# Patient Record
Sex: FEMALE | Race: BLACK OR AFRICAN AMERICAN | URBAN - METROPOLITAN AREA
[De-identification: names, ages, dates, MRNs, and addresses within clinical notes are randomized per-mention and may not be internally consistent; named-entity substitution may affect disease eponyms.]

---

## 2017-01-04 ENCOUNTER — TELEPHONE (OUTPATIENT)
Dept: FAMILY MEDICINE CLINIC | Age: 37
End: 2017-01-04

## 2017-01-05 NOTE — TELEPHONE ENCOUNTER
Pt returning my call. She says that she has been taking the abx and feeling better, about 70% better. She says that the Pulmonologist did call her. She says that she was not taking the Mucinex. I told her that if she was not feeling better to call me and we can extend her abx and to continue either Mucinex or Sudafed daily even after abx. I elt her know that her blood work was unremarkable. The patient expressed understanding and had no further questions.

## 2017-02-18 ENCOUNTER — OFFICE VISIT (OUTPATIENT)
Dept: FAMILY MEDICINE CLINIC | Age: 37
End: 2017-02-18

## 2017-02-18 VITALS
RESPIRATION RATE: 16 BRPM | TEMPERATURE: 97.5 F | OXYGEN SATURATION: 100 % | SYSTOLIC BLOOD PRESSURE: 114 MMHG | DIASTOLIC BLOOD PRESSURE: 74 MMHG | WEIGHT: 165.6 LBS | HEIGHT: 64 IN | BODY MASS INDEX: 28.27 KG/M2 | HEART RATE: 76 BPM

## 2017-02-18 DIAGNOSIS — M54.2 NECK PAIN: ICD-10-CM

## 2017-02-18 DIAGNOSIS — M54.50 ACUTE LEFT-SIDED LOW BACK PAIN WITHOUT SCIATICA: Primary | ICD-10-CM

## 2017-02-18 DIAGNOSIS — Z87.39: ICD-10-CM

## 2017-02-18 RX ORDER — IBUPROFEN 200 MG
400 TABLET ORAL
Qty: 90 TAB | Refills: 2 | Status: SHIPPED | OUTPATIENT
Start: 2017-02-18 | End: 2017-02-18 | Stop reason: DRUGHIGH

## 2017-02-18 RX ORDER — CYCLOBENZAPRINE HCL 10 MG
10 TABLET ORAL
Qty: 30 TAB | Refills: 0 | Status: SHIPPED | OUTPATIENT
Start: 2017-02-18 | End: 2017-11-24

## 2017-02-18 RX ORDER — IBUPROFEN 800 MG/1
800 TABLET ORAL
Qty: 90 TAB | Refills: 3 | Status: SHIPPED | OUTPATIENT
Start: 2017-02-18 | End: 2018-04-14
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
## 2017-02-18 NOTE — PATIENT INSTRUCTIONS
Back Pain: Care Instructions  Your Care Instructions    Back pain has many possible causes. It is often related to problems with muscles and ligaments of the back. It may also be related to problems with the nerves, discs, or bones of the back. Moving, lifting, standing, sitting, or sleeping in an awkward way can strain the back. Sometimes you don't notice the injury until later. Arthritis is another common cause of back pain. Although it may hurt a lot, back pain usually improves on its own within several weeks. Most people recover in 12 weeks or less. Using good home treatment and being careful not to stress your back can help you feel better sooner. Follow-up care is a key part of your treatment and safety. Be sure to make and go to all appointments, and call your doctor if you are having problems. Its also a good idea to know your test results and keep a list of the medicines you take. How can you care for yourself at home? · Sit or lie in positions that are most comfortable and reduce your pain. Try one of these positions when you lie down:  ¨ Lie on your back with your knees bent and supported by large pillows. ¨ Lie on the floor with your legs on the seat of a sofa or chair. Courtney Ferrell on your side with your knees and hips bent and a pillow between your legs. ¨ Lie on your stomach if it does not make pain worse. · Do not sit up in bed, and avoid soft couches and twisted positions. Bed rest can help relieve pain at first, but it delays healing. Avoid bed rest after the first day of back pain. · Change positions every 30 minutes. If you must sit for long periods of time, take breaks from sitting. Get up and walk around, or lie in a comfortable position. · Try using a heating pad on a low or medium setting for 15 to 20 minutes every 2 or 3 hours. Try a warm shower in place of one session with the heating pad. · You can also try an ice pack for 10 to 15 minutes every 2 to 3 hours.  Put a thin cloth between the ice pack and your skin. · Take pain medicines exactly as directed. ¨ If the doctor gave you a prescription medicine for pain, take it as prescribed. ¨ If you are not taking a prescription pain medicine, ask your doctor if you can take an over-the-counter medicine. · Take short walks several times a day. You can start with 5 to 10 minutes, 3 or 4 times a day, and work up to longer walks. Walk on level surfaces and avoid hills and stairs until your back is better. · Return to work and other activities as soon as you can. Continued rest without activity is usually not good for your back. · To prevent future back pain, do exercises to stretch and strengthen your back and stomach. Learn how to use good posture, safe lifting techniques, and proper body mechanics. When should you call for help? Call your doctor now or seek immediate medical care if:  · You have new or worsening numbness in your legs. · You have new or worsening weakness in your legs. (This could make it hard to stand up.)  · You lose control of your bladder or bowels. Watch closely for changes in your health, and be sure to contact your doctor if:  · Your pain gets worse. · You are not getting better after 2 weeks. Where can you learn more? Go to http://salome-teresa.info/. Enter T157 in the search box to learn more about \"Back Pain: Care Instructions. \"  Current as of: May 23, 2016  Content Version: 11.1  © 0723-8881 [employer]. Care instructions adapted under license by [employer] (which disclaims liability or warranty for this information). If you have questions about a medical condition or this instruction, always ask your healthcare professional. [address] any warranty or liability for your use of this information. Back Stretches: Exercises  Your Care Instructions  Here are some examples of exercises for stretching your back. Start each exercise slowly. Ease off the exercise if you start to have pain. Your doctor or physical therapist will tell you when you can start these exercises and which ones will work best for you. How to do the exercises  Overhead stretch    1. Stand comfortably with your feet shoulder-width apart. 2. Looking straight ahead, raise both arms over your head and reach toward the ceiling. Do not allow your head to tilt back. 3. Hold for 15 to 30 seconds, then lower your arms to your sides. 4. Repeat 2 to 4 times. Side stretch    1. Stand comfortably with your feet shoulder-width apart. 2. Raise one arm over your head, and then lean to the other side. 3. Slide your hand down your leg as you let the weight of your arm gently stretch your side muscles. Hold for 15 to 30 seconds. 4. Repeat 2 to 4 times on each side. Press-up    1. Lie on your stomach, supporting your body with your forearms. 2. Press your elbows down into the floor to raise your upper back. As you do this, relax your stomach muscles and allow your back to arch without using your back muscles. As your press up, do not let your hips or pelvis come off the floor. 3. Hold for 15 to 30 seconds, then relax. 4. Repeat 2 to 4 times. Relax and rest    1. Lie on your back with a rolled towel under your neck and a pillow under your knees. Extend your arms comfortably to your sides. 2. Relax and breathe normally. 3. Remain in this position for about 10 minutes. 4. If you can, do this 2 or 3 times each day. Follow-up care is a key part of your treatment and safety. Be sure to make and go to all appointments, and call your doctor if you are having problems. It's also a good idea to know your test results and keep a list of the medicines you take. Where can you learn more? Go to http://salome-teresa.info/. Enter K782 in the search box to learn more about \"Back Stretches: Exercises. \"  Current as of: May 23, 2016  Content Version: 11.1  © 3754-1524 Healthwise, Incorporated. Care instructions adapted under license by [employer] (which disclaims liability or warranty for this information). If you have questions about a medical condition or this instruction, always ask your healthcare professional. [address] any warranty or liability for your use of this information. Neck: Exercises  Your Care Instructions  Here are some examples of typical rehabilitation exercises for your condition. Start each exercise slowly. Ease off the exercise if you start to have pain. Your doctor or physical therapist will tell you when you can start these exercises and which ones will work best for you. How to do the exercises  Note: Stretching should make you feel a gentle stretch, but no pain. Stop any strengthening exercise that makes pain worse. Neck stretch    5. This stretch works best if you keep your shoulder down as you lean away from it. To help you remember to do this, start by relaxing your shoulders and lightly holding on to your thighs or your chair. 6. Tilt your head toward your shoulder and hold for 15 to 30 seconds. Let the weight of your head stretch your muscles. 7. If you would like a little added stretch, use your hand to gently and steadily pull your head toward your shoulder. For example, keeping your right shoulder down, lean your head to the left. 8. Repeat 2 to 4 times toward each shoulder. Diagonal neck stretch    5. Turn your head slightly toward the direction you will be stretching, and tilt your head diagonally toward your chest and hold for 15 to 30 seconds. 6. If you would like a little added stretch, use your hand to gently and steadily pull your head forward on the diagonal.  7. Repeat 2 to 4 times toward each side. Dorsal glide stretch    5. Sit or stand tall and look straight ahead. 6. Slowly tuck your chin as you glide your head backward over your body  7.  Hold for a count of 6, and then relax for up to 10 seconds. 8. Repeat 8 to 12 times. Note: The dorsal glide stretches the back of the neck. If you feel pain, do not glide so far back. Some people find this exercise easier to do while lying on their backs with an ice pack on the neck. Chest and shoulder stretch    5. Sit or stand tall and glide your head backward as in the dorsal glide stretch. 6. Raise both arms so that your hands are next to your ears. 7. Take a deep breath, and as you breathe out, lower your elbows down and behind your back. You will feel your shoulder blades slide down and together, and at the same time you will feel a stretch across your chest and the front of your shoulders. 8. Hold for about 6 seconds, and then relax for up to 10 seconds. 9. Repeat 8 to 12 times. Strengthening: Hands on head    1. Move your head backward, forward, and side to side against gentle pressure from your hands, holding each position for about 6 seconds. 2. Repeat 8 to 12 times. Follow-up care is a key part of your treatment and safety. Be sure to make and go to all appointments, and call your doctor if you are having problems. It's also a good idea to know your test results and keep a list of the medicines you take. Where can you learn more? Go to http://salome-teresa.info/. Enter P975 in the search box to learn more about \"Neck: Exercises. \"  Current as of: May 23, 2016  Content Version: 11.1  © 0646-6538 [employer]. Care instructions adapted under license by [employer] (which disclaims liability or warranty for this information). If you have questions about a medical condition or this instruction, always ask your healthcare professional. [place] [address] any warranty or liability for your use of this information.

## 2017-02-18 NOTE — PROGRESS NOTES
Yo Garza is a 43 y.o. female presents to office for follow up back pain. Breast size concern. 1. Have you been to the ER, urgent care clinic or hospitalized since your last visit? no  2.  Have you seen any other providers outside of [facility] since your last visit? no    Health Maintenance items with a due date reviewed with patient:  Health Maintenance Due   Topic Date Due    Pneumococcal 19-64 Highest Risk (1 of 3 - PCV13) 11/06/1997

## 2017-02-18 NOTE — MR AVS SNAPSHOT
Visit Information Date & Time Provider Department Dept. Phone Encounter #  
 2/18/2017 11:15 AM Shilpa Shaffer, [address] [facility] [phone] [id number] Follow-up Instructions Return if symptoms worsen or fail to improve. Upcoming Health Maintenance Date Due Pneumococcal 19-64 Highest Risk (1 of 3 - PCV13) 11/6/1997 PAP AKA CERVICAL CYTOLOGY 1/22/2021 DTaP/Tdap/Td series (2 - Td) 8/23/2026 Allergies as of 2/18/2017  Review Complete On: 2/18/2017 By: MATIAS Leyva Severity Noted Reaction Type Reactions Codeine  02/05/2013    Hives Iodinated Contrast Media - Oral And Iv Dye  01/22/2016    Itching, Swelling Pecan Nut  03/15/2016    Other (comments) Throat and ears itch Prednisone  03/15/2016    Other (comments) Shellfish Derived  01/22/2016    Swelling Current Immunizations  Reviewed on 1/22/2016 Name Date Influenza Vaccine 10/1/2015, 2/5/2013 Influenza Vaccine (Quad) PF 8/23/2016 12:24 PM  
 Tdap 8/23/2016 12:25 PM  
  
 Not reviewed this visit You Were Diagnosed With   
  
 Codes Comments Acute left-sided low back pain without sciatica    -  Primary ICD-10-CM: M54.5 ICD-9-CM: 724.2 Neck pain     ICD-10-CM: M54.2 ICD-9-CM: 723.1 History of neck disorder     ICD-10-CM: Z87.39 
ICD-9-CM: V13.59 Vitals BP Pulse Temp Resp Height(growth percentile) Weight(growth percentile) 114/74 (BP 1 Location: Right arm, BP Patient Position: Sitting) 76 97.5 °F (36.4 °C) (Oral) 16 5' 4\" (1.626 m) 165 lb 9.6 oz (75.1 kg) LMP SpO2 BMI OB Status Smoking Status 02/06/2017 (Exact Date) 100% 28.43 kg/m2 Having regular periods Never Smoker BMI and BSA Data Body Mass Index Body Surface Area  
 28.43 kg/m 2 1.84 m 2 Preferred Pharmacy Pharmacy Name Phone [facility] [address] [place] [address] [phone] Your Updated Medication List  
  
   
This list is accurate as of: 2/18/17 12:05 PM.  Always use your most recent med list.  
  
  
  
  
 albuterol 90 mcg/actuation inhaler Commonly known as:  PROVENTIL HFA, VENTOLIN HFA, PROAIR HFA Take 2 Puffs by inhalation every four (4) hours as needed for Wheezing. albuterol-ipratropium 2.5 mg-0.5 mg/3 ml Nebu Commonly known as:  DUO-NEB  
3 mL by Nebulization route every four (4) hours as needed. benzonatate 200 mg capsule Commonly known as:  TESSALON Take one capsule 3 times daily if needed for cough Cholecalciferol (Vitamin D3) 50,000 unit Cap Take  by mouth. cyclobenzaprine 10 mg tablet Commonly known as:  FLEXERIL Take 1 Tab by mouth three (3) times daily as needed for Muscle Spasm(s). ibuprofen 200 mg tablet Commonly known as:  MOTRIN Take 2 Tabs by mouth every six (6) hours as needed for Pain. lactobacillus (B.ani-L.aci-L.sal-L.plan-L. Esteban) 10 billion cell (2 billion ea) Cap capsule Take 1 Cap by mouth daily. loratadine 10 mg tablet Commonly known as:  Garlan Baas Take 1 Tab by mouth two (2) times a day. metroNIDAZOLE 0.75 % vaginal gel Commonly known as:  METROGEL VAGINAL Insert 1 Applicator into vagina nightly. Insert applicator into vagina nightly x 5 days, then twice weekly x 6 months  
  
 pyridoxine (vitamin B6) 100 mg tablet Commonly known as:  VITAMIN B-6 Take 200 mg by mouth daily. topiramate 100 mg tablet Commonly known as:  TOPAMAX Take 1 Tab by mouth two (2) times a day. Prescriptions Sent to Pharmacy Refills  
 cyclobenzaprine (FLEXERIL) 10 mg tablet 0 Sig: Take 1 Tab by mouth three (3) times daily as needed for Muscle Spasm(s). Class: Normal  
 Pharmacy: [address] [place] [address] KWABENA Pickens Ph #: [phone]  Route: Oral  
 ibuprofen (MOTRIN) 200 mg tablet 2  
 Sig: Take 2 Tabs by mouth every six (6) hours as needed for Pain. Class: Normal  
 Pharmacy: [id number] [address] KWABENA Young Ph #: [phone] Route: Oral  
  
Follow-up Instructions Return if symptoms worsen or fail to improve. To-Do List   
 02/18/2017 Imaging:  XR SPINE CERV PA LAT ODONT 3 V MAX   
  
 02/18/2017 Imaging:  XR SPINE LUMB 2 OR 3 V Patient Instructions Back Pain: Care Instructions Your Care Instructions Back pain has many possible causes. It is often related to problems with muscles and ligaments of the back. It may also be related to problems with the nerves, discs, or bones of the back. Moving, lifting, standing, sitting, or sleeping in an awkward way can strain the back. Sometimes you don't notice the injury until later. Arthritis is another common cause of back pain. Although it may hurt a lot, back pain usually improves on its own within several weeks. Most people recover in 12 weeks or less. Using good home treatment and being careful not to stress your back can help you feel better sooner. Follow-up care is a key part of your treatment and safety. Be sure to make and go to all appointments, and call your doctor if you are having problems. Its also a good idea to know your test results and keep a list of the medicines you take. How can you care for yourself at home? · Sit or lie in positions that are most comfortable and reduce your pain. Try one of these positions when you lie down: ¨ Lie on your back with your knees bent and supported by large pillows. ¨ Lie on the floor with your legs on the seat of a sofa or chair. Courtney Ferrell on your side with your knees and hips bent and a pillow between your legs. ¨ Lie on your stomach if it does not make pain worse. · Do not sit up in bed, and avoid soft couches and twisted positions.  Bed rest can help relieve pain at first, but it delays healing. Avoid bed rest after the first day of back pain. · Change positions every 30 minutes. If you must sit for long periods of time, take breaks from sitting. Get up and walk around, or lie in a comfortable position. · Try using a heating pad on a low or medium setting for 15 to 20 minutes every 2 or 3 hours. Try a warm shower in place of one session with the heating pad. · You can also try an ice pack for 10 to 15 minutes every 2 to 3 hours. Put a thin cloth between the ice pack and your skin. · Take pain medicines exactly as directed. ¨ If the doctor gave you a prescription medicine for pain, take it as prescribed. ¨ If you are not taking a prescription pain medicine, ask your doctor if you can take an over-the-counter medicine. · Take short walks several times a day. You can start with 5 to 10 minutes, 3 or 4 times a day, and work up to longer walks. Walk on level surfaces and avoid hills and stairs until your back is better. · Return to work and other activities as soon as you can. Continued rest without activity is usually not good for your back. · To prevent future back pain, do exercises to stretch and strengthen your back and stomach. Learn how to use good posture, safe lifting techniques, and proper body mechanics. When should you call for help? Call your doctor now or seek immediate medical care if: 
· You have new or worsening numbness in your legs. · You have new or worsening weakness in your legs. (This could make it hard to stand up.) · You lose control of your bladder or bowels. Watch closely for changes in your health, and be sure to contact your doctor if: 
· Your pain gets worse. · You are not getting better after 2 weeks. Where can you learn more? Go to http://salome-teresa.info/. Enter B145 in the search box to learn more about \"Back Pain: Care Instructions. \" Current as of: May 23, 2016 Content Version: 11.1 © 2357-7757 BodeTree. Care instructions adapted under license by ANPI (which disclaims liability or warranty for this information). If you have questions about a medical condition or this instruction, always ask your healthcare professional. [address] any warranty or liability for your use of this information. Back Stretches: Exercises Your Care Instructions Here are some examples of exercises for stretching your back. Start each exercise slowly. Ease off the exercise if you start to have pain. Your doctor or physical therapist will tell you when you can start these exercises and which ones will work best for you. How to do the exercises Overhead stretch 1. Stand comfortably with your feet shoulder-width apart. 2. Looking straight ahead, raise both arms over your head and reach toward the ceiling. Do not allow your head to tilt back. 3. Hold for 15 to 30 seconds, then lower your arms to your sides. 4. Repeat 2 to 4 times. Side stretch 1. Stand comfortably with your feet shoulder-width apart. 2. Raise one arm over your head, and then lean to the other side. 3. Slide your hand down your leg as you let the weight of your arm gently stretch your side muscles. Hold for 15 to 30 seconds. 4. Repeat 2 to 4 times on each side. Press-up 1. Lie on your stomach, supporting your body with your forearms. 2. Press your elbows down into the floor to raise your upper back. As you do this, relax your stomach muscles and allow your back to arch without using your back muscles. As your press up, do not let your hips or pelvis come off the floor. 3. Hold for 15 to 30 seconds, then relax. 4. Repeat 2 to 4 times. Relax and rest 
 
1. Lie on your back with a rolled towel under your neck and a pillow under your knees. Extend your arms comfortably to your sides. 2. Relax and breathe normally. 3. Remain in this position for about 10 minutes. 4. If you can, do this 2 or 3 times each day. Follow-up care is a key part of your treatment and safety. Be sure to make and go to all appointments, and call your doctor if you are having problems. It's also a good idea to know your test results and keep a list of the medicines you take. Where can you learn more? Go to http://salome-teresa.info/. Enter I835 in the search box to learn more about \"Back Stretches: Exercises. \" Current as of: May 23, 2016 Content Version: 11.1 © 0363-4441 DreamHeart. Care instructions adapted under license by enymotion (which disclaims liability or warranty for this information). If you have questions about a medical condition or this instruction, always ask your healthcare professional. [address] any warranty or liability for your use of this information. Neck: Exercises Your Care Instructions Here are some examples of typical rehabilitation exercises for your condition. Start each exercise slowly. Ease off the exercise if you start to have pain. Your doctor or physical therapist will tell you when you can start these exercises and which ones will work best for you. How to do the exercises Note: Stretching should make you feel a gentle stretch, but no pain. Stop any strengthening exercise that makes pain worse. Neck stretch 5. This stretch works best if you keep your shoulder down as you lean away from it. To help you remember to do this, start by relaxing your shoulders and lightly holding on to your thighs or your chair. 6. Tilt your head toward your shoulder and hold for 15 to 30 seconds. Let the weight of your head stretch your muscles. 7. If you would like a little added stretch, use your hand to gently and steadily pull your head toward your shoulder. For example, keeping your right shoulder down, lean your head to the left. 8. Repeat 2 to 4 times toward each shoulder. Diagonal neck stretch 5. Turn your head slightly toward the direction you will be stretching, and tilt your head diagonally toward your chest and hold for 15 to 30 seconds. 6. If you would like a little added stretch, use your hand to gently and steadily pull your head forward on the diagonal. 
7. Repeat 2 to 4 times toward each side. Dorsal glide stretch 5. Sit or stand tall and look straight ahead. 6. Slowly tuck your chin as you glide your head backward over your body 7. Hold for a count of 6, and then relax for up to 10 seconds. 8. Repeat 8 to 12 times. Note: The dorsal glide stretches the back of the neck. If you feel pain, do not glide so far back. Some people find this exercise easier to do while lying on their backs with an ice pack on the neck. Chest and shoulder stretch 5. Sit or stand tall and glide your head backward as in the dorsal glide stretch. 6. Raise both arms so that your hands are next to your ears. 7. Take a deep breath, and as you breathe out, lower your elbows down and behind your back. You will feel your shoulder blades slide down and together, and at the same time you will feel a stretch across your chest and the front of your shoulders. 8. Hold for about 6 seconds, and then relax for up to 10 seconds. 9. Repeat 8 to 12 times. Strengthening: Hands on head 1. Move your head backward, forward, and side to side against gentle pressure from your hands, holding each position for about 6 seconds. 2. Repeat 8 to 12 times. Follow-up care is a key part of your treatment and safety. Be sure to make and go to all appointments, and call your doctor if you are having problems. It's also a good idea to know your test results and keep a list of the medicines you take. Where can you learn more? Go to http://salome-teresa.info/. Enter P975 in the search box to learn more about \"Neck: Exercises. \" 
 Current as of: May 23, 2016 Content Version: 11.1 © 8173-7554 Diablo Technologies, Atlas Powered. Care instructions adapted under license by DVS Intelestream (which disclaims liability or warranty for this information). If you have questions about a medical condition or this instruction, always ask your healthcare professional. [address] any warranty or liability for your use of this information. Introducing [facility] & HEALTH SERVICES! Dear Kiarra Sheehan: 
Thank you for requesting a [facility] account. Our records indicate that you already have an active [facility] account. You can access your account anytime at [email or web address] Did you know that you can access your hospital and ER discharge instructions at any time in [facility]? You can also review all of your test results from your hospital stay or ER visit. Additional Information If you have questions, please visit the Frequently Asked Questions section of the [facility] website at [email or web address] Remember, [facility] is NOT to be used for urgent needs. For medical emergencies, dial 911. Now available from your iPhone and Android! Please provide this summary of care documentation to your next provider. Your primary care clinician is listed as Clau Dean. If you have any questions after today's visit, please call [phone].

## 2017-03-10 DIAGNOSIS — M54.5 ACUTE LOW BACK PAIN, UNSPECIFIED BACK PAIN LATERALITY, WITH SCIATICA PRESENCE UNSPECIFIED: ICD-10-CM

## 2017-03-10 DIAGNOSIS — M54.2 CERVICALGIA: Primary | ICD-10-CM

## 2017-03-15 ENCOUNTER — TELEPHONE (OUTPATIENT)
Dept: FAMILY MEDICINE CLINIC | Age: 37
End: 2017-03-15

## 2017-03-15 NOTE — TELEPHONE ENCOUNTER
Patient called the office back and was informed of results. Patient was advised to follow up sooner  if the pain does not improve. Patient showed verbal understanding and had  No further questions or concern at this time.

## 2017-03-20 ENCOUNTER — OFFICE VISIT (OUTPATIENT)
Dept: FAMILY MEDICINE CLINIC | Age: 37
End: 2017-03-20

## 2017-03-20 VITALS
TEMPERATURE: 99.2 F | WEIGHT: 171.8 LBS | SYSTOLIC BLOOD PRESSURE: 115 MMHG | RESPIRATION RATE: 16 BRPM | HEART RATE: 106 BPM | OXYGEN SATURATION: 99 % | BODY MASS INDEX: 29.33 KG/M2 | HEIGHT: 64 IN | DIASTOLIC BLOOD PRESSURE: 77 MMHG

## 2017-03-20 DIAGNOSIS — J02.9 SORE THROAT: Primary | ICD-10-CM

## 2017-03-20 DIAGNOSIS — J02.0 STREP PHARYNGITIS: ICD-10-CM

## 2017-03-20 LAB
S PYO AG THROAT QL: POSITIVE
VALID INTERNAL CONTROL?: YES

## 2017-03-20 RX ORDER — FLUCONAZOLE 150 MG/1
150 TABLET ORAL DAILY
Qty: 1 TAB | Refills: 0 | Status: SHIPPED | OUTPATIENT
Start: 2017-03-20 | End: 2017-03-21

## 2017-03-20 RX ORDER — AMOXICILLIN 500 MG/1
500 CAPSULE ORAL 2 TIMES DAILY
Qty: 20 CAP | Refills: 0 | Status: SHIPPED | OUTPATIENT
Start: 2017-03-20 | End: 2017-03-30

## 2017-03-20 NOTE — MR AVS SNAPSHOT
Visit Information Date & Time Provider Department Dept. Phone Encounter #  
 3/20/2017  3:00 PM Rafael Pearl NP [employer] [phone] [id number] Follow-up Instructions Return if symptoms worsen or fail to improve. Upcoming Health Maintenance Date Due Pneumococcal 19-64 Highest Risk (1 of 3 - PCV13) 11/6/1997 PAP AKA CERVICAL CYTOLOGY 1/22/2021 DTaP/Tdap/Td series (2 - Td) 8/23/2026 Allergies as of 3/20/2017  Review Complete On: 3/20/2017 By: Rafael Pearl NP Severity Noted Reaction Type Reactions Codeine  02/05/2013    Hives Iodinated Contrast Media - Oral And Iv Dye  01/22/2016    Itching, Swelling Pecan Nut  03/15/2016    Other (comments) Throat and ears itch Prednisone  03/15/2016    Other (comments) Shellfish Derived  01/22/2016    Swelling Current Immunizations  Reviewed on 1/22/2016 Name Date Influenza Vaccine 10/1/2015, 2/5/2013 Influenza Vaccine (Quad) PF 8/23/2016 12:24 PM  
 Tdap 8/23/2016 12:25 PM  
  
 Not reviewed this visit You Were Diagnosed With   
  
 Codes Comments Sore throat    -  Primary ICD-10-CM: J02.9 ICD-9-CM: 833 Strep pharyngitis     ICD-10-CM: J02.0 ICD-9-CM: 034.0 Vitals BP Pulse Temp Resp Height(growth percentile) Weight(growth percentile) 115/77 (BP 1 Location: Right arm, BP Patient Position: Sitting) (!) 106 99.2 °F (37.3 °C) (Oral) 16 5' 4\" (1.626 m) 171 lb 12.8 oz (77.9 kg) LMP SpO2 BMI OB Status Smoking Status 03/04/2017 99% 29.49 kg/m2 Having regular periods Never Smoker Vitals History BMI and BSA Data Body Mass Index Body Surface Area  
 29.49 kg/m 2 1.88 m 2 Preferred Pharmacy Pharmacy Name Phone OSW[facility] ESPINOSA JR. [facility] [address] [place] [address] [phone] Your Updated Medication List  
  
   
 This list is accurate as of: 3/20/17  3:42 PM.  Always use your most recent med list.  
  
  
  
  
 albuterol 90 mcg/actuation inhaler Commonly known as:  PROVENTIL HFA, VENTOLIN HFA, PROAIR HFA Take 2 Puffs by inhalation every four (4) hours as needed for Wheezing. albuterol-ipratropium 2.5 mg-0.5 mg/3 ml Nebu Commonly known as:  DUO-NEB  
3 mL by Nebulization route every four (4) hours as needed. amoxicillin 500 mg capsule Commonly known as:  AMOXIL Take 1 Cap by mouth two (2) times a day for 10 days. Cholecalciferol (Vitamin D3) 50,000 unit Cap Take  by mouth. cyclobenzaprine 10 mg tablet Commonly known as:  FLEXERIL Take 1 Tab by mouth three (3) times daily as needed for Muscle Spasm(s). fluconazole 150 mg tablet Commonly known as:  DIFLUCAN Take 1 Tab by mouth daily for 1 day. FDA advises cautious prescribing of oral fluconazole in pregnancy. ibuprofen 800 mg tablet Commonly known as:  MOTRIN Take 1 Tab by mouth every eight (8) hours as needed for Pain. lactobacillus (B.ani-L.aci-L.sal-L.plan-L. Esteban) 10 billion cell (2 billion ea) Cap capsule Take 1 Cap by mouth daily. metroNIDAZOLE 0.75 % vaginal gel Commonly known as:  METROGEL VAGINAL Insert 1 Applicator into vagina nightly. Insert applicator into vagina nightly x 5 days, then twice weekly x 6 months  
  
 topiramate 100 mg tablet Commonly known as:  TOPAMAX Take 1 Tab by mouth two (2) times a day. Prescriptions Sent to Pharmacy Refills  
 amoxicillin (AMOXIL) 500 mg capsule 0 Sig: Take 1 Cap by mouth two (2) times a day for 10 days. Class: Normal  
 Pharmacy: [address] [place] [address] KWABENA Rendon Ph #: [phone] Route: Oral  
 fluconazole (DIFLUCAN) 150 mg tablet 0 Sig: Take 1 Tab by mouth daily for 1 day. FDA advises cautious prescribing of oral fluconazole in pregnancy.   
 Class: Normal  
 Pharmacy: [facility] [address] [place] [address] KWABENA Vogel Ph #: [phone] Route: Oral  
  
We Performed the Following AMB POC RAPID STREP A [99454 CPT(R)] Follow-up Instructions Return if symptoms worsen or fail to improve. Patient Instructions Strep Throat: Care Instructions Your Care Instructions Strep throat is a bacterial infection that causes sudden, severe sore throat and fever. Strep throat, which is caused by bacteria called streptococcus, is treated with antibiotics. Sometimes a strep test is necessary to tell if the sore throat is caused by strep bacteria. Treatment can help ease symptoms and may prevent future problems. Follow-up care is a key part of your treatment and safety. Be sure to make and go to all appointments, and call your doctor if you are having problems. It's also a good idea to know your test results and keep a list of the medicines you take. How can you care for yourself at home? · Take your antibiotics as directed. Do not stop taking them just because you feel better. You need to take the full course of antibiotics. · Strep throat can spread to others until 24 hours after you begin taking antibiotics. During this time, you should avoid contact with other people at work or home, especially infants and children. Do not sneeze or cough on others, and wash your hands often. Keep your drinking glass and eating utensils separate from those of others, and wash these items well in hot, soapy water. · Gargle with warm salt water at least once each hour to help reduce swelling and make your throat feel better. Use 1 teaspoon of salt mixed in 8 fluid ounces of warm water. · Take an over-the-counter pain medication, such as acetaminophen (Tylenol), ibuprofen (Advil, Motrin), or naproxen (Aleve). Read and follow all instructions on the label.  
· Try an over-the-counter anesthetic throat spray or throat lozenges, which may help relieve throat pain. · Drink plenty of fluids. Fluids may help soothe an irritated throat. Hot fluids, such as tea or soup, may help your throat feel better. · Eat soft solids and drink plenty of clear liquids. Flavored ice pops, ice cream, scrambled eggs, sherbet, and gelatin dessert (such as Jell-O) may also soothe the throat. · Get lots of rest. 
· Do not smoke, and avoid secondhand smoke. If you need help quitting, talk to your doctor about stop-smoking programs and medicines. These can increase your chances of quitting for good. · Use a vaporizer or humidifier to add moisture to the air in your bedroom. Follow the directions for cleaning the machine. When should you call for help? Call your doctor now or seek immediate medical care if: 
· You have a new or higher fever. · You have a fever with a stiff neck or severe headache. · You have new or worse trouble swallowing. · Your sore throat gets much worse on one side. · Your pain becomes much worse on one side of your throat. Watch closely for changes in your health, and be sure to contact your doctor if: 
· You are not getting better after 2 days (48 hours). · You do not get better as expected. Where can you learn more? Go to http://salome-teresa.info/. Enter K625 in the search box to learn more about \"Strep Throat: Care Instructions. \" Current as of: July 29, 2016 Content Version: 11.1 © 0592-8063 Levo League, Incorporated. Care instructions adapted under license by Zi Uniform Supply (which disclaims liability or warranty for this information). If you have questions about a medical condition or this instruction, always ask your healthcare professional. [address] any warranty or liability for your use of this information. Introducing [facility] & HEALTH SERVICES! Dear Arthur Morrison: 
Thank you for requesting a [employer] account.   Our records indicate that you already have an active [facility] account. You can access your account anytime at [email or web address] Did you know that you can access your hospital and ER discharge instructions at any time in [employer]? You can also review all of your test results from your hospital stay or ER visit. Additional Information If you have questions, please visit the Frequently Asked Questions section of the [employer] website at [email or web address] Remember, [employer] is NOT to be used for urgent needs. For medical emergencies, dial 911. Now available from your iPhone and Android! Please provide this summary of care documentation to your next provider. Your primary care clinician is listed as Kelin Rodriguez. If you have any questions after today's visit, please call [phone].

## 2017-03-20 NOTE — LETTER
NOTIFICATION RETURN TO WORK 
 
3/20/2017 3:42 PM 
 
Ms. Whitney Deleon [address] [facility] [address] [place] [phone] To Whom It May Concern: 
 
Whitney Deleon is currently under the care of [address] [place] [address]. She will return to work on: Wednesday March 22,2017 If there are questions or concerns please have the patient contact our office.  
 
 
 
Sincerely, 
 
 
Rica Jimenez NP

## 2017-03-20 NOTE — PROGRESS NOTES
Marianela Gale is a 43 y.o. female presents in office with sore throat that began Saturday night. Daughter has strep as well. 1. Have you been to the ER, urgent care clinic since your last visit? Hospitalized since your last visit? No    2. Have you seen or consulted any other health care providers outside of the [facility] since your last visit? Include any pap smears or colon screening.  No

## 2017-03-20 NOTE — PROGRESS NOTES
Eric Enriquez is a 43 y.o.  female and presents with    Chief Complaint   Patient presents with    Sore Throat     started Saturday night       Subjective:  Sore Throat  Patient complains of sore throat. Associated symptoms include sore throat. Onset of symptoms was 2 days ago, gradually worsening since that time. She is drinking moderate amounts of fluids. . She has had recent close exposure to someone with proven streptococcal pharyngitis. Her 5year old daughter was diagnosed with strep and is currently on amoxicillin (4th day). She has been taking ibuprofen for headache but no other medications. She states she has had strep many times as an adult. Additional Concerns: No       Patient Active Problem List   Diagnosis Code    [facility] G40.909    Palpitations R00.2    GARCIA (dyspnea on exertion) R06.09    CKD (chronic kidney disease) stage 2, GFR 60-89 ml/min N18.2    Recurrent UTI N39.0    Vitamin D deficiency E55.9    Bacterial vaginosis N76.0, B96.89    Trichomonal vaginitis A59.01    Routine health maintenance Z00.00    Chest pain R07.9    Urinary frequency R35.0    Loose skin L98.7    Allergic reaction T78.40XA    Abnormal uterine bleeding (AUB) N93.9     Current Outpatient Prescriptions   Medication Sig Dispense Refill    cyclobenzaprine (FLEXERIL) 10 mg tablet Take 1 Tab by mouth three (3) times daily as needed for Muscle Spasm(s). 30 Tab 0    ibuprofen (MOTRIN) 800 mg tablet Take 1 Tab by mouth every eight (8) hours as needed for Pain. 90 Tab 3    albuterol-ipratropium (DUO-NEB) 2.5 mg-0.5 mg/3 ml nebu 3 mL by Nebulization route every four (4) hours as needed. 30 Nebule 5    lactobacillus, B.ani-L.aci-L.sal-L.plan-L. Esteban, 10 billion cell (2 billion ea) cap capsule Take 1 Cap by mouth daily. 30 Cap 3    albuterol (PROVENTIL HFA, VENTOLIN HFA, PROAIR HFA) 90 mcg/actuation inhaler Take 2 Puffs by inhalation every four (4) hours as needed for Wheezing.  1 Inhaler 2    topiramate (TOPAMAX) 100 mg tablet Take 1 Tab by mouth two (2) times a day. (Patient taking differently: Take 150 mg by mouth two (2) times a day.) 60 Tab 2    Cholecalciferol, Vitamin D3, 50,000 unit cap Take  by mouth.  metroNIDAZOLE (METROGEL VAGINAL) 0.75 % vaginal gel Insert 1 Applicator into vagina nightly. Insert applicator into vagina nightly x 5 days, then twice weekly x 6 months 10 Tube 3     Allergies   Allergen Reactions    Codeine Hives    Iodinated Contrast Media - Oral And Iv Dye Itching and Swelling    Pecan Nut Other (comments)     Throat and ears itch    Prednisone Other (comments)    Shellfish Derived Swelling     Past Medical History:   Diagnosis Date    BV (bacterial vaginosis)     Chronic BV    Depression     Epilepsy ([address] 2001    Dr. Mary Damian H/O exercise stress test 11/26/2013    exercise nuc stress test wnl with EF 83%    Hematuria, microscopic 2015    Pelvic floor dysfunction     Recurrent UTI     Seizures (HCC)     Urinary frequency     UTI (urinary tract infection)      Past Surgical History:   Procedure Laterality Date    HX CHOLECYSTECTOMY  2005    HX TUBAL LIGATION  2008    LAP,CHOLECYSTECTOMY       Family History   Problem Relation Age of Onset    Thyroid Disease Mother     Alcohol abuse Father     Thyroid Disease Maternal Aunt     Thyroid Disease Maternal Grandmother     Cataract Paternal Grandmother      Social History   Substance Use Topics    Smoking status: Never Smoker    Smokeless tobacco: Never Used    Alcohol use No       ROS   History obtained from the patient  General ROS: positive for  - chills  ENT ROS: positive for - headaches and sore throat  Respiratory ROS: no cough, shortness of breath, or wheezing  Cardiovascular ROS: no chest pain or dyspnea on exertion    All other systems reviewed and are negative.       Objective:  Vitals:    03/20/17 1520   BP: 115/77   Pulse: (!) 106   Resp: 16   Temp: 99.2 °F (37.3 °C) TempSrc: Oral   SpO2: 99%   Weight: 171 lb 12.8 oz (77.9 kg)   Height: 5' 4\" (1.626 m)   PainSc:   8   PainLoc: Throat   LMP: 03/04/2017       General appearance - alert, well appearing, and in no distress  Ears - bilateral TM's and external ear canals normal  Mouth - erythematous and tonsils hypertrophied without exudate  Neck - supple, no significant adenopathy  Chest - clear to auscultation, no wheezes, rales or rhonchi, symmetric air entry  Heart - tachycardic and regular rhythm      LABS    3/20/2017  3:28 PM - Merilee Puls, LPN   Component Results   Component Value Flag Ref Range Units Status   VALID INTERNAL CONTROL POC Yes    Final   Group A Strep Ag Positive  Negative  Final        Assessment/Plan:    1. Strep Pharyngitis- positive rapid strep; Amoxicillin prescribed; symptomatic care; change out toothbrush after 24 hours of being on antibiotic; handwashing; avoid using same utensils and cups as others in the house hold; rest and increase water intake; chloraseptic spray for sore throat;    Lab review: no lab studies available for review at time of visit    Today's Visit: Amoxicillin, Fluconazole    Health Maintenance:     I have discussed the diagnosis with the patient and the intended plan as seen in the above orders. The patient has received an after-visit summary and questions were answered concerning future plans. I have discussed medication side effects and warnings with the patient as well. I have reviewed the plan of care with the patient, accepted their input and they are in agreement with the treatment goals. Follow-up Disposition:  Return if symptoms worsen or fail to improve. More than 1/2 of this 15 minute visit was spent in counseling and coordination of care, as described above.     YOLIS Frank

## 2017-03-20 NOTE — PATIENT INSTRUCTIONS

## 2017-07-28 ENCOUNTER — OFFICE VISIT (OUTPATIENT)
Dept: FAMILY MEDICINE CLINIC | Age: 37
End: 2017-07-28

## 2017-07-28 VITALS
HEIGHT: 64 IN | HEART RATE: 88 BPM | SYSTOLIC BLOOD PRESSURE: 108 MMHG | OXYGEN SATURATION: 98 % | DIASTOLIC BLOOD PRESSURE: 74 MMHG | WEIGHT: 170 LBS | RESPIRATION RATE: 18 BRPM | BODY MASS INDEX: 29.02 KG/M2 | TEMPERATURE: 98.6 F

## 2017-07-28 DIAGNOSIS — B96.89 BACTERIAL VAGINOSIS: Primary | ICD-10-CM

## 2017-07-28 DIAGNOSIS — N76.0 BACTERIAL VAGINOSIS: Primary | ICD-10-CM

## 2017-07-28 DIAGNOSIS — E55.9 VITAMIN D DEFICIENCY: ICD-10-CM

## 2017-07-28 DIAGNOSIS — B37.9 YEAST INFECTION: ICD-10-CM

## 2017-07-28 RX ORDER — METRONIDAZOLE 500 MG/1
TABLET ORAL
Qty: 14 TAB | Refills: 0 | Status: SHIPPED | OUTPATIENT
Start: 2017-07-28 | End: 2017-11-15 | Stop reason: ALTCHOICE

## 2017-07-28 RX ORDER — ASPIRIN 325 MG
1 TABLET, DELAYED RELEASE (ENTERIC COATED) ORAL
Qty: 12 CAP | Refills: 1 | Status: SHIPPED | OUTPATIENT
Start: 2017-07-28 | End: 2018-01-22

## 2017-07-28 RX ORDER — FLUCONAZOLE 150 MG/1
150 TABLET ORAL DAILY
Qty: 1 TAB | Refills: 0 | Status: SHIPPED | OUTPATIENT
Start: 2017-07-28 | End: 2017-07-29

## 2017-07-28 NOTE — PROGRESS NOTES
Subjective:   Patient is a 43y.o. year old female who presents for Yeast Infection and Labs (low CO2)  1. Abnormal labs: She has been followed by Neurology. She was having some abnormal twitching going on in her cheek. So did blood work and B12 was low. Also CO2 was a little low. For the B12, she's getting injections by Neuro. I looked at the blood work, and CO2 was 18. Looked back in [place] over the last 10 years, and has always been 18-low 25s. Several other times was 18 or 19, other times 20-21. I reassured her that it's never been significantly low and I don't think this is abnormal for her. Talked to her about hyperventilating, especially when getting labs drawn. Will follow when get future lab draws but no further w/u at this time. 2.  Yeast infection:  She normally gets Metrogel for BV. But every time she uses Metrogel she gets a yeast infection. So she's continued to have a D/C since the last time she took Metrogel ( 1-2 months ago). She's having continued D/C, odor (musty). Worse when gets hot. Having some itching as well. D/C is white, somewhat thick. Review of Systems   Constitutional: Negative. Cardiovascular: Negative. Current Outpatient Prescriptions on File Prior to Visit   Medication Sig Dispense Refill    ibuprofen (MOTRIN) 800 mg tablet Take 1 Tab by mouth every eight (8) hours as needed for Pain. 90 Tab 3    albuterol-ipratropium (DUO-NEB) 2.5 mg-0.5 mg/3 ml nebu 3 mL by Nebulization route every four (4) hours as needed. 30 Nebule 5    lactobacillus, B.ani-L.aci-L.sal-L.plan-L. Esteban, 10 billion cell (2 billion ea) cap capsule Take 1 Cap by mouth daily. 30 Cap 3    albuterol (PROVENTIL HFA, VENTOLIN HFA, PROAIR HFA) 90 mcg/actuation inhaler Take 2 Puffs by inhalation every four (4) hours as needed for Wheezing. 1 Inhaler 2    topiramate (TOPAMAX) 100 mg tablet Take 1 Tab by mouth two (2) times a day.  (Patient taking differently: Take 150 mg by mouth two (2) times a day.) 60 Tab 2    cyclobenzaprine (FLEXERIL) 10 mg tablet Take 1 Tab by mouth three (3) times daily as needed for Muscle Spasm(s). 30 Tab 0     No current facility-administered medications on file prior to visit. Reviewed PmHx, RxHx, FmHx, SocHx, AllgHx and updated and dated in the chart. Nurse notes were reviewed and are correct    Objective:     Vitals:    07/28/17 1520   BP: 108/74   Pulse: 88   Resp: 18   Temp: 98.6 °F (37 °C)   TempSrc: Oral   SpO2: 98%   Weight: 170 lb (77.1 kg)   Height: 5' 4.02\" (1.626 m)     Physical Exam   Constitutional: She is oriented to person, place, and time. She appears well-developed and well-nourished. No distress. HENT:   Head: Normocephalic and atraumatic. Cardiovascular: Normal rate, regular rhythm, normal heart sounds and intact distal pulses. Exam reveals no gallop and no friction rub. No murmur heard. Pulmonary/Chest: Effort normal and breath sounds normal. No respiratory distress. Abdominal: Soft. Bowel sounds are normal. She exhibits no distension. There is no tenderness. Musculoskeletal: She exhibits no edema. Lymphadenopathy:     She has no cervical adenopathy. Neurological: She is alert and oriented to person, place, and time. Skin: Skin is warm and dry. Psychiatric: She has a normal mood and affect. Her behavior is normal.   Nursing note and vitals reviewed. Assessment/ Plan:     Diagnoses and all orders for this visit:    1. Bacterial vaginosis:  Didn't do pelvic exam.  Patient didn't want today, but since having typical symptoms and strong hx, will treat empirically with Flagyl oral.    -     metroNIDAZOLE (FLAGYL) 500 mg tablet; TAKE 1 TABLET BY MOUTH TWICE DAILY FOR 7 DAYS    2. Yeast infection:  Printed this out. Since she can get secondary yeast infections from taking metronidazole, I told her to take this if after the metronidazole is done she is having itching and D/C, then to use this.   If symptoms not relieved, she will need pelvic with Nuswab  -     fluconazole (DIFLUCAN) 150 mg tablet; Take 1 Tab by mouth daily for 1 day. FDA advises cautious prescribing of oral fluconazole in pregnancy. 3. Vitamin D deficiency:  Refill  -     Cholecalciferol, Vitamin D3, 50,000 unit cap; Take 1 Cap by mouth every seven (7) days. I have discussed the diagnosis with the patient and the intended plan as seen in the above orders. The patient verbalized understanding and agrees with the plan.     Follow-up Disposition: Not on File    Siva Ley MD

## 2017-07-28 NOTE — PROGRESS NOTES
Zay Nguyen is a 43 y.o. female presents in office to f/u because of low CO2. There are no preventive care reminders to display for this patient. 1. Have you been to the ER, urgent care clinic since your last visit? Hospitalized since your last visit?no    2. Have you seen or consulted any other health care providers outside of the [facility] since your last visit? Include any pap smears or colon screening.  No

## 2017-07-28 NOTE — PATIENT INSTRUCTIONS
Bacterial Vaginosis: Care Instructions  Your Care Instructions    Bacterial vaginosis is a type of vaginal infection. It is caused by excess growth of certain bacteria that are normally found in the vagina. Symptoms can include itching, swelling, pain when you urinate or have sex, and a gray or yellow discharge with a \"fishy\" odor. It is not considered an infection that is spread through sexual contact. Although symptoms can be annoying and uncomfortable, bacterial vaginosis does not usually cause other health problems. However, if you have it while you are pregnant, it can cause complications. While the infection may go away on its own, most doctors use antibiotics to treat it. You may have been prescribed pills or vaginal cream. With treatment, bacterial vaginosis usually clears up in 5 to 7 days. Follow-up care is a key part of your treatment and safety. Be sure to make and go to all appointments, and call your doctor if you are having problems. It's also a good idea to know your test results and keep a list of the medicines you take. How can you care for yourself at home? · Take your antibiotics as directed. Do not stop taking them just because you feel better. You need to take the full course of antibiotics. · Do not eat or drink anything that contains alcohol if you are taking metronidazole (Flagyl). · Keep using your medicine if you start your period. Use pads instead of tampons while using a vaginal cream or suppository. Tampons can absorb the medicine. · Wear loose cotton clothing. Do not wear nylon and other materials that hold body heat and moisture close to the skin. · Do not scratch. Relieve itching with a cold pack or a cool bath. · Do not wash your vaginal area more than once a day. Use plain water or a mild, unscented soap. Do not douche. When should you call for help?   Watch closely for changes in your health, and be sure to contact your doctor if:  · You have unexpected vaginal bleeding. · You have a fever. · You have new or increased pain in your vagina or pelvis. · You are not getting better after 1 week. · Your symptoms return after you finish the course of your medicine. Where can you learn more? Go to http://salome-teresa.info/. Keisha Garcia in the search box to learn more about \"Bacterial Vaginosis: Care Instructions. \"  Current as of: October 13, 2016  Content Version: 11.3  © 7852-4909 [employer]. Care instructions adapted under license by [employer] (which disclaims liability or warranty for this information). If you have questions about a medical condition or this instruction, always ask your healthcare professional. [address] any warranty or liability for your use of this information.

## 2017-11-15 ENCOUNTER — HOSPITAL ENCOUNTER (OUTPATIENT)
Dept: LAB | Age: 37
Discharge: HOME | End: 2017-11-15
Payer: COMMERCIAL

## 2017-11-15 ENCOUNTER — OFFICE VISIT (OUTPATIENT)
Dept: FAMILY MEDICINE CLINIC | Age: 37
End: 2017-11-15

## 2017-11-15 VITALS
OXYGEN SATURATION: 100 % | RESPIRATION RATE: 18 BRPM | BODY MASS INDEX: 26.63 KG/M2 | HEIGHT: 64 IN | WEIGHT: 156 LBS | HEART RATE: 71 BPM | SYSTOLIC BLOOD PRESSURE: 123 MMHG | DIASTOLIC BLOOD PRESSURE: 76 MMHG | TEMPERATURE: 98 F

## 2017-11-15 DIAGNOSIS — N89.8 VAGINAL DISCHARGE: ICD-10-CM

## 2017-11-15 DIAGNOSIS — N76.0 BACTERIAL VAGINITIS: ICD-10-CM

## 2017-11-15 DIAGNOSIS — Z20.2 STD EXPOSURE: ICD-10-CM

## 2017-11-15 DIAGNOSIS — N39.0 URINARY TRACT INFECTION WITHOUT HEMATURIA, SITE UNSPECIFIED: Primary | ICD-10-CM

## 2017-11-15 DIAGNOSIS — B96.89 BACTERIAL VAGINITIS: ICD-10-CM

## 2017-11-15 LAB
BILIRUB UR QL STRIP: NEGATIVE
GLUCOSE UR-MCNC: NEGATIVE MG/DL
KETONES P FAST UR STRIP-MCNC: NEGATIVE MG/DL
PH UR STRIP: 6.5 [PH] (ref 4.6–8)
PROT UR QL STRIP: NEGATIVE
SP GR UR STRIP: 1.02 (ref 1–1.03)
UA UROBILINOGEN AMB POC: NORMAL (ref 0.2–1)
URINALYSIS CLARITY POC: CLEAR
URINALYSIS COLOR POC: NORMAL
URINE BLOOD POC: NEGATIVE
URINE LEUKOCYTES POC: NORMAL
URINE NITRITES POC: NEGATIVE

## 2017-11-15 PROCEDURE — 87798 DETECT AGENT NOS DNA AMP: CPT | Performed by: NURSE PRACTITIONER

## 2017-11-15 RX ORDER — SULFAMETHOXAZOLE AND TRIMETHOPRIM 800; 160 MG/1; MG/1
1 TABLET ORAL 2 TIMES DAILY
Qty: 14 TAB | Refills: 0 | Status: SHIPPED | OUTPATIENT
Start: 2017-11-15 | End: 2017-11-22

## 2017-11-15 NOTE — PATIENT INSTRUCTIONS
Exposure to Sexually Transmitted Infections: Care Instructions  Your Care Instructions    Sexually transmitted infections (STIs) are those diseases spread by sexual contact. There are at least 20 different STIs, including chlamydia, gonorrhea, syphilis, and human immunodeficiency virus (HIV), which causes AIDS. Bacteria-caused STIs can be treated and cured. STIs caused by viruses, such as HIV, can be treated but not cured. Some STIs can reduce a woman's chances of getting pregnant in the future. STIs are spread during sexual contact, such as vaginal intercourse and oral or anal sex. Follow-up care is a key part of your treatment and safety. Be sure to make and go to all appointments, and call your doctor if you are having problems. It's also a good idea to know your test results and keep a list of the medicines you take. How can you care for yourself at home? · Your doctor may have given you a shot of antibiotics. If your doctor prescribed antibiotic pills, take them as directed. Do not stop taking them just because you feel better. You need to take the full course of antibiotics. · Do not have sexual contact while you have symptoms of an STI or are being treated for an STI. · Tell your sex partner (or partners) that he or she will need treatment. · If you are a woman, do not douche. Douching changes the normal balance of bacteria in the vagina and may spread an infection up into your reproductive organs. To prevent exposure to STIs in the future  · Use latex condoms every time you have sex. Use them from the beginning to the end of sexual contact. · Talk to your partner before you have sex. Find out if he or she has or is at risk for any STI. Keep in mind that a person may be able to spread an STI even if he or she does not have symptoms. · Do not have sex if you are being treated for an STI. · Do not have sex with anyone who has symptoms of an STI, such as sores on the genitals or mouth.   · Having one sex partner (who does not have STIs and does not have sex with anyone else) is a good way to avoid STIs. When should you call for help? Call your doctor now or seek immediate medical care if:  ? · You have new pain in your belly or pelvis. ? · You have symptoms of a urinary tract infection. These may include:  ¨ Pain or burning when you urinate. ¨ A frequent need to urinate without being able to pass much urine. ¨ Pain in the flank, which is just below the rib cage and above the waist on either side of the back. ¨ Blood in your urine. ¨ A fever. ? · You have new or worsening pain or swelling in the scrotum. ? Watch closely for changes in your health, and be sure to contact your doctor if:  ? · You have unusual vaginal bleeding. ? · You have a discharge from the vagina or penis. ? · You have any new symptoms, such as sores, bumps, rashes, blisters, or warts. ? · You have itching, tingling, pain, or burning in the genital or anal area. ? · You think you may have an STI. Where can you learn more? Go to http://salome-teresa.info/. Enter Y041 in the search box to learn more about \"Exposure to Sexually Transmitted Infections: Care Instructions. \"  Current as of: March 20, 2017  Content Version: 11.4  © 8192-3407 [employer]. Care instructions adapted under license by [employer] (which disclaims liability or warranty for this information). If you have questions about a medical condition or this instruction, always ask your healthcare professional. [place] [address] any warranty or liability for your use of this information.

## 2017-11-15 NOTE — MR AVS SNAPSHOT
Visit Information Date & Time Provider Department Dept. Phone Encounter #  
 11/15/2017  3:00 PM Ragini Ahn  [phone] [id number] Follow-up Instructions Return if symptoms worsen or fail to improve. Upcoming Health Maintenance Date Due Influenza Age 5 to Adult 8/1/2017 PAP AKA CERVICAL CYTOLOGY 1/22/2021 DTaP/Tdap/Td series (2 - Td) 8/23/2026 Allergies as of 11/15/2017  Review Complete On: 11/15/2017 By: Mark Collier NP Severity Noted Reaction Type Reactions Codeine  02/05/2013    Hives Iodinated Contrast- Oral And Iv Dye  01/22/2016    Itching, Swelling Iodine    Unknown (comments) Pecan Nut  03/15/2016    Other (comments) Throat and ears itch Prednisone  03/15/2016    Other (comments) Shellfish Derived  01/22/2016    Swelling Current Immunizations  Reviewed on 1/22/2016 Name Date Influenza Vaccine 10/1/2015, 2/5/2013 Influenza Vaccine (Quad) PF 8/23/2016 12:24 PM  
 Tdap 8/23/2016 12:25 PM  
  
 Not reviewed this visit You Were Diagnosed With   
  
 Codes Comments Urinary tract infection without hematuria, site unspecified    -  Primary ICD-10-CM: N39.0 ICD-9-CM: 599.0 STD exposure     ICD-10-CM: Z20.2 ICD-9-CM: V01.6 Vitals BP Pulse Temp Resp Height(growth percentile) Weight(growth percentile) 123/76 (BP 1 Location: Left arm, BP Patient Position: Sitting) 71 98 °F (36.7 °C) (Oral) 18 5' 4\" (1.626 m) 156 lb (70.8 kg) LMP SpO2 BMI OB Status Smoking Status 11/05/2017 100% 26.78 kg/m2 Unknown Never Smoker BMI and BSA Data Body Mass Index Body Surface Area  
 26.78 kg/m 2 1.79 m 2 Preferred Pharmacy Pharmacy Name Phone [facility] [address] [place] [address] [phone] Your Updated Medication List  
  
   
This list is accurate as of: 11/15/17  3:26 PM.  Always use your most recent med list.  
  
  
  
  
 albuterol 90 mcg/actuation inhaler Commonly known as:  PROVENTIL HFA, VENTOLIN HFA, PROAIR HFA Take 2 Puffs by inhalation every four (4) hours as needed for Wheezing. albuterol-ipratropium 2.5 mg-0.5 mg/3 ml Nebu Commonly known as:  DUO-NEB  
3 mL by Nebulization route every four (4) hours as needed. Cholecalciferol (Vitamin D3) 50,000 unit Cap Take 1 Cap by mouth every seven (7) days. cyclobenzaprine 10 mg tablet Commonly known as:  FLEXERIL Take 1 Tab by mouth three (3) times daily as needed for Muscle Spasm(s). ibuprofen 800 mg tablet Commonly known as:  MOTRIN Take 1 Tab by mouth every eight (8) hours as needed for Pain. lactobacillus (B.ani-L.aci-L.sal-L.plan-L. Esteban) 10 billion cell (2 billion ea) Cap capsule Take 1 Cap by mouth daily. topiramate 100 mg tablet Commonly known as:  TOPAMAX Take 1 Tab by mouth two (2) times a day. trimethoprim-sulfamethoxazole 160-800 mg per tablet Commonly known as:  BACTRIM DS, SEPTRA DS Take 1 Tab by mouth two (2) times a day for 7 days. Prescriptions Sent to Pharmacy Refills  
 trimethoprim-sulfamethoxazole (BACTRIM DS, SEPTRA DS) 160-800 mg per tablet 0 Sig: Take 1 Tab by mouth two (2) times a day for 7 days. Class: Normal  
 Pharmacy: [address] [place] [address] KWABENA Redd Cha Ph #: [phone] Route: Oral  
  
We Performed the Following AMB POC URINALYSIS DIP STICK AUTO W/O MICRO [38966 CPT(R)] Follow-up Instructions Return if symptoms worsen or fail to improve. To-Do List   
 11/15/2017 Lab:  NUSWAB VAGINITIS PLUS Patient Instructions Exposure to Sexually Transmitted Infections: Care Instructions Your Care Instructions Sexually transmitted infections (STIs) are those diseases spread by sexual contact.  There are at least 20 different STIs, including chlamydia, gonorrhea, syphilis, and human immunodeficiency virus (HIV), which causes AIDS. Bacteria-caused STIs can be treated and cured. STIs caused by viruses, such as HIV, can be treated but not cured. Some STIs can reduce a woman's chances of getting pregnant in the future. STIs are spread during sexual contact, such as vaginal intercourse and oral or anal sex. Follow-up care is a key part of your treatment and safety. Be sure to make and go to all appointments, and call your doctor if you are having problems. It's also a good idea to know your test results and keep a list of the medicines you take. How can you care for yourself at home? · Your doctor may have given you a shot of antibiotics. If your doctor prescribed antibiotic pills, take them as directed. Do not stop taking them just because you feel better. You need to take the full course of antibiotics. · Do not have sexual contact while you have symptoms of an STI or are being treated for an STI. · Tell your sex partner (or partners) that he or she will need treatment. · If you are a woman, do not douche. Douching changes the normal balance of bacteria in the vagina and may spread an infection up into your reproductive organs. To prevent exposure to STIs in the future · Use latex condoms every time you have sex. Use them from the beginning to the end of sexual contact. · Talk to your partner before you have sex. Find out if he or she has or is at risk for any STI. Keep in mind that a person may be able to spread an STI even if he or she does not have symptoms. · Do not have sex if you are being treated for an STI. · Do not have sex with anyone who has symptoms of an STI, such as sores on the genitals or mouth. · Having one sex partner (who does not have STIs and does not have sex with anyone else) is a good way to avoid STIs. When should you call for help? Call your doctor now or seek immediate medical care if: ? · You have new pain in your belly or pelvis. ? · You have symptoms of a urinary tract infection. These may include: 
¨ Pain or burning when you urinate. ¨ A frequent need to urinate without being able to pass much urine. ¨ Pain in the flank, which is just below the rib cage and above the waist on either side of the back. ¨ Blood in your urine. ¨ A fever. ? · You have new or worsening pain or swelling in the scrotum. ? Watch closely for changes in your health, and be sure to contact your doctor if: 
? · You have unusual vaginal bleeding. ? · You have a discharge from the vagina or penis. ? · You have any new symptoms, such as sores, bumps, rashes, blisters, or warts. ? · You have itching, tingling, pain, or burning in the genital or anal area. ? · You think you may have an STI. Where can you learn more? Go to http://salome-teresa.info/. Enter L882 in the search box to learn more about \"Exposure to Sexually Transmitted Infections: Care Instructions. \" Current as of: March 20, 2017 Content Version: 11.4 © 9164-0494 Fly Taxi. Care instructions adapted under license by CertificationPoint (which disclaims liability or warranty for this information). If you have questions about a medical condition or this instruction, always ask your healthcare professional. [address] any warranty or liability for your use of this information. Introducing [facility] & HEALTH SERVICES! Dear Jeremy Captain: 
Thank you for requesting a [employer] account. Our records indicate that you already have an active [employer] account. You can access your account anytime at [email or web address] Did you know that you can access your hospital and ER discharge instructions at any time in n[employer]v [employer]? You can also review all of your test results from your hospital stay or ER visit. Additional Information If you have questions, please visit the Frequently Asked Questions section of the [facility] website at [email or web address] Remember, Yummly is NOT to be used for urgent needs. For medical emergencies, dial 911. Now available from your iPhone and Android! Please provide this summary of care documentation to your next provider. Your primary care clinician is listed as Valentina Squires. If you have any questions after today's visit, please call [phone].

## 2017-11-15 NOTE — PROGRESS NOTES
Stephany Vallejo is a 42 y.o. female (: 1980) presenting to address:urinary frequency, pain    Chief Complaint   Patient presents with    Urinary Frequency     x 1 week     Urinary Pain    Exposure to STD       Vitals:    11/15/17 1507   BP: 123/76   Pulse: 71   Resp: 18   Temp: 98 °F (36.7 °C)   TempSrc: Oral   SpO2: 100%   Weight: 156 lb (70.8 kg)   Height: 5' 4\" (1.626 m)   PainSc:   5   PainLoc: Vagina   LMP: 2017       Hearing/Vision:   No exam data present    Learning Assessment:     Learning Assessment 2016   PRIMARY LEARNER Patient   HIGHEST LEVEL OF EDUCATION - PRIMARY LEARNER  4 YEARS OF COLLEGE   BARRIERS PRIMARY LEARNER NONE   CO-LEARNER CAREGIVER No   PRIMARY LANGUAGE ENGLISH   LEARNER PREFERENCE PRIMARY VIDEOS     LISTENING   ANSWERED BY self   RELATIONSHIP SELF     Depression Screening:     PHQ over the last two weeks 2017   Little interest or pleasure in doing things Not at all   Feeling down, depressed or hopeless Not at all   Total Score PHQ 2 0     Fall Risk Assessment:     Fall Risk Assessment, last 12 mths 2017   Able to walk? Yes   Fall in past 12 months? No     Abuse Screening:     Abuse Screening Questionnaire 2017   Do you ever feel afraid of your partner? N   Are you in a relationship with someone who physically or mentally threatens you? N   Is it safe for you to go home? Y     Coordination of Care Questionaire:   1. Have you been to the ER, urgent care clinic since your last visit? Hospitalized since your last visit? no    2. Have you seen or consulted any other health care providers outside of the [address] [place] [address] since your last visit? Include any pap smears or colon screening. Neuro DR GARY FUENTES [facility])    Advanced Directive:   1. Do you have an Advanced Directive? no    2. Would you like information on Advanced Directives? No    Patient already received flu vaccine.

## 2017-11-15 NOTE — PROGRESS NOTES
Subjective:   42 y.o. female complains of white, foul and milky vaginal discharge for 1 week. .  Denies abnormal vaginal bleeding or significant pelvic pain or  fever. She also has UTI symptoms frequency burning. New partner recently unprotected sex and now with symptoms. She also has hx of recurrent UTI's last uti treated > 2 months ago per patient report. Patient's last menstrual period was 11/05/2017. Objective:   She appears well, afebrile. Abdomen: benign, soft, nontender, no masses. Pelvic Exam: obtained nuswab no external concerns scant discharge on exam    Urine dipstick:  positive for leukocytes. .    Assessment/Plan:   Std exposure and UTI w/o Hematuria  GC and chlamydia DNA  probe sent to lab. Nuswab sent for all std testing  Treatment: abstain from coitus during course of treatment and will treat UTI and call patient with std test results   ROV prn if symptoms persist or worsen. Encounter Diagnoses   Name Primary?  Urinary tract infection without hematuria, site unspecified Yes    STD exposure     Vaginal discharge      Orders Placed This Encounter    NUSWAB VAGINITIS PLUS    AMB POC URINALYSIS DIP STICK AUTO W/O MICRO    trimethoprim-sulfamethoxazole (BACTRIM DS, SEPTRA DS) 160-800 mg per tablet   .

## 2017-11-19 LAB
A VAGINAE DNA VAG QL NAA+PROBE: ABNORMAL SCORE
BVAB2 DNA VAG QL NAA+PROBE: ABNORMAL SCORE
C ALBICANS DNA VAG QL NAA+PROBE: POSITIVE
C GLABRATA DNA VAG QL NAA+PROBE: NEGATIVE
C TRACH RRNA SPEC QL NAA+PROBE: NEGATIVE
MEGA1 DNA VAG QL NAA+PROBE: ABNORMAL SCORE
N GONORRHOEA RRNA SPEC QL NAA+PROBE: NEGATIVE
SPECIMEN SOURCE: ABNORMAL
T VAGINALIS RRNA SPEC QL NAA+PROBE: NEGATIVE

## 2017-11-20 ENCOUNTER — TELEPHONE (OUTPATIENT)
Dept: FAMILY MEDICINE CLINIC | Age: 37
End: 2017-11-20

## 2017-11-20 RX ORDER — METRONIDAZOLE 500 MG/1
500 TABLET ORAL 2 TIMES DAILY
Qty: 14 TAB | Refills: 0 | Status: SHIPPED | OUTPATIENT
Start: 2017-11-20 | End: 2017-11-27

## 2017-11-20 NOTE — TELEPHONE ENCOUNTER
----- Message from Valentina Squires NP sent at 11/20/2017 11:40 AM EST -----  Please call the patient regarding her abnormal result. Let her know she has BV and Yeast infection and this is likely why she is not getting relief. We will call in different medication she should stop taking Bactrim and start Flagyl and use OTC vaginal suppository for yeast for now.  If infection is not clearing or any further concern she should make appointment to be rechecked as needed  Thanks

## 2017-11-20 NOTE — LETTER
11/24/2017 Ms. Wes Oleary [address] [address] Dear Ms. Olsenbhavesh Ayde, We have been unable to reach you by phone to notify you of your test results. Please call our office at [phone] and ask to speak with the office  in order to explain these results to you and advise you of any recommendations.  
 
 
 
Sincerely, 
 
Willy Jerez LPN

## 2017-11-24 ENCOUNTER — OFFICE VISIT (OUTPATIENT)
Dept: FAMILY MEDICINE CLINIC | Age: 37
End: 2017-11-24

## 2017-11-24 VITALS
WEIGHT: 153.8 LBS | TEMPERATURE: 98.9 F | OXYGEN SATURATION: 98 % | RESPIRATION RATE: 17 BRPM | DIASTOLIC BLOOD PRESSURE: 67 MMHG | BODY MASS INDEX: 26.26 KG/M2 | HEART RATE: 77 BPM | HEIGHT: 64 IN | SYSTOLIC BLOOD PRESSURE: 113 MMHG

## 2017-11-24 DIAGNOSIS — R53.83 FATIGUE, UNSPECIFIED TYPE: Primary | ICD-10-CM

## 2017-11-24 NOTE — MR AVS SNAPSHOT
Visit Information Date & Time Provider Department Dept. Phone Encounter #  
 11/24/2017  1:00 PM NILESH Hodge [facility] [place] [phone] [id number] Upcoming Health Maintenance Date Due Influenza Age 5 to Adult 8/1/2017 PAP AKA CERVICAL CYTOLOGY 1/22/2021 DTaP/Tdap/Td series (2 - Td) 8/23/2026 Allergies as of 11/24/2017  Review Complete On: 11/24/2017 By: Jonny Cisneros LPN Severity Noted Reaction Type Reactions Codeine  02/05/2013    Hives Iodinated Contrast- Oral And Iv Dye  01/22/2016    Itching, Swelling Iodine    Unknown (comments) Pecan Nut  03/15/2016    Other (comments) Throat and ears itch Prednisone  03/15/2016    Other (comments) Shellfish Derived  01/22/2016    Swelling Current Immunizations  Reviewed on 1/22/2016 Name Date Influenza Vaccine 10/1/2015, 2/5/2013 Influenza Vaccine (Quad) PF 8/23/2016 12:24 PM  
 Tdap 8/23/2016 12:25 PM  
  
 Not reviewed this visit You Were Diagnosed With   
  
 Codes Comments Fatigue, unspecified type    -  Primary ICD-10-CM: R53.83 ICD-9-CM: 780.79 Vitals BP Pulse Temp Resp Height(growth percentile) Weight(growth percentile) 113/67 77 98.9 °F (37.2 °C) (Oral) 17 5' 4\" (1.626 m) 153 lb 12.8 oz (69.8 kg) LMP SpO2 BMI OB Status Smoking Status 11/05/2017 98% 26.4 kg/m2 Unknown Never Smoker Vitals History BMI and BSA Data Body Mass Index Body Surface Area  
 26.4 kg/m 2 1.78 m 2 Preferred Pharmacy Pharmacy Name Phone [facility] [address] [place] [address] [facility] [phone] Your Updated Medication List  
  
   
This list is accurate as of: 11/24/17  1:53 PM.  Always use your most recent med list.  
  
  
  
  
 albuterol 90 mcg/actuation inhaler Commonly known as:  PROVENTIL HFA, VENTOLIN HFA, PROAIR HFA  
 Take 2 Puffs by inhalation every four (4) hours as needed for Wheezing. albuterol-ipratropium 2.5 mg-0.5 mg/3 ml Nebu Commonly known as:  DUO-NEB  
3 mL by Nebulization route every four (4) hours as needed. Cholecalciferol (Vitamin D3) 50,000 unit Cap Take 1 Cap by mouth every seven (7) days. cyclobenzaprine 10 mg tablet Commonly known as:  FLEXERIL Take 1 Tab by mouth three (3) times daily as needed for Muscle Spasm(s). ibuprofen 800 mg tablet Commonly known as:  MOTRIN Take 1 Tab by mouth every eight (8) hours as needed for Pain. lactobacillus (B.ani-L.aci-L.sal-L.plan-L. Esteban) 10 billion cell (2 billion ea) Cap capsule Take 1 Cap by mouth daily. metroNIDAZOLE 500 mg tablet Commonly known as:  FLAGYL Take 1 Tab by mouth two (2) times a day for 7 days. topiramate 100 mg tablet Commonly known as:  TOPAMAX Take 1 Tab by mouth two (2) times a day. To-Do List   
 11/24/2017 Lab:  CBC W/O DIFF   
  
 11/24/2017 Lab:  IRON PROFILE   
  
 11/24/2017 Lab:  VITAMIN B12 & FOLATE   
  
 11/24/2017 Lab:  VITAMIN D, 1, 25 DIHYDROXY Patient Instructions Fatigue: Care Instructions Your Care Instructions Fatigue is a feeling of tiredness, exhaustion, or lack of energy. You may feel fatigue because of too much or not enough activity. It can also come from stress, lack of sleep, boredom, and poor diet. Many medical problems, such as viral infections, can cause fatigue. Emotional problems, especially depression, are often the cause of fatigue. Fatigue is most often a symptom of another problem. Treatment for fatigue depends on the cause. For example, if you have fatigue because you have a certain health problem, treating this problem also treats your fatigue. If depression or anxiety is the cause, treatment may help. Follow-up care is a key part of your treatment and safety.  Be sure to make and go to all appointments, and call your doctor if you are having problems. It's also a good idea to know your test results and keep a list of the medicines you take. How can you care for yourself at home? · Get regular exercise. But don't overdo it. Go back and forth between rest and exercise. · Get plenty of rest. 
· Eat a healthy diet. Do not skip meals, especially breakfast. 
· Reduce your use of caffeine, tobacco, and alcohol. Caffeine is most often found in coffee, tea, cola drinks, and chocolate. · Limit medicines that can cause fatigue. This includes tranquilizers and cold and allergy medicines. When should you call for help? Watch closely for changes in your health, and be sure to contact your doctor if: 
? · You have new symptoms such as fever or a rash. ? · Your fatigue gets worse. ? · You have been feeling down, depressed, or hopeless. Or you may have lost interest in things that you usually enjoy. ? · You are not getting better as expected. Where can you learn more? Go to http://salome-teresa.info/. Enter G191 in the search box to learn more about \"Fatigue: Care Instructions. \" Current as of: March 20, 2017 Content Version: 11.4 © 4945-9034 Healthwise, Incorporated. Care instructions adapted under license by littleBits Electronics (which disclaims liability or warranty for this information). If you have questions about a medical condition or this instruction, always ask your healthcare professional. [place] [address] any warranty or liability for your use of this information. Introducing hospitals & HEALTH SERVICES! Dear Usman Lee: 
Thank you for requesting a OpenSky account. Our records indicate that you already have an active OpenSky account. You can access your account anytime at [email or web address] Did you know that you can access your hospital and ER discharge instructions at any time in Lake [employer]? You can also review all of your test results from your hospital stay or ER visit. Additional Information If you have questions, please visit the Frequently Asked Questions section of the [employer] website at [email or web address] Remember, [employer] is NOT to be used for urgent needs. For medical emergencies, dial 911. Now available from your iPhone and Android! Please provide this summary of care documentation to your next provider. Your primary care clinician is listed as French Del Toro. If you have any questions after today's visit, please call [phone].

## 2017-11-24 NOTE — PATIENT INSTRUCTIONS
Fatigue: Care Instructions  Your Care Instructions    Fatigue is a feeling of tiredness, exhaustion, or lack of energy. You may feel fatigue because of too much or not enough activity. It can also come from stress, lack of sleep, boredom, and poor diet. Many medical problems, such as viral infections, can cause fatigue. Emotional problems, especially depression, are often the cause of fatigue. Fatigue is most often a symptom of another problem. Treatment for fatigue depends on the cause. For example, if you have fatigue because you have a certain health problem, treating this problem also treats your fatigue. If depression or anxiety is the cause, treatment may help. Follow-up care is a key part of your treatment and safety. Be sure to make and go to all appointments, and call your doctor if you are having problems. It's also a good idea to know your test results and keep a list of the medicines you take. How can you care for yourself at home? · Get regular exercise. But don't overdo it. Go back and forth between rest and exercise. · Get plenty of rest.  · Eat a healthy diet. Do not skip meals, especially breakfast.  · Reduce your use of caffeine, tobacco, and alcohol. Caffeine is most often found in coffee, tea, cola drinks, and chocolate. · Limit medicines that can cause fatigue. This includes tranquilizers and cold and allergy medicines. When should you call for help? Watch closely for changes in your health, and be sure to contact your doctor if:  ? · You have new symptoms such as fever or a rash. ? · Your fatigue gets worse. ? · You have been feeling down, depressed, or hopeless. Or you may have lost interest in things that you usually enjoy. ? · You are not getting better as expected. Where can you learn more? Go to http://salome-teresa.info/. Enter X099 in the search box to learn more about \"Fatigue: Care Instructions. \"  Current as of: March 20, 2017  Content Version: 11.4  © 2699-5202 Healthwise, Incorporated. Care instructions adapted under license by [employer]pop (which disclaims liability or warranty for this information). If you have questions about a medical condition or this instruction, always ask your healthcare professional. [address] any warranty or liability for your use of this information.

## 2017-11-24 NOTE — TELEPHONE ENCOUNTER
Third attempt to contact patient. Left message to please call office back. Will follow up with mailed letter.

## 2017-11-24 NOTE — PROGRESS NOTES
Valorie Qureshi is a 42 y.o. female presents to office for lethargic and fatigue      1.  Have you been to the ER, urgent care clinic or hospitalized since your last visit? no        Health Maintenance items with a due date reviewed with patient:  Health Maintenance Due   Topic Date Due    Influenza Age 5 to Adult  08/01/2017

## 2017-11-24 NOTE — PROGRESS NOTES
HISTORY OF PRESENT ILLNESS  Gabby Van is a 42 y.o. female who presents to center with fatigue. She began feeling fatigued about 3 days ago and has continued to feel like that daily with a gradually worsening. Today if she exerts too much energy she feels slightly short of breath, which resolved after resting for short time. Denies any sweats or chills or any recent illnesses. History of vitamin B deficiency as well as as vitamin D deficiency. About 6 months ago she was diagnosed by neurology of vitamin B deficiency and was started on vitamin B injections every couple of months. Her last injection was about 2 months ago and when she went a month ago to get her level checked it was high, so injection was not given. She has been taking vitamin D supplement on a routine basis but took the last tablet a month ago so needs a refill. Fatigue   The history is provided by the patient. This is a recurrent problem. The current episode started more than 2 days ago. The problem occurs daily. The problem has been gradually worsening. Associated symptoms include shortness of breath (with moderate exertion). Pertinent negatives include no chest pain. The symptoms are aggravated by exertion. She has tried rest for the symptoms. The treatment provided mild relief.        Visit Vitals    /67    Pulse 77    Temp 98.9 °F (37.2 °C) (Oral)    Resp 17    Ht 5' 4\" (1.626 m)    Wt 153 lb 12.8 oz (69.8 kg)    LMP 11/05/2017    SpO2 98%    BMI 26.4 kg/m2     Past Medical History:   Diagnosis Date    BV (bacterial vaginosis)     Chronic BV    Depression     Epilepsy ([facility] [address] 2001    Dr. Chen Vazquez H/O exercise stress test 11/26/2013    exercise nuc stress test wnl with EF 83%    Hematuria, microscopic 2015    Pelvic floor dysfunction     Recurrent UTI     Seizures (HCC)     Urinary frequency     UTI (urinary tract infection)      Current Outpatient Prescriptions on File Prior to Visit   Medication Sig Dispense Refill    metroNIDAZOLE (FLAGYL) 500 mg tablet Take 1 Tab by mouth two (2) times a day for 7 days. 14 Tab 0    Cholecalciferol, Vitamin D3, 50,000 unit cap Take 1 Cap by mouth every seven (7) days. 12 Cap 1    ibuprofen (MOTRIN) 800 mg tablet Take 1 Tab by mouth every eight (8) hours as needed for Pain. 90 Tab 3    lactobacillus, B.ani-L.aci-L.sal-L.plan-L. Esteban, 10 billion cell (2 billion ea) cap capsule Take 1 Cap by mouth daily. 30 Cap 3    topiramate (TOPAMAX) 100 mg tablet Take 1 Tab by mouth two (2) times a day. (Patient taking differently: Take 150 mg by mouth two (2) times a day.) 60 Tab 2    albuterol-ipratropium (DUO-NEB) 2.5 mg-0.5 mg/3 ml nebu 3 mL by Nebulization route every four (4) hours as needed. 30 Nebule 5    albuterol (PROVENTIL HFA, VENTOLIN HFA, PROAIR HFA) 90 mcg/actuation inhaler Take 2 Puffs by inhalation every four (4) hours as needed for Wheezing. 1 Inhaler 2     No current facility-administered medications on file prior to visit. Allergies   Allergen Reactions    Codeine Hives    Iodinated Contrast- Oral And Iv Dye Itching and Swelling    Iodine Unknown (comments)    Pecan Nut Other (comments)     Throat and ears itch    Prednisone Other (comments)    Shellfish Derived Swelling         Review of Systems   Constitutional: Positive for fatigue. Negative for chills and fever. Eyes: Negative for blurred vision. Respiratory: Positive for shortness of breath (with moderate exertion). Cardiovascular: Negative for chest pain. Gastrointestinal: Negative for nausea. Skin: Negative for rash. Neurological: Negative for dizziness, sensory change and focal weakness. Physical Exam   Constitutional: She is oriented to person, place, and time. She appears well-developed and well-nourished. No distress. Eyes: EOM are normal. Pupils are equal, round, and reactive to light. Neck: Neck supple. No thyromegaly present.    Cardiovascular: Normal rate, regular rhythm, normal heart sounds and intact distal pulses. No murmur heard. Pulmonary/Chest: Effort normal and breath sounds normal.   Lymphadenopathy:     She has no cervical adenopathy. Neurological: She is alert and oriented to person, place, and time. She has normal reflexes. Skin: Skin is warm and dry. No rash noted. Psychiatric: She has a normal mood and affect. Her behavior is normal. Judgment and thought content normal.       ASSESSMENT and PLAN    ICD-10-CM ICD-9-CM    1. Fatigue, unspecified type R53.83 780.79 CBC W/O DIFF      VITAMIN B12 & FOLATE      IRON PROFILE      VITAMIN D, 1, 25 DIHYDROXY     Labs ordered as noted. Vitamin D supplement prescription refilled. Patient informed that labs should be back in 3-4 days and for her to call and check on results if she has not been called. Patient verbalized understanding and agreed with plan.

## 2017-11-27 LAB
1,25(OH)2D3 SERPL-MCNC: 47.4 PG/ML (ref 19.9–79.3)
ERYTHROCYTE [DISTWIDTH] IN BLOOD BY AUTOMATED COUNT: 14.1 % (ref 12.3–15.4)
FOLATE SERPL-MCNC: 11.7 NG/ML
HCT VFR BLD AUTO: 35.4 % (ref 34–46.6)
HGB BLD-MCNC: 12.2 G/DL (ref 11.1–15.9)
IRON SATN MFR SERPL: 28 % (ref 15–55)
IRON SERPL-MCNC: 91 UG/DL (ref 27–159)
MCH RBC QN AUTO: 27.9 PG (ref 26.6–33)
MCHC RBC AUTO-ENTMCNC: 34.5 G/DL (ref 31.5–35.7)
MCV RBC AUTO: 81 FL (ref 79–97)
PLATELET # BLD AUTO: 253 X10E3/UL (ref 150–379)
RBC # BLD AUTO: 4.38 X10E6/UL (ref 3.77–5.28)
TIBC SERPL-MCNC: 323 UG/DL (ref 250–450)
UIBC SERPL-MCNC: 232 UG/DL (ref 131–425)
VIT B12 SERPL-MCNC: 458 PG/ML (ref 211–946)
WBC # BLD AUTO: 6.5 X10E3/UL (ref 3.4–10.8)

## 2018-01-22 ENCOUNTER — OFFICE VISIT (OUTPATIENT)
Dept: FAMILY MEDICINE CLINIC | Age: 38
End: 2018-01-22

## 2018-01-22 VITALS
HEIGHT: 64 IN | OXYGEN SATURATION: 98 % | WEIGHT: 153 LBS | BODY MASS INDEX: 26.12 KG/M2 | DIASTOLIC BLOOD PRESSURE: 74 MMHG | RESPIRATION RATE: 16 BRPM | SYSTOLIC BLOOD PRESSURE: 115 MMHG | TEMPERATURE: 97.4 F | HEART RATE: 57 BPM

## 2018-01-22 DIAGNOSIS — N30.00 ACUTE CYSTITIS WITHOUT HEMATURIA: Primary | ICD-10-CM

## 2018-01-22 DIAGNOSIS — R09.81 SINUS CONGESTION: ICD-10-CM

## 2018-01-22 LAB
BILIRUB UR QL STRIP: NEGATIVE
GLUCOSE UR-MCNC: NEGATIVE MG/DL
KETONES P FAST UR STRIP-MCNC: NEGATIVE MG/DL
PH UR STRIP: 5.5 [PH] (ref 4.6–8)
PROT UR QL STRIP: NEGATIVE
SP GR UR STRIP: 1.02 (ref 1–1.03)
UA UROBILINOGEN AMB POC: NORMAL (ref 0.2–1)
URINALYSIS CLARITY POC: NORMAL
URINALYSIS COLOR POC: YELLOW
URINE BLOOD POC: NEGATIVE
URINE LEUKOCYTES POC: NORMAL
URINE NITRITES POC: NEGATIVE

## 2018-01-22 RX ORDER — CIPROFLOXACIN 250 MG/1
250 TABLET, FILM COATED ORAL EVERY 12 HOURS
Qty: 20 TAB | Refills: 0 | Status: SHIPPED | OUTPATIENT
Start: 2018-01-22 | End: 2018-02-01

## 2018-01-22 RX ORDER — PHENAZOPYRIDINE HYDROCHLORIDE 100 MG/1
100 TABLET, FILM COATED ORAL
Qty: 12 TAB | Refills: 0 | Status: SHIPPED | OUTPATIENT
Start: 2018-01-22 | End: 2018-01-25

## 2018-01-22 NOTE — MR AVS SNAPSHOT
[address] [facility] [address] 
 
 
 [address] [facility] [address] [facility] [address] 
[phone] Patient: French Myrick MRN: [id number] :1980 Visit Information Date & Time Provider Department Dept. Phone Encounter #  
 2018 11:00 AM Yanelis Talbot [address] [facility] [phone] [id number] Your Appointments 2/3/2018 10:00 AM  
PHYSICAL with MATIAS Good [address] [facility] Appt Note: CPE  
 [address] [facility] [address] [place] [id number]  
[phone]  
  
   
 [address] [place] [address] Upcoming Health Maintenance Date Due  
 PAP AKA CERVICAL CYTOLOGY 2021 DTaP/Tdap/Td series (2 - Td) 2026 Allergies as of 2018  Review Complete On: 2018 By: Saint Bells, LPN Severity Noted Reaction Type Reactions Codeine  2013    Hives Iodinated Contrast- Oral And Iv Dye  2016    Itching, Swelling Iodine    Unknown (comments) Pecan Nut  03/15/2016    Other (comments) Throat and ears itch Prednisone  03/15/2016    Other (comments) Shellfish Derived  2016    Swelling Current Immunizations  Reviewed on 2016 Name Date Influenza Vaccine 10/1/2015, 2013 Influenza Vaccine (Quad) PF 2016 12:24 PM  
 Tdap 2016 12:25 PM  
  
 Not reviewed this visit You Were Diagnosed With   
  
 Codes Comments Acute cystitis without hematuria    -  Primary ICD-10-CM: N30.00 ICD-9-CM: 595.0 Sinus congestion     ICD-10-CM: R09.81 ICD-9-CM: 478.19 Vitals BP Pulse Temp Resp Height(growth percentile) Weight(growth percentile) 115/74 (!) 57 97.4 °F (36.3 °C) (Oral) 16 5' 4.02\" (1.626 m) 153 lb (69.4 kg) LMP SpO2 BMI OB Status Smoking Status 2018 98% 26.25 kg/m2 Unknown Never Smoker Vitals History BMI and BSA Data Body Mass Index Body Surface Area  
 26.25 kg/m 2 1.77 m 2 Preferred Pharmacy Pharmacy Name Phone [facility] . [facility] [address] [place] [address] [phone] Your Updated Medication List  
  
   
This list is accurate as of: 1/22/18 11:51 AM.  Always use your most recent med list.  
  
  
  
  
 albuterol 90 mcg/actuation inhaler Commonly known as:  PROVENTIL HFA, VENTOLIN HFA, PROAIR HFA Take 2 Puffs by inhalation every four (4) hours as needed for Wheezing. albuterol-ipratropium 2.5 mg-0.5 mg/3 ml Nebu Commonly known as:  DUO-NEB  
3 mL by Nebulization route every four (4) hours as needed. Cholecalciferol (Vitamin D3) 50,000 unit Cap Take 1 Cap by mouth every seven (7) days. ciprofloxacin HCl 250 mg tablet Commonly known as:  CIPRO Take 1 Tab by mouth every twelve (12) hours for 10 days. ibuprofen 800 mg tablet Commonly known as:  MOTRIN Take 1 Tab by mouth every eight (8) hours as needed for Pain. lactobacillus (B.ani-L.aci-L.sal-L.plan-L. Esteban) 10 billion cell (2 billion ea) Cap capsule Take 1 Cap by mouth daily. phenazopyridine 100 mg tablet Commonly known as:  PYRIDIUM Take 1 Tab by mouth three (3) times daily as needed for Pain for up to 3 days. topiramate 100 mg tablet Commonly known as:  TOPAMAX Take 1 Tab by mouth two (2) times a day. Prescriptions Sent to Pharmacy Refills  
 ciprofloxacin HCl (CIPRO) 250 mg tablet 0 Sig: Take 1 Tab by mouth every twelve (12) hours for 10 days. Class: Normal  
 Pharmacy: [address] [place] [address] KWABENA Christensen Ph #: [phone] Route: Oral  
 phenazopyridine (PYRIDIUM) 100 mg tablet 0 Sig: Take 1 Tab by mouth three (3) times daily as needed for Pain for up to 3 days. Class: Normal  
 Pharmacy: [address] [place] [address] SB[address] Alisa Ariza  #: [phone] Route: Oral  
  
We Performed the Following AMB POC URINALYSIS DIP STICK AUTO W/O MICRO [20536 CPT(R)] Patient Instructions Urinary Tract Infection in Women: Care Instructions Your Care Instructions A urinary tract infection, or UTI, is a general term for an infection anywhere between the kidneys and the urethra (where urine comes out). Most UTIs are bladder infections. They often cause pain or burning when you urinate. UTIs are caused by bacteria and can be cured with antibiotics. Be sure to complete your treatment so that the infection goes away. Follow-up care is a key part of your treatment and safety. Be sure to make and go to all appointments, and call your doctor if you are having problems. It's also a good idea to know your test results and keep a list of the medicines you take. How can you care for yourself at home? · Take your antibiotics as directed. Do not stop taking them just because you feel better. You need to take the full course of antibiotics. · Drink extra water and other fluids for the next day or two. This may help wash out the bacteria that are causing the infection. (If you have kidney, heart, or liver disease and have to limit fluids, talk with your doctor before you increase your fluid intake.) · Avoid drinks that are carbonated or have caffeine. They can irritate the bladder. · Urinate often. Try to empty your bladder each time. · To relieve pain, take a hot bath or lay a heating pad set on low over your lower belly or genital area. Never go to sleep with a heating pad in place. To prevent UTIs · Drink plenty of water each day. This helps you urinate often, which clears bacteria from your system. (If you have kidney, heart, or liver disease and have to limit fluids, talk with your doctor before you increase your fluid intake.) · Urinate when you need to. · Urinate right after you have sex. · Change sanitary pads often. · Avoid douches, bubble baths, feminine hygiene sprays, and other feminine hygiene products that have deodorants. · After going to the bathroom, wipe from front to back. When should you call for help? Call your doctor now or seek immediate medical care if: 
? · Symptoms such as fever, chills, nausea, or vomiting get worse or appear for the first time. ? · You have new pain in your back just below your rib cage. This is called flank pain. ? · There is new blood or pus in your urine. ? · You have any problems with your antibiotic medicine. ? Watch closely for changes in your health, and be sure to contact your doctor if: 
? · You are not getting better after taking an antibiotic for 2 days. ? · Your symptoms go away but then come back. Where can you learn more? Go to http://salome-teresa.info/. Enter N565 in the search box to learn more about \"Urinary Tract Infection in Women: Care Instructions. \" Current as of: May 12, 2017 Content Version: 11.4 © 0374-0502 [employer]. Care instructions adapted under license by [employer] (which disclaims liability or warranty for this information). If you have questions about a medical condition or this instruction, always ask your healthcare professional. [address] any warranty or liability for your use of this information. Introducing \[facility] [place] [facility]\"" & HEALTH SERVICES! Dear Ivis Ramos: 
Thank you for requesting a [employer] account. Our records indicate that you already have an active [employer] account. You can access your account anytime at [email or web address]. [email or web address] Did you know that you can access your hospital and ER discharge instructions at any time in [employer]? You can also review all of your test results from your hospital stay or ER visit. Additional Information If you have questions, please visit the Frequently Asked Questions section of the [employer] website at [email or web address][employer] [email or web address] Remember, [employer] is NOT to be used for urgent needs. For medical emergencies, dial 911. Now available from your iPhone and Android! Please provide this summary of care documentation to your next provider. Your primary care clinician is listed as Genesis Nichole. If you have any questions after today's visit, please call [phone].

## 2018-01-22 NOTE — PROGRESS NOTES
Lalo López is a 42 y.o. female presents in office to be seen for congestion and frequency x 5 days. Health Maintenance Due   Topic Date Due    Influenza Age 5 to Adult  08/01/2017       1. Have you been to the ER, urgent care clinic since your last visit? Hospitalized since your last visit?no    2. Have you seen or consulted any other health care providers outside of the [address] [place] [address] since your last visit? Include any pap smears or colon screening.  no

## 2018-01-22 NOTE — PROGRESS NOTES
HISTORY OF PRESENT ILLNESS  Chris Parmar is a 42 y.o. female who presents with about 6 days of urinary frequency, discomfort and malodorous urine. She increased her water intake a day after the symptoms started and have not resolved the symptoms but they have not seemed to worsen. She has had three days of moderate intermittent sinus pressure and congestion. She took Mucinex D yesterday and the day before and it only mildly help with the symptoms. She took it twice two days ago and after the second dose it caused her to be very drowsy and she fell asleep for a couple of hours, so she only took one dose yesterday. Nasal Congestion    The history is provided by the patient. This is a new problem. The current episode started 2 days ago. The problem has not changed since onset. There has been no fever. The pain is moderate. The pain has been intermittent since onset. Associated symptoms include congestion. Pertinent negatives include no chills, no ear pain, no sore throat, no cough and no headaches. She has tried decongestants for the symptoms. The treatment provided mild relief. Urinary Frequency    The history is provided by the patient. This is a new problem. The current episode started 2 days ago. The problem occurs every urination. The problem has not changed since onset. Quality: discomfort. There has been no fever. She is sexually active. There is no history of pyelonephritis. Associated symptoms include frequency. Pertinent negatives include no chills, no nausea and no abdominal pain. She has tried increased fluids for the symptoms.        Visit Vitals    /74    Pulse (!) 57    Temp 97.4 °F (36.3 °C) (Oral)    Resp 16    Ht 5' 4.02\" (1.626 m)    Wt 153 lb (69.4 kg)    LMP 01/19/2018    SpO2 98%    BMI 26.25 kg/m2     Past Medical History:   Diagnosis Date    BV (bacterial vaginosis)     Chronic BV    Depression     [facility]    Dr. Pura Mckoy H/O exercise stress test 11/26/2013    exercise nuc stress test wnl with EF 83%    Hematuria, microscopic 2015    Pelvic floor dysfunction     Recurrent UTI     Seizures (HCC)     Urinary frequency     UTI (urinary tract infection)          Review of Systems   Constitutional: Negative for chills, fever and malaise/fatigue. HENT: Positive for congestion and sinus pain. Negative for ear pain and sore throat. Eyes: Negative for discharge. Respiratory: Negative for cough. Gastrointestinal: Negative for abdominal pain and nausea. Genitourinary: Positive for frequency. Neurological: Negative for headaches. Physical Exam   Constitutional: No distress. HENT:   Right Ear: External ear normal.   Left Ear: External ear normal.   Nose: Nose normal.   Mouth/Throat: Oropharynx is clear and moist.   Mild tenderness to palpation of frontal sinuses   Eyes: Conjunctivae are normal.   Neck: Neck supple. Cardiovascular: Normal rate and normal heart sounds. No murmur heard. Pulmonary/Chest: Effort normal and breath sounds normal.   Abdominal: Soft. She exhibits no distension. There is no tenderness. Lymphadenopathy:     She has no cervical adenopathy. ASSESSMENT and PLAN    ICD-10-CM ICD-9-CM    1. Acute cystitis without hematuria N30.00 595.0 AMB POC URINALYSIS DIP STICK AUTO W/O MICRO   2. Sinus congestion R09.81 478.19      Patient is treated for UTI with cipro. The sinus pressure is either from soley the congestion or possibly from a viral infection. She is advised to discontinue the Mucinex D since she does not have any chest congestion. She is advised to purchase sudafed, keep well hydrated, and use a humidifier/warm steam to help congestion. Patient is to return to center if no improvement in the next 3-5 days or if there are worsening symptoms. Patient verbalized understanding and agreed with plan.

## 2018-02-03 ENCOUNTER — HOSPITAL ENCOUNTER (OUTPATIENT)
Dept: LAB | Age: 38
Discharge: HOME | End: 2018-02-03
Payer: COMMERCIAL

## 2018-02-03 ENCOUNTER — OFFICE VISIT (OUTPATIENT)
Dept: FAMILY MEDICINE CLINIC | Age: 38
End: 2018-02-03

## 2018-02-03 VITALS
DIASTOLIC BLOOD PRESSURE: 61 MMHG | OXYGEN SATURATION: 100 % | WEIGHT: 155 LBS | RESPIRATION RATE: 18 BRPM | TEMPERATURE: 98.3 F | HEART RATE: 66 BPM | HEIGHT: 64 IN | SYSTOLIC BLOOD PRESSURE: 107 MMHG | BODY MASS INDEX: 26.46 KG/M2

## 2018-02-03 DIAGNOSIS — Z00.00 ANNUAL PHYSICAL EXAM: ICD-10-CM

## 2018-02-03 DIAGNOSIS — Z00.00 ANNUAL PHYSICAL EXAM: Primary | ICD-10-CM

## 2018-02-03 DIAGNOSIS — E53.9 VITAMIN B DEFICIENCY: ICD-10-CM

## 2018-02-03 LAB
ALBUMIN SERPL-MCNC: 3.8 G/DL (ref 3.4–5)
ALBUMIN/GLOB SERPL: 1 {RATIO} (ref 0.8–1.7)
ALP SERPL-CCNC: 75 U/L (ref 45–117)
ALT SERPL-CCNC: 17 U/L (ref 13–56)
ANION GAP SERPL CALC-SCNC: 7 MMOL/L (ref 3–18)
AST SERPL-CCNC: 11 U/L (ref 15–37)
BILIRUB SERPL-MCNC: 0.4 MG/DL (ref 0.2–1)
BUN SERPL-MCNC: 15 MG/DL (ref 7–18)
BUN/CREAT SERPL: 15 (ref 12–20)
CALCIUM SERPL-MCNC: 8.5 MG/DL (ref 8.5–10.1)
CHLORIDE SERPL-SCNC: 113 MMOL/L (ref 100–108)
CHOLEST SERPL-MCNC: 175 MG/DL
CO2 SERPL-SCNC: 22 MMOL/L (ref 21–32)
CREAT SERPL-MCNC: 0.99 MG/DL (ref 0.6–1.3)
FOLATE SERPL-MCNC: >20 NG/ML (ref 3.1–17.5)
GLOBULIN SER CALC-MCNC: 3.9 G/DL (ref 2–4)
GLUCOSE SERPL-MCNC: 85 MG/DL (ref 74–99)
HDLC SERPL-MCNC: 67 MG/DL (ref 40–60)
HDLC SERPL: 2.6 {RATIO} (ref 0–5)
LDLC SERPL CALC-MCNC: 89.4 MG/DL (ref 0–100)
LIPID PROFILE,FLP: ABNORMAL
POTASSIUM SERPL-SCNC: 3.8 MMOL/L (ref 3.5–5.5)
PROT SERPL-MCNC: 7.7 G/DL (ref 6.4–8.2)
SODIUM SERPL-SCNC: 142 MMOL/L (ref 136–145)
TRIGL SERPL-MCNC: 93 MG/DL (ref ?–150)
VIT B12 SERPL-MCNC: 456 PG/ML (ref 211–911)
VLDLC SERPL CALC-MCNC: 18.6 MG/DL

## 2018-02-03 PROCEDURE — 82607 VITAMIN B-12: CPT | Performed by: PHYSICIAN ASSISTANT

## 2018-02-03 PROCEDURE — 80053 COMPREHEN METABOLIC PANEL: CPT | Performed by: PHYSICIAN ASSISTANT

## 2018-02-03 PROCEDURE — 36415 COLL VENOUS BLD VENIPUNCTURE: CPT | Performed by: PHYSICIAN ASSISTANT

## 2018-02-03 PROCEDURE — 82652 VIT D 1 25-DIHYDROXY: CPT | Performed by: PHYSICIAN ASSISTANT

## 2018-02-03 PROCEDURE — 80061 LIPID PANEL: CPT | Performed by: PHYSICIAN ASSISTANT

## 2018-02-03 NOTE — PROGRESS NOTES
History of Present Illness  Patient presents for annual physical.  She has a history of Vitamin D deficiency, Vitamin B deficiency, and migraines. The last had vitamin b injection two months ago at neurology. She has felt tired recently on a daily basis so would like to have her levels checked. No recent illnesses. Denies any pertinent PMH, not on any daily medications, or had any recent illnesses. Takes Topamax daily for migraine prevention. Denies any CP or SOB with physical activity. Visit Vitals    /61 (BP 1 Location: Right arm, BP Patient Position: Sitting)    Pulse 66    Temp 98.3 °F (36.8 °C) (Oral)    Resp 18    Ht 5' 4\" (1.626 m)    Wt 155 lb (70.3 kg)    LMP 01/15/2018 (Exact Date)  Comment: unregular timeing of cycles    SpO2 100%    BMI 26.61 kg/m2     Past Medical History:   Diagnosis Date    BV (bacterial vaginosis)     Chronic BV    Depression     [facility]    Dr. Lisa Saul H/O exercise stress test 11/26/2013    exercise nuc stress test wnl with EF 83%    Hematuria, microscopic 2015    Pelvic floor dysfunction     Recurrent UTI     Seizures (HCC)     Urinary frequency     UTI (urinary tract infection)      Social History     Social History    Marital status:      Spouse name: N/A    Number of children: N/A    Years of education: N/A     Occupational History    Not on file. Social History Main Topics    Smoking status: Never Smoker    Smokeless tobacco: Never Used    Alcohol use No    Drug use: No    Sexual activity: Yes     Partners: Male     Birth control/ protection: Surgical     Other Topics Concern    Not on file     Social History Narrative       Current Outpatient Prescriptions on File Prior to Visit   Medication Sig Dispense Refill    ibuprofen (MOTRIN) 800 mg tablet Take 1 Tab by mouth every eight (8) hours as needed for Pain. 90 Tab 3    lactobacillus, B.ani-L.aci-L.sal-L.plan-L. Esteban, 10 billion cell (2 billion ea) cap capsule Take 1 Cap by mouth daily. 30 Cap 3    albuterol (PROVENTIL HFA, VENTOLIN HFA, PROAIR HFA) 90 mcg/actuation inhaler Take 2 Puffs by inhalation every four (4) hours as needed for Wheezing. 1 Inhaler 2    topiramate (TOPAMAX) 100 mg tablet Take 1 Tab by mouth two (2) times a day. (Patient taking differently: Take 150 mg by mouth two (2) times a day.) 60 Tab 2     No current facility-administered medications on file prior to visit.       Allergies   Allergen Reactions    Codeine Hives    Iodinated Contrast- Oral And Iv Dye Itching and Swelling    Iodine Unknown (comments)    Pecan Nut Other (comments)     Throat and ears itch    Prednisone Other (comments)    Shellfish Derived Swelling       Review of Systems   History obtained from the patient  General: negative for - chills, fever; recent mild fatigue  Psychological: negative for - anxiety or depression  Ophthalmic: negative for - double vision, photophobia or uses glasses  ENT: negative for - epistaxis, headaches, vertigo or visual changes  Hematological and Lymphatic: negative for - bleeding problems, night sweats or swollen lymph nodes  Endocrine: negative for - breast changes, malaise/lethargy, palpitations or polydipsia/polyuria  Respiratory: no cough, shortness of breath, or wheezing  Cardiovascular: no chest pain or dyspnea on exertion  Gastrointestinal: no abdominal pain, change in bowel habits  Genito-Urinary: no dysuria, trouble voiding, or hematuria  Musculoskeletal: negative for - muscular weakness  Dermatological: negative for - eczema or rash    Physical Exam  General appearance - alert, well appearing, and in no distress  Mental status - alert, oriented to person, place, and time  Eyes - pupils equal and reactive, extraocular eye movements intact  Ears - bilateral TM's and external ear canals normal  Nose - normal and patent, no erythema, discharge or polyps  Mouth - mucous membranes moist, pharynx normal without lesions  Neck - supple, no significant adenopathy  Chest - clear to auscultation, no wheezes, rales or rhonchi, symmetric air entry  Heart - normal rate, regular rhythm, normal S1, S2, no murmurs, rubs, clicks or gallops  Abdomen - soft, nontender, nondistended, no masses or organomegaly  Neurological - alert, oriented, normal speech; DTRs 2/3 bilaterally  Musculoskeletal - no joint tenderness, deformity or swelling  Extremities - peripheral pulses normal, no pedal edema, no clubbing or cyanosis  Skin - normal coloration and turgor, no rashes, no suspicious skin lesions noted    Assessment and Plan  Patient appears to be in good health. No abnormalities on exam.  Labs ordered and drawn today.   Return in one year for annual physical exam.

## 2018-02-03 NOTE — MR AVS SNAPSHOT
[facility] [place] 
 
 
 [address] [facility] [address] [facility] [address] 
[phone] Patient: Mazin Gilmore MRN: [id number] :1980 Visit Information Date & Time Provider Department Dept. Phone Encounter #  
 2/3/2018 10:00 AM Abdirizak Arriaga, [address] [facility] [phone] [id number] Upcoming Health Maintenance Date Due  
 PAP AKA CERVICAL CYTOLOGY 2021 DTaP/Tdap/Td series (2 - Td) 2026 Allergies as of 2/3/2018  Review Complete On: 2018 By: Raquel Avila, LOUISN Severity Noted Reaction Type Reactions Codeine  2013    Hives Iodinated Contrast- Oral And Iv Dye  2016    Itching, Swelling Iodine    Unknown (comments) Pecan Nut  03/15/2016    Other (comments) Throat and ears itch Prednisone  03/15/2016    Other (comments) Shellfish Derived  2016    Swelling Current Immunizations  Reviewed on 2016 Name Date Influenza Vaccine 10/1/2015, 2013 Influenza Vaccine (Quad) PF 2016 12:24 PM  
 Tdap 2016 12:25 PM  
  
 Not reviewed this visit You Were Diagnosed With   
  
 Codes Comments Annual physical exam    -  Primary ICD-10-CM: Z00.00 ICD-9-CM: V70.0 Vitamin B deficiency     ICD-10-CM: E53.9 ICD-9-CM: 266.9 Vitals BP Pulse Temp Resp Height(growth percentile) Weight(growth percentile) 107/61 (BP 1 Location: Right arm, BP Patient Position: Sitting) 66 98.3 °F (36.8 °C) (Oral) 18 5' 4\" (1.626 m) 155 lb (70.3 kg) LMP SpO2 BMI OB Status Smoking Status 01/15/2018 (Exact Date) 100% 26.61 kg/m2 Unknown Never Smoker Vitals History BMI and BSA Data Body Mass Index Body Surface Area  
 26.61 kg/m 2 1.78 m 2 Preferred Pharmacy Pharmacy Name Phone [facility] [address] [place] [address] [facility] [phone] Your Updated Medication List  
  
 This list is accurate as of: 2/3/18 10:57 AM.  Always use your most recent med list.  
  
  
  
  
 albuterol 90 mcg/actuation inhaler Commonly known as:  PROVENTIL HFA, VENTOLIN HFA, PROAIR HFA Take 2 Puffs by inhalation every four (4) hours as needed for Wheezing. ibuprofen 800 mg tablet Commonly known as:  MOTRIN Take 1 Tab by mouth every eight (8) hours as needed for Pain. lactobacillus (B.ani-L.aci-L.sal-L.plan-L. Esteban) 10 billion cell (2 billion ea) Cap capsule Take 1 Cap by mouth daily. topiramate 100 mg tablet Commonly known as:  TOPAMAX Take 1 Tab by mouth two (2) times a day. Patient Instructions Well Visit, Ages 25 to 48: Care Instructions Your Care Instructions Physical exams can help you stay healthy. Your doctor has checked your overall health and may have suggested ways to take good care of yourself. He or she also may have recommended tests. At home, you can help prevent illness with healthy eating, regular exercise, and other steps. Follow-up care is a key part of your treatment and safety. Be sure to make and go to all appointments, and call your doctor if you are having problems. It's also a good idea to know your test results and keep a list of the medicines you take. How can you care for yourself at home? · Reach and stay at a healthy weight. This will lower your risk for many problems, such as obesity, diabetes, heart disease, and high blood pressure. · Get at least 30 minutes of physical activity on most days of the week. Walking is a good choice. You also may want to do other activities, such as running, swimming, cycling, or playing tennis or team sports. Discuss any changes in your exercise program with your doctor. · Do not smoke or allow others to smoke around you. If you need help quitting, talk to your doctor about stop-smoking programs and medicines. These can increase your chances of quitting for good. · Talk to your doctor about whether you have any risk factors for sexually transmitted infections (STIs). Having one sex partner (who does not have STIs and does not have sex with anyone else) is a good way to avoid these infections. · Use birth control if you do not want to have children at this time. Talk with your doctor about the choices available and what might be best for you. · Protect your skin from too much sun. When you're outdoors from 10 a.m. to 4 p.m., stay in the shade or cover up with clothing and a hat with a wide brim. Wear sunglasses that block UV rays. Even when it's cloudy, put broad-spectrum sunscreen (SPF 30 or higher) on any exposed skin. · See a dentist one or two times a year for checkups and to have your teeth cleaned. · Wear a seat belt in the car. · Drink alcohol in moderation, if at all. That means no more than 2 drinks a day for men and 1 drink a day for women. Follow your doctor's advice about when to have certain tests. These tests can spot problems early. For everyone · Cholesterol. Have the fat (cholesterol) in your blood tested after age [address]. Your doctor will tell you how often to have this done based on your age, family history, or other things that can increase your risk for heart disease. · Blood pressure. Have your blood pressure checked during a routine doctor visit. Your doctor will tell you how often to check your blood pressure based on your age, your blood pressure results, and other factors. · Vision. Talk with your doctor about how often to have a glaucoma test. 
· Diabetes. Ask your doctor whether you should have tests for diabetes. · Colon cancer. Have a test for colon cancer at age 48. You may have one of several tests. If you are younger than 48, you may need a test earlier if you have any risk factors.  Risk factors include whether you already had a precancerous polyp removed from your colon or whether your parent, brother, sister, or child has had colon cancer. For women · Breast exam and mammogram. Talk to your doctor about when you should have a clinical breast exam and a mammogram. Medical experts differ on whether and how often women under 50 should have these tests. Your doctor can help you decide what is right for you. · Pap test and pelvic exam. Begin Pap tests at age 24. A Pap test is the best way to find cervical cancer. The test often is part of a pelvic exam. Ask how often to have this test. 
· Tests for sexually transmitted infections (STIs). Ask whether you should have tests for STIs. You may be at risk if you have sex with more than one person, especially if your partners do not wear condoms. For men · Tests for sexually transmitted infections (STIs). Ask whether you should have tests for STIs. You may be at risk if you have sex with more than one person, especially if you do not wear a condom. · Testicular cancer exam. Ask your doctor whether you should check your testicles regularly. · Prostate exam. Talk to your doctor about whether you should have a blood test (called a PSA test) for prostate cancer. Experts differ on whether and when men should have this test. Some experts suggest it if you are older than 39 and are -American or have a father or brother who got prostate cancer when he was younger than 72. When should you call for help? Watch closely for changes in your health, and be sure to contact your doctor if you have any problems or symptoms that concern you. Where can you learn more? Go to http://salome-teresa.info/. Enter P072 in the search box to learn more about \"Well Visit, Ages 25 to 48: Care Instructions. \" Current as of: May 12, 2017 Content Version: 11.4 © 6443-5572 [employer].  Care instructions adapted under license by [employer] (which disclaims liability or warranty for this information). If you have questions about a medical condition or this instruction, always ask your healthcare professional. [address] any warranty or liability for your use of this information. Introducing [facility]! Dear Awa Torres: 
Thank you for requesting a [employer] account. Our records indicate that you already have an active [employer] account. You can access your account anytime at [email or web address] Did you know that you can access your hospital and ER discharge instructions at any time in [employer]? You can also review all of your test results from your hospital stay or ER visit. Additional Information If you have questions, please visit the Frequently Asked Questions section of the [employer] website at [email or web address] Remember, [employer] is NOT to be used for urgent needs. For medical emergencies, dial 911. Now available from your iPhone and Android! Please provide this summary of care documentation to your next provider. Your primary care clinician is listed as Anders Louise. If you have any questions after today's visit, please call [phone].

## 2018-02-03 NOTE — PATIENT INSTRUCTIONS

## 2018-02-03 NOTE — PROGRESS NOTES
Jaren Sullivan is a 42 y.o. female (: 1980) presenting to address:    Chief Complaint   Patient presents with    Well Woman       Vitals:    18 1010   Weight: 155 lb (70.3 kg)   Height: 5' 4\" (1.626 m)   PainSc:   0 - No pain   LMP: 01/15/2018       Hearing/Vision:   No exam data present    Learning Assessment:     Learning Assessment 2016   PRIMARY LEARNER Patient   HIGHEST LEVEL OF EDUCATION - PRIMARY LEARNER  4 YEARS OF COLLEGE   BARRIERS PRIMARY LEARNER NONE   CO-LEARNER CAREGIVER No   PRIMARY LANGUAGE ENGLISH   LEARNER PREFERENCE PRIMARY VIDEOS     LISTENING   ANSWERED BY self   RELATIONSHIP SELF     Depression Screening:     PHQ over the last two weeks 2/3/2018   Little interest or pleasure in doing things Not at all   Feeling down, depressed or hopeless Not at all   Total Score PHQ 2 0     Fall Risk Assessment:     Fall Risk Assessment, last 12 mths 2017   Able to walk? Yes   Fall in past 12 months? No     Abuse Screening:     Abuse Screening Questionnaire 2018   Do you ever feel afraid of your partner? N   Are you in a relationship with someone who physically or mentally threatens you? N   Is it safe for you to go home? Y     Coordination of Care Questionaire:   1. Have you been to the ER, urgent care clinic since your last visit? Hospitalized since your last visit?no    2. Have you seen or consulted any other health care providers outside of the [address] [place] [address] since your last visit? Include any pap smears or colon screening. no    Advanced Directive:   1. Do you have an Advanced Directive? no  2. Would you like information on Advanced Directives?  no

## 2018-02-06 LAB — 1,25(OH)2D3 SERPL-MCNC: 55.9 PG/ML (ref 19.9–79.3)

## 2018-02-12 ENCOUNTER — TELEPHONE (OUTPATIENT)
Dept: FAMILY MEDICINE CLINIC | Age: 38
End: 2018-02-12

## 2018-02-12 NOTE — TELEPHONE ENCOUNTER
----- Message from Bruna Holguin [place] sent at 2/11/2018 12:40 AM EST -----  please inform pt that all lab results are normal.

## 2018-02-12 NOTE — LETTER
2/13/2018 5:39 PM 
 
Ms. Tasha Shepherd [address] [facility] [address] [place] [id number] Dear Ms. Duff: We have been trying to reach you by telephone regarding your results. Please call the office back at [phone] at your earliest convenience. Thank you. Sincerely, MATIAS Ellsworth

## 2018-03-14 ENCOUNTER — HOSPITAL ENCOUNTER (OUTPATIENT)
Dept: LAB | Age: 38
Discharge: HOME | End: 2018-03-14
Payer: COMMERCIAL

## 2018-03-14 ENCOUNTER — OFFICE VISIT (OUTPATIENT)
Dept: FAMILY MEDICINE CLINIC | Age: 38
End: 2018-03-14

## 2018-03-14 VITALS
TEMPERATURE: 97.5 F | SYSTOLIC BLOOD PRESSURE: 103 MMHG | HEIGHT: 64 IN | HEART RATE: 81 BPM | OXYGEN SATURATION: 100 % | BODY MASS INDEX: 26.09 KG/M2 | WEIGHT: 152.8 LBS | DIASTOLIC BLOOD PRESSURE: 71 MMHG | RESPIRATION RATE: 16 BRPM

## 2018-03-14 DIAGNOSIS — H92.01 RIGHT EAR PAIN: ICD-10-CM

## 2018-03-14 DIAGNOSIS — R42 DIZZINESS: Primary | ICD-10-CM

## 2018-03-14 DIAGNOSIS — Z86.2 H/O: IRON DEFICIENCY ANEMIA: ICD-10-CM

## 2018-03-14 DIAGNOSIS — Z12.4 CERVICAL CANCER SCREENING: ICD-10-CM

## 2018-03-14 DIAGNOSIS — R42 DIZZINESS: ICD-10-CM

## 2018-03-14 DIAGNOSIS — H60.501 ACUTE OTITIS EXTERNA OF RIGHT EAR, UNSPECIFIED TYPE: ICD-10-CM

## 2018-03-14 DIAGNOSIS — B96.89 BACTERIAL VAGINAL INFECTION: ICD-10-CM

## 2018-03-14 DIAGNOSIS — N76.0 BACTERIAL VAGINAL INFECTION: ICD-10-CM

## 2018-03-14 DIAGNOSIS — N89.8 VAGINAL DISCHARGE: ICD-10-CM

## 2018-03-14 DIAGNOSIS — M41.9 SCOLIOSIS OF THORACOLUMBAR SPINE, UNSPECIFIED SCOLIOSIS TYPE: ICD-10-CM

## 2018-03-14 LAB
BASOPHILS # BLD: 0 K/UL (ref 0–0.06)
BASOPHILS NFR BLD: 1 % (ref 0–2)
DIFFERENTIAL METHOD BLD: NORMAL
EOSINOPHIL # BLD: 0.1 K/UL (ref 0–0.4)
EOSINOPHIL NFR BLD: 1 % (ref 0–5)
ERYTHROCYTE [DISTWIDTH] IN BLOOD BY AUTOMATED COUNT: 13.3 % (ref 11.6–14.5)
HCT VFR BLD AUTO: 39.8 % (ref 35–45)
HGB BLD-MCNC: 13.3 G/DL (ref 12–16)
LYMPHOCYTES # BLD: 3 K/UL (ref 0.9–3.6)
LYMPHOCYTES NFR BLD: 46 % (ref 21–52)
MCH RBC QN AUTO: 28.4 PG (ref 24–34)
MCHC RBC AUTO-ENTMCNC: 33.4 G/DL (ref 31–37)
MCV RBC AUTO: 85 FL (ref 74–97)
MONOCYTES # BLD: 0.5 K/UL (ref 0.05–1.2)
MONOCYTES NFR BLD: 7 % (ref 3–10)
NEUTS SEG # BLD: 2.8 K/UL (ref 1.8–8)
NEUTS SEG NFR BLD: 45 % (ref 40–73)
PLATELET # BLD AUTO: 280 K/UL (ref 135–420)
PMV BLD AUTO: 11.1 FL (ref 9.2–11.8)
RBC # BLD AUTO: 4.68 M/UL (ref 4.2–5.3)
WBC # BLD AUTO: 6.4 K/UL (ref 4.6–13.2)

## 2018-03-14 PROCEDURE — 88175 CYTOPATH C/V AUTO FLUID REDO: CPT | Performed by: LEGAL MEDICINE

## 2018-03-14 PROCEDURE — 87798 DETECT AGENT NOS DNA AMP: CPT | Performed by: LEGAL MEDICINE

## 2018-03-14 PROCEDURE — 87624 HPV HI-RISK TYP POOLED RSLT: CPT | Performed by: LEGAL MEDICINE

## 2018-03-14 PROCEDURE — 85025 COMPLETE CBC W/AUTO DIFF WBC: CPT | Performed by: LEGAL MEDICINE

## 2018-03-14 PROCEDURE — 36415 COLL VENOUS BLD VENIPUNCTURE: CPT | Performed by: LEGAL MEDICINE

## 2018-03-14 RX ORDER — SUMATRIPTAN 50 MG/1
TABLET, FILM COATED ORAL
Refills: 3 | COMMUNITY
Start: 2018-02-20 | End: 2018-04-14

## 2018-03-14 RX ORDER — ASPIRIN 325 MG
TABLET, DELAYED RELEASE (ENTERIC COATED) ORAL
Refills: 0 | COMMUNITY
Start: 2018-02-20 | End: 2018-04-14

## 2018-03-14 RX ORDER — CIPROFLOXACIN HYDROCHLORIDE 3.5 MG/ML
1 SOLUTION/ DROPS TOPICAL
Qty: 10 ML | Refills: 0 | Status: SHIPPED | OUTPATIENT
Start: 2018-03-14 | End: 2018-03-21 | Stop reason: CLARIF

## 2018-03-14 NOTE — TELEPHONE ENCOUNTER
Requested Prescriptions     Pending Prescriptions Disp Refills    ciprofloxacin-hydrocortisone (CIPRO HC OTIC) otic suspension       Sig: 3 Drops two (2) times a day.

## 2018-03-14 NOTE — PROGRESS NOTES
Tay Cowart is a 42 y.o. female (: 1980) presenting to address:    Chief Complaint   Patient presents with    Ear Pain    Dizziness    Vaginal Discharge     off white discharge       Vitals:    18 1550   BP: 103/71   Pulse: 81   Resp: 16   Temp: 97.5 °F (36.4 °C)   TempSrc: Oral   SpO2: 100%   Weight: 152 lb 12.8 oz (69.3 kg)   Height: 5' 4\" (1.626 m)   PainSc:   5   PainLoc: Ear   LMP: 2018       Hearing/Vision:   No exam data present    Learning Assessment:     Learning Assessment 2016   PRIMARY LEARNER Patient   HIGHEST LEVEL OF EDUCATION - PRIMARY LEARNER  4 YEARS OF COLLEGE   BARRIERS PRIMARY LEARNER NONE   CO-LEARNER CAREGIVER No   PRIMARY LANGUAGE ENGLISH   LEARNER PREFERENCE PRIMARY VIDEOS     LISTENING   ANSWERED BY self   RELATIONSHIP SELF     Depression Screening:     PHQ over the last two weeks 3/14/2018   Little interest or pleasure in doing things Not at all   Feeling down, depressed or hopeless Not at all   Total Score PHQ 2 0     Fall Risk Assessment:     Fall Risk Assessment, last 12 mths 2017   Able to walk? Yes   Fall in past 12 months? No     Abuse Screening:     Abuse Screening Questionnaire 3/14/2018   Do you ever feel afraid of your partner? N   Are you in a relationship with someone who physically or mentally threatens you? N   Is it safe for you to go home? Y     Coordination of Care Questionaire:   1. Have you been to the ER, urgent care clinic since your last visit? Hospitalized since your last visit? NO    2. Have you seen or consulted any other health care providers outside of the [address] [place] [address] since your last visit? Include any pap smears or colon screening.  NO

## 2018-03-15 ENCOUNTER — TELEPHONE (OUTPATIENT)
Dept: FAMILY MEDICINE CLINIC | Age: 38
End: 2018-03-15

## 2018-03-15 NOTE — TELEPHONE ENCOUNTER
Pharmacy called the Cipro that was called into pharmacy for the patient yesterday is not covered under her insurance and will be around $300 OOP. Pharmacy would like to know if there is an alternate medication that could be prescribed. Please advise.

## 2018-03-15 NOTE — TELEPHONE ENCOUNTER
Pt is calling to let us know that Cipro is too expensive and is asking for alternative. Please advise.

## 2018-03-15 NOTE — PROGRESS NOTES
[facility]     Chief Complaint   Patient presents with    Ear Pain    Dizziness    Vaginal Discharge     off white discharge     Vitals:    03/14/18 1550   BP: 103/71   Pulse: 81   Resp: 16   Temp: 97.5 °F (36.4 °C)   TempSrc: Oral   SpO2: 100%   Weight: 152 lb 12.8 oz (69.3 kg)   Height: 5' 4\" (1.626 m)   PainSc:   5   PainLoc: Ear   LMP: 02/20/2018         HPI Ms. Francisco Peterson she is here because of the dizziness she has been having for the last 3 days is not vertigo chest lightheadedness and occasionally she feels slightly lightheaded, no nausea no vomiting no blurred vision no chest pain no shortness of breath. She has not had any dizziness in the past.  She did have a history of iron deficiency anemia 2 years ago but her CBC from like last year was normal.    She had a right ear pain for the last few days there is no discharge. She does clean her ear repeatedly every day. She also complaining about vaginal discharge for the last few days it has a slight odor there is no itching, no pelvic pain no nausea no vomiting. She is she is also due for her Pap smear  .   Past Medical History:   Diagnosis Date    BV (bacterial vaginosis)     Chronic BV    Depression     Epilepsy ([facility] [address] 2001    Dr. Hermila Horan H/O exercise stress test 11/26/2013    exercise nuc stress test wnl with EF 83%    Hematuria, microscopic 2015    Pelvic floor dysfunction     Recurrent UTI     Seizures (HCC)     Urinary frequency     UTI (urinary tract infection)      Past Surgical History:   Procedure Laterality Date    HX CHOLECYSTECTOMY  2005    HX TUBAL LIGATION  2008    LAP,CHOLECYSTECTOMY       Social History   Substance Use Topics    Smoking status: Never Smoker    Smokeless tobacco: Never Used    Alcohol use No       Family History   Problem Relation Age of Onset    Thyroid Disease Mother     Alcohol abuse Father     Thyroid Disease Maternal Aunt     Thyroid Disease Maternal Grandmother     Cataract Paternal Grandmother        Review of Systems   Constitutional: Negative for chills, diaphoresis, fever, malaise/fatigue and weight loss. HENT: Positive for ear pain. Negative for congestion, ear discharge, hearing loss, nosebleeds, sinus pain and sore throat. Eyes: Negative for blurred vision, double vision and discharge. Respiratory: Negative for cough, hemoptysis, sputum production, shortness of breath and wheezing. Cardiovascular: Negative for chest pain, palpitations, claudication and leg swelling. Gastrointestinal: Negative for abdominal pain, constipation, diarrhea, heartburn, nausea and vomiting. Genitourinary: Negative for dysuria, frequency, hematuria and urgency. Musculoskeletal: Positive for back pain. Negative for joint pain, myalgias and neck pain. Skin: Negative for itching and rash. Neurological: Positive for dizziness. Negative for tingling, tremors, sensory change, speech change, focal weakness, weakness and headaches. Psychiatric/Behavioral: Negative for depression, hallucinations, substance abuse and suicidal ideas. The patient is not nervous/anxious and does not have insomnia. Physical Exam     Assessment and plan     1. Dizziness  Has a history of anemia so will check her CBC to see if that would be causing her dizziness    Also she had a right otitis externa also may be contributing to her dizziness  - CBC WITH AUTOMATED DIFF; Future    2. Right ear pain      3. Vaginal discharge  Culture was taken  - NUAB VAGINITIS PLUS; Future    4. H/O: iron deficiency anemia  CBC  - CBC WITH AUTOMATED DIFF; Future    5. Cervical cancer screening    - PAP IG, APTIMA HPV AND RFX 16/18,45 ([id number]); Future    6. Acute otitis externa of right ear, unspecified type    -Cipro eardrops    7.  Scoliosis of thoracolumbar spine, unspecified scoliosis type  Patient has scoliosis documented by x-rays she needs a form to be filled to her work so she can be provided with special care to sit and at work. Current Outpatient Prescriptions   Medication Sig Dispense Refill    Cholecalciferol, Vitamin D3, 50,000 unit cap   0    SUMAtriptan (IMITREX) 50 mg tablet   3    LACTOBACILLUS ACIDOPHILUS (PROBIOTIC PO) Take  by mouth.  ciprofloxacin HCl (CILOXAN) 0.3 % ophthalmic solution Administer 1 Drop to both eyes every two (2) hours. 10 mL 0    ibuprofen (MOTRIN) 800 mg tablet Take 1 Tab by mouth every eight (8) hours as needed for Pain. 90 Tab 3    albuterol (PROVENTIL HFA, VENTOLIN HFA, PROAIR HFA) 90 mcg/actuation inhaler Take 2 Puffs by inhalation every four (4) hours as needed for Wheezing. 1 Inhaler 2    topiramate (TOPAMAX) 100 mg tablet Take 1 Tab by mouth two (2) times a day. (Patient taking differently: Take 150 mg by mouth two (2) times a day.) 60 Tab 2    ciprofloxacin-hydrocortisone (CIPRO HC OTIC) otic suspension Administer 3 Drops in right ear two (2) times a day.  10 mL 0       Patient Active Problem List    Diagnosis Date Noted    Loose skin 07/27/2016    Allergic reaction 07/27/2016    Abnormal uterine bleeding (AUB) 07/27/2016    Urinary frequency 03/15/2016    Bacterial vaginosis 01/27/2016    Trichomonal vaginitis 01/27/2016    Routine health maintenance 01/27/2016    Vitamin D deficiency 01/25/2016    Recurrent UTI 01/22/2016    CKD (chronic kidney disease) stage 2, GFR 60-89 ml/min 05/13/2015    GARCIA (dyspnea on exertion) 03/03/2014    Seizure disorder ([facility] [address] 11/26/2013    Palpitations 11/26/2013    Chest pain 11/12/2013     Results for orders placed or performed during the hospital encounter of 02/03/18   LIPID PANEL   Result Value Ref Range    LIPID PROFILE          Cholesterol, total 175 <200 MG/DL    Triglyceride 93 <150 MG/DL    HDL Cholesterol 67 (H) 40 - 60 MG/DL    LDL, calculated 89.4 0 - 100 MG/DL    VLDL, calculated 18.6 MG/DL    CHOL/HDL Ratio 2.6 0 - 5.0     METABOLIC PANEL, COMPREHENSIVE   Result Value Ref Range    Sodium 142 136 - 145 mmol/L Potassium 3.8 3.5 - 5.5 mmol/L    Chloride 113 (H) 100 - 108 mmol/L    CO2 22 21 - 32 mmol/L    Anion gap 7 3.0 - 18 mmol/L    Glucose 85 74 - 99 mg/dL    BUN 15 7.0 - 18 MG/DL    Creatinine 0.99 0.6 - 1.3 MG/DL    BUN/Creatinine ratio 15 12 - 20      GFR est AA >60 >60 ml/min/1.73m2    GFR est non-AA >60 >60 ml/min/1.73m2    Calcium 8.5 8.5 - 10.1 MG/DL    Bilirubin, total 0.4 0.2 - 1.0 MG/DL    ALT (SGPT) 17 13 - 56 U/L    AST (SGOT) 11 (L) 15 - 37 U/L    Alk. phosphatase 75 45 - 117 U/L    Protein, total 7.7 6.4 - 8.2 g/dL    Albumin 3.8 3.4 - 5.0 g/dL    Globulin 3.9 2.0 - 4.0 g/dL    A-G Ratio 1.0 0.8 - 1.7     VITAMIN D, 1, 25 DIHYDROXY   Result Value Ref Range    Calcitriol (Vit D 1, 25 di-OH) 55.9 19.9 - 79.3 pg/mL   VITAMIN B12 & FOLATE   Result Value Ref Range    Vitamin B12 456 211 - 911 pg/mL    Folate >20.0 (H) 3.10 - 17.50 ng/mL     Hospital Outpatient Visit on 03/14/2018   Component Date Value Ref Range Status    WBC 03/14/2018 6.4  4.6 - 13.2 K/uL Final    RBC 03/14/2018 4.68  4.20 - 5.30 M/uL Final    HGB 03/14/2018 13.3  12.0 - 16.0 g/dL Final    HCT 03/14/2018 39.8  35.0 - 45.0 % Final    MCV 03/14/2018 85.0  74.0 - 97.0 FL Final    MCH 03/14/2018 28.4  24.0 - 34.0 PG Final    MCHC 03/14/2018 33.4  31.0 - 37.0 g/dL Final    RDW 03/14/2018 13.3  11.6 - 14.5 % Final    PLATELET 64/34/8376 380  135 - 420 K/uL Final    MPV 03/14/2018 11.1  9.2 - 11.8 FL Final    NEUTROPHILS 03/14/2018 45  40 - 73 % Final    LYMPHOCYTES 03/14/2018 46  21 - 52 % Final    MONOCYTES 03/14/2018 7  3 - 10 % Final    EOSINOPHILS 03/14/2018 1  0 - 5 % Final    BASOPHILS 03/14/2018 1  0 - 2 % Final    ABS. NEUTROPHILS 03/14/2018 2.8  1.8 - 8.0 K/UL Final    ABS. LYMPHOCYTES 03/14/2018 3.0  0.9 - 3.6 K/UL Final    ABS. MONOCYTES 03/14/2018 0.5  0.05 - 1.2 K/UL Final    ABS. EOSINOPHILS 03/14/2018 0.1  0.0 - 0.4 K/UL Final    ABS.  BASOPHILS 03/14/2018 0.0  0.0 - 0.06 K/UL Final    DF 03/14/2018 AUTOMATED Final   Hospital Outpatient Visit on 02/03/2018   Component Date Value Ref Range Status    LIPID PROFILE 02/03/2018        Final    Cholesterol, total 02/03/2018 175  <200 MG/DL Final    Triglyceride 02/03/2018 93  <150 MG/DL Final    Comment: The drugs N-acetylcysteine (NAC) and  Metamiszole have been found to cause falsely  low results in this chemical assay. Please  be sure to submit blood samples obtained  BEFORE administration of either of these  drugs to assure correct results.  HDL Cholesterol 02/03/2018 67* 40 - 60 MG/DL Final    LDL, calculated 02/03/2018 89.4  0 - 100 MG/DL Final    VLDL, calculated 02/03/2018 18.6  MG/DL Final    CHOL/HDL Ratio 02/03/2018 2.6  0 - 5.0   Final    Sodium 02/03/2018 142  136 - 145 mmol/L Final    Potassium 02/03/2018 3.8  3.5 - 5.5 mmol/L Final    Chloride 02/03/2018 113* 100 - 108 mmol/L Final    CO2 02/03/2018 22  21 - 32 mmol/L Final    Anion gap 02/03/2018 7  3.0 - 18 mmol/L Final    Glucose 02/03/2018 85  74 - 99 mg/dL Final    BUN 02/03/2018 15  7.0 - 18 MG/DL Final    Creatinine 02/03/2018 0.99  0.6 - 1.3 MG/DL Final    BUN/Creatinine ratio 02/03/2018 15  12 - 20   Final    GFR est AA 02/03/2018 >60  >60 ml/min/1.73m2 Final    GFR est non-AA 02/03/2018 >60  >60 ml/min/1.73m2 Final    Comment: (NOTE)  Estimated GFR is calculated using the Modification of Diet in Renal   Disease (MDRD) Study equation, reported for both  Americans   (GFRAA) and non- Americans (GFRNA), and normalized to 1.73m2   body surface area. The physician must decide which value applies to   the patient. The MDRD study equation should only be used in   individuals age 25 or older. It has not been validated for the   following: pregnant women, patients with serious comorbid conditions,   or on certain medications, or persons with extremes of body size,   muscle mass, or nutritional status.       Calcium 02/03/2018 8.5  8.5 - 10.1 MG/DL Final    Bilirubin, total 02/03/2018 0.4  0.2 - 1.0 MG/DL Final    ALT (SGPT) 02/03/2018 17  13 - 56 U/L Final    AST (SGOT) 02/03/2018 11* 15 - 37 U/L Final    Alk. phosphatase 02/03/2018 75  45 - 117 U/L Final    Protein, total 02/03/2018 7.7  6.4 - 8.2 g/dL Final    Albumin 02/03/2018 3.8  3.4 - 5.0 g/dL Final    Globulin 02/03/2018 3.9  2.0 - 4.0 g/dL Final    A-G Ratio 02/03/2018 1.0  0.8 - 1.7   Final    Calcitriol (Vit D 1, 25 di-OH) 02/03/2018 55.9  19.9 - 79.3 pg/mL Final    Comment: (NOTE)  Performed At: [address] [id number]  Giuseppe Hayden MD [id number]      Vitamin B12 02/03/2018 456  211 - 911 pg/mL Final    Folate 02/03/2018 >20.0* 3.10 - 17.50 ng/mL Final   Office Visit on 01/22/2018   Component Date Value Ref Range Status    Color (UA POC) 01/22/2018 Yellow   Preliminary    Clarity (UA POC) 01/22/2018 Cloudy   Preliminary    Glucose (UA POC) 01/22/2018 Negative  Negative Preliminary    Bilirubin (UA POC) 01/22/2018 Negative  Negative Preliminary    Ketones (UA POC) 01/22/2018 Negative  Negative Preliminary    Specific gravity (UA POC) 01/22/2018 1.020  1.001 - 1.035 Preliminary    Blood (UA POC) 01/22/2018 Negative  Negative Preliminary    pH (UA POC) 01/22/2018 5.5  4.6 - 8.0 Preliminary    Protein (UA POC) 01/22/2018 Negative  Negative Preliminary    Urobilinogen (UA POC) 01/22/2018 0.2 mg/dL  0.2 - 1 Preliminary    Nitrites (UA POC) 01/22/2018 Negative  Negative Preliminary    Leukocyte esterase (UA POC) 01/22/2018 1+  Negative Preliminary          Follow-up Disposition:  Return if symptoms worsen or fail to improve.

## 2018-03-19 LAB
A VAGINAE DNA VAG QL NAA+PROBE: NORMAL SCORE
BVAB2 DNA VAG QL NAA+PROBE: NORMAL SCORE
C ALBICANS DNA VAG QL NAA+PROBE: NEGATIVE
C GLABRATA DNA VAG QL NAA+PROBE: NEGATIVE
C TRACH RRNA SPEC QL NAA+PROBE: NEGATIVE
MEGA1 DNA VAG QL NAA+PROBE: NORMAL SCORE
N GONORRHOEA RRNA SPEC QL NAA+PROBE: NEGATIVE
SPECIMEN SOURCE: NORMAL
T VAGINALIS RRNA SPEC QL NAA+PROBE: NEGATIVE

## 2018-03-20 ENCOUNTER — TELEPHONE (OUTPATIENT)
Dept: FAMILY MEDICINE CLINIC | Age: 38
End: 2018-03-20

## 2018-03-20 RX ORDER — METRONIDAZOLE 500 MG/1
500 TABLET ORAL 3 TIMES DAILY
Qty: 30 TAB | Refills: 0 | Status: SHIPPED | OUTPATIENT
Start: 2018-03-20 | End: 2018-03-30

## 2018-03-20 NOTE — TELEPHONE ENCOUNTER
----- Message from Stephani Block MD sent at 3/20/2018  3:29 PM EDT -----  Bacterial vaginosis ,falgyl was sent to the pharmacy

## 2018-03-21 RX ORDER — NEOMYCIN SULFATE, POLYMYXIN B SULFATE AND HYDROCORTISONE 10; 3.5; 1 MG/ML; MG/ML; [USP'U]/ML
3 SUSPENSION/ DROPS AURICULAR (OTIC) 4 TIMES DAILY
Qty: 10 ML | Refills: 0 | Status: SHIPPED | OUTPATIENT
Start: 2018-03-21 | End: 2018-04-14

## 2018-04-13 ENCOUNTER — OFFICE VISIT (OUTPATIENT)
Dept: FAMILY MEDICINE CLINIC | Age: 38
End: 2018-04-13

## 2018-04-13 ENCOUNTER — HOSPITAL ENCOUNTER (OUTPATIENT)
Dept: LAB | Age: 38
Discharge: HOME | End: 2018-04-13
Payer: COMMERCIAL

## 2018-04-13 VITALS
TEMPERATURE: 97 F | OXYGEN SATURATION: 98 % | HEART RATE: 91 BPM | WEIGHT: 152 LBS | HEIGHT: 64 IN | SYSTOLIC BLOOD PRESSURE: 109 MMHG | DIASTOLIC BLOOD PRESSURE: 68 MMHG | BODY MASS INDEX: 25.95 KG/M2 | RESPIRATION RATE: 16 BRPM

## 2018-04-13 DIAGNOSIS — R06.00 DYSPNEA, UNSPECIFIED TYPE: ICD-10-CM

## 2018-04-13 DIAGNOSIS — R35.0 URINARY FREQUENCY: ICD-10-CM

## 2018-04-13 DIAGNOSIS — R35.0 URINARY FREQUENCY: Primary | ICD-10-CM

## 2018-04-13 DIAGNOSIS — N39.0 URINARY TRACT INFECTION WITHOUT HEMATURIA, SITE UNSPECIFIED: ICD-10-CM

## 2018-04-13 LAB
BILIRUB UR QL STRIP: NEGATIVE
GLUCOSE UR-MCNC: NEGATIVE MG/DL
KETONES P FAST UR STRIP-MCNC: NEGATIVE MG/DL
PH UR STRIP: 8.5 [PH] (ref 4.6–8)
PROT UR QL STRIP: NORMAL
SP GR UR STRIP: 1.02 (ref 1–1.03)
UA UROBILINOGEN AMB POC: NORMAL (ref 0.2–1)
URINALYSIS CLARITY POC: CLEAR
URINALYSIS COLOR POC: NORMAL
URINE BLOOD POC: NEGATIVE
URINE LEUKOCYTES POC: NORMAL
URINE NITRITES POC: NEGATIVE

## 2018-04-13 PROCEDURE — 87086 URINE CULTURE/COLONY COUNT: CPT | Performed by: LEGAL MEDICINE

## 2018-04-13 PROCEDURE — 87186 SC STD MICRODIL/AGAR DIL: CPT | Performed by: LEGAL MEDICINE

## 2018-04-13 PROCEDURE — 87077 CULTURE AEROBIC IDENTIFY: CPT | Performed by: LEGAL MEDICINE

## 2018-04-13 RX ORDER — CIPROFLOXACIN 500 MG/1
500 TABLET ORAL 2 TIMES DAILY
Qty: 20 TAB | Refills: 0 | Status: SHIPPED | OUTPATIENT
Start: 2018-04-13 | End: 2018-04-13 | Stop reason: SDUPTHER

## 2018-04-13 RX ORDER — CIPROFLOXACIN 500 MG/1
500 TABLET ORAL 2 TIMES DAILY
Qty: 20 TAB | Refills: 0 | Status: SHIPPED | OUTPATIENT
Start: 2018-04-13 | End: 2018-04-23

## 2018-04-13 NOTE — PATIENT INSTRUCTIONS

## 2018-04-13 NOTE — PROGRESS NOTES
Argenis Singh is a 42 y.o. female presents in office to be seen for frequent urination. There are no preventive care reminders to display for this patient. 1. Have you been to the ER, urgent care clinic since your last visit? Hospitalized since your last visit?no    2. Have you seen or consulted any other health care providers outside of the [address] [place] [address] since your last visit? Include any pap smears or colon screening.  no

## 2018-04-14 NOTE — PROGRESS NOTES
Dearmeka Louis     Chief Complaint   Patient presents with    Urinary Frequency     Vitals:    04/13/18 1624   BP: 109/68   Pulse: 91   Resp: 16   Temp: 97 °F (36.1 °C)   TempSrc: Oral   SpO2: 98%   Weight: 152 lb (68.9 kg)   Height: 5' 4.02\" (1.626 m)   PainSc:   0 - No pain         HPI: Patient is complaining about dysuria increased frequency and urgency, she has been drinking less water and more caffeine due to work stress, and for the last few days she has been having burning urination and dysuria. No fever no chills no flank pain no abdominal pain no diarrhea no vomiting. Patient does have a history of cough and wheezing in the past and she has been using Ventolin inhaler to relieve her symptoms, has not been diagnosed with asthma will refer to pulmonology to get pulmonary function. Past Medical History:   Diagnosis Date    BV (bacterial vaginosis)     Chronic BV    Depression     Epilepsy ([facility] [place] [facility] 2001    Dr. Xiang Arroyo H/O exercise stress test 11/26/2013    exercise nuc stress test wnl with EF 83%    Hematuria, microscopic 2015    Pelvic floor dysfunction     Recurrent UTI     Seizures (HCC)     Urinary frequency     UTI (urinary tract infection)      Past Surgical History:   Procedure Laterality Date    HX CHOLECYSTECTOMY  2005    HX TUBAL LIGATION  2008    LAP,CHOLECYSTECTOMY       Social History   Substance Use Topics    Smoking status: Never Smoker    Smokeless tobacco: Never Used    Alcohol use No       Family History   Problem Relation Age of Onset    Thyroid Disease Mother     Alcohol abuse Father     Thyroid Disease Maternal Aunt     Thyroid Disease Maternal Grandmother     Cataract Paternal Grandmother        Review of Systems   Constitutional: Negative for chills, fever, malaise/fatigue and weight loss. HENT: Negative for congestion, ear discharge, ear pain, hearing loss, nosebleeds, sinus pain and sore throat.     Eyes: Negative for blurred vision, double vision and discharge. Respiratory: Positive for cough and shortness of breath. Negative for hemoptysis, sputum production and wheezing. Cardiovascular: Negative for chest pain, palpitations, claudication and leg swelling. Gastrointestinal: Negative for abdominal pain, constipation, diarrhea, nausea and vomiting. Genitourinary: Positive for dysuria, frequency and urgency. Negative for flank pain and hematuria. Musculoskeletal: Negative for back pain, joint pain, myalgias and neck pain. Skin: Negative for itching and rash. Neurological: Negative for dizziness, tingling, sensory change, speech change, focal weakness, weakness and headaches. Psychiatric/Behavioral: Positive for hallucinations. Physical Exam   Constitutional: She is oriented to person, place, and time. She appears well-developed and well-nourished. No distress. HENT:   Head: Normocephalic and atraumatic. Mouth/Throat: Oropharynx is clear and moist. No oropharyngeal exudate. Eyes: Conjunctivae are normal. Pupils are equal, round, and reactive to light. Right eye exhibits no discharge. Left eye exhibits no discharge. No scleral icterus. Neck: No thyromegaly present. Cardiovascular: Normal rate, regular rhythm and normal heart sounds. No murmur heard. Pulmonary/Chest: Effort normal and breath sounds normal. No respiratory distress. She has no wheezes. She has no rales. She exhibits no tenderness. Abdominal: Soft. She exhibits no distension. There is no tenderness. There is no rebound. Musculoskeletal: Normal range of motion. She exhibits no edema or deformity. Lymphadenopathy:     She has no cervical adenopathy. Neurological: She is alert and oriented to person, place, and time. No cranial nerve deficit. Coordination normal.   Skin: Skin is warm and dry. No rash noted. She is not diaphoretic. No erythema. No pallor. Psychiatric: She has a normal mood and affect.  Her behavior is normal. Judgment and thought content normal.        Assessment and plan     1. Urinary frequency    - AMB POC URINALYSIS DIP STICK AUTO W/O MICRO  - CULTURE, URINE; Future  - ciprofloxacin HCl (CIPRO) 500 mg tablet; Take 1 Tab by mouth two (2) times a day for 10 days. Dispense: 20 Tab; Refill: 0    2. Urinary tract infection without hematuria, site unspecified  Send the urine for culture and start her on Septra  - ciprofloxacin HCl (CIPRO) 500 mg tablet; Take 1 Tab by mouth two (2) times a day for 10 days. Dispense: 20 Tab; Refill: 0    3. Dyspnea, unspecified type  Patient has been having episodes of cough and wheezing relieved by a vasodilator  , Has not had a pulmonary function tests will refer her for pulmonology for diagnosed. - REFERRAL TO PULMONARY DISEASE    Current Outpatient Prescriptions   Medication Sig Dispense Refill    ciprofloxacin HCl (CIPRO) 500 mg tablet Take 1 Tab by mouth two (2) times a day for 10 days. 20 Tab 0    albuterol (PROVENTIL HFA, VENTOLIN HFA, PROAIR HFA) 90 mcg/actuation inhaler Take 2 Puffs by inhalation every four (4) hours as needed for Wheezing.  1 Inhaler 2       Patient Active Problem List    Diagnosis Date Noted    Loose skin 07/27/2016    Allergic reaction 07/27/2016    Abnormal uterine bleeding (AUB) 07/27/2016    Urinary frequency 03/15/2016    Bacterial vaginosis 01/27/2016    Trichomonal vaginitis 01/27/2016    Routine health maintenance 01/27/2016    Vitamin D deficiency 01/25/2016    Recurrent UTI 01/22/2016    CKD (chronic kidney disease) stage 2, GFR 60-89 ml/min 05/13/2015    GARCIA (dyspnea on exertion) 03/03/2014    Seizure disorder ([facility] [address] 11/26/2013    Palpitations 11/26/2013    Chest pain 11/12/2013     Results for orders placed or performed in visit on 04/13/18   AMB POC URINALYSIS DIP STICK AUTO W/O MICRO   Result Value Ref Range    Color (UA POC) Orange     Clarity (UA POC) Clear     Glucose (UA POC) Negative Negative    Bilirubin (UA POC) Negative Negative    Ketones (UA POC) Negative Negative    Specific gravity (UA POC) 1.020 1.001 - 1.035    Blood (UA POC) Negative Negative    pH (UA POC) 8.5 (A) 4.6 - 8.0    Protein (UA POC) 2+ Negative    Urobilinogen (UA POC) 1 mg/dL 0.2 - 1    Nitrites (UA POC) Negative Negative    Leukocyte esterase (UA POC) 3+ Negative     Hospital Outpatient Visit on 04/13/2018   Component Date Value Ref Range Status    Special Requests: 04/13/2018 NO SPECIAL REQUESTS    Preliminary    Culture result: 04/13/2018 [id number] COLONIES/mL GRAM NEGATIVE RODS*   Preliminary   Office Visit on 04/13/2018   Component Date Value Ref Range Status    Color (UA POC) 04/13/2018 [place]   Final    Clarity (UA POC) 04/13/2018 Clear   Final    Glucose (UA POC) 04/13/2018 Negative  Negative Final    Bilirubin (UA POC) 04/13/2018 Negative  Negative Final    Ketones (UA POC) 04/13/2018 Negative  Negative Final    Specific gravity (UA POC) 04/13/2018 1.020  1.001 - 1.035 Final    Blood (UA POC) 04/13/2018 Negative  Negative Final    pH (UA POC) 04/13/2018 8.5* 4.6 - 8.0 Final    Protein (UA POC) 04/13/2018 2+  Negative Final    Urobilinogen (UA POC) 04/13/2018 1 mg/dL  0.2 - 1 Final    Nitrites (UA POC) 04/13/2018 Negative  Negative Final    Leukocyte esterase (UA POC) 04/13/2018 3+  Negative Final   Hospital Outpatient Visit on 03/14/2018   Component Date Value Ref Range Status    WBC 03/14/2018 6.4  4.6 - 13.2 K/uL Final    RBC 03/14/2018 4.68  4.20 - 5.30 M/uL Final    HGB 03/14/2018 13.3  12.0 - 16.0 g/dL Final    HCT 03/14/2018 39.8  35.0 - 45.0 % Final    MCV 03/14/2018 85.0  74.0 - 97.0 FL Final    MCH 03/14/2018 28.4  24.0 - 34.0 PG Final    MCHC 03/14/2018 33.4  31.0 - 37.0 g/dL Final    RDW 03/14/2018 13.3  11.6 - 14.5 % Final    PLATELET 11/09/7527 336  135 - 420 K/uL Final    MPV 03/14/2018 11.1  9.2 - 11.8 FL Final    NEUTROPHILS 03/14/2018 45  40 - 73 % Final    LYMPHOCYTES 03/14/2018 46  21 - 52 % Final    MONOCYTES 03/14/2018 7  3 - 10 % Final  EOSINOPHILS 03/14/2018 1  0 - 5 % Final    BASOPHILS 03/14/2018 1  0 - 2 % Final    ABS. NEUTROPHILS 03/14/2018 2.8  1.8 - 8.0 K/UL Final    ABS. LYMPHOCYTES 03/14/2018 3.0  0.9 - 3.6 K/UL Final    ABS. MONOCYTES 03/14/2018 0.5  0.05 - 1.2 K/UL Final    ABS. EOSINOPHILS 03/14/2018 0.1  0.0 - 0.4 K/UL Final    ABS. BASOPHILS 03/14/2018 0.0  0.0 - 0.06 K/UL Final    DF 03/14/2018 AUTOMATED    Final    Source 03/14/2018 VAGINAL    Final    Atopobium vaginae 03/14/2018 SEE COMMENT  Score Final    Comment: (NOTE)  RESULT:High - 2      BVAB 2 03/14/2018 SEE COMMENT  Score Final    Comment: (NOTE)  RESULT:High - 2      Megasphaera 1 03/14/2018 SEE COMMENT  Score Final    Comment: (NOTE)  RESULT:High - 2      C. albicans, TRINO 03/14/2018 NEGATIVE   NEGATIVE   Final    C. glabrata, TRINO 03/14/2018 NEGATIVE   NEGATIVE   Final    Comment: (NOTE)  This test was developed and its performance characteristics  determined by [facility].  It has not been cleared or approved by the  Food and Drug Administration. The FDA has determined that such  clearance or approval is not necessary.   Performed At: [address] [id number]  Bobby Gaffney MD [id number]     Rosey Cast, TRINO 03/14/2018 NEGATIVE   NEGATIVE   Final    Comment: (NOTE)  Performed At: [facility] [address] [id number]  Bobby Gaffney MD [id number]      C. trachomatis, TRINO 03/14/2018 NEGATIVE   NEGATIVE   Final    N. gonorrhoeae, TRINO 03/14/2018 NEGATIVE   NEGATIVE   Final    Comment: (NOTE)  Performed At: [address] [id number]  [address] ZS:[id number]     Hospital Outpatient Visit on 02/03/2018   Component Date Value Ref Range Status    LIPID PROFILE 02/03/2018        Final    Cholesterol, total 02/03/2018 175  <200 MG/DL Final    Triglyceride 02/03/2018 93  <150 MG/DL Final    Comment: The drugs N-acetylcysteine (NAC) and  Metamiszole have been found to cause falsely  low results in this chemical assay. Please  be sure to submit blood samples obtained  BEFORE administration of either of these  drugs to assure correct results.  HDL Cholesterol 02/03/2018 67* 40 - 60 MG/DL Final    LDL, calculated 02/03/2018 89.4  0 - 100 MG/DL Final    VLDL, calculated 02/03/2018 18.6  MG/DL Final    CHOL/HDL Ratio 02/03/2018 2.6  0 - 5.0   Final    Sodium 02/03/2018 142  136 - 145 mmol/L Final    Potassium 02/03/2018 3.8  3.5 - 5.5 mmol/L Final    Chloride 02/03/2018 113* 100 - 108 mmol/L Final    CO2 02/03/2018 22  21 - 32 mmol/L Final    Anion gap 02/03/2018 7  3.0 - 18 mmol/L Final    Glucose 02/03/2018 85  74 - 99 mg/dL Final    BUN 02/03/2018 15  7.0 - 18 MG/DL Final    Creatinine 02/03/2018 0.99  0.6 - 1.3 MG/DL Final    BUN/Creatinine ratio 02/03/2018 15  12 - 20   Final    GFR est AA 02/03/2018 >60  >60 ml/min/1.73m2 Final    GFR est non-AA 02/03/2018 >60  >60 ml/min/1.73m2 Final    Comment: (NOTE)  Estimated GFR is calculated using the Modification of Diet in Renal   Disease (MDRD) Study equation, reported for both  Americans   (GFRAA) and non- Americans (GFRNA), and normalized to 1.73m2   body surface area. The physician must decide which value applies to   the patient. The MDRD study equation should only be used in   individuals age 25 or older. It has not been validated for the   following: pregnant women, patients with serious comorbid conditions,   or on certain medications, or persons with extremes of body size,   muscle mass, or nutritional status.  Calcium 02/03/2018 8.5  8.5 - 10.1 MG/DL Final    Bilirubin, total 02/03/2018 0.4  0.2 - 1.0 MG/DL Final    ALT (SGPT) 02/03/2018 17  13 - 56 U/L Final    AST (SGOT) 02/03/2018 11* 15 - 37 U/L Final    Alk.  phosphatase 02/03/2018 75  45 - 117 U/L Final    Protein, total 02/03/2018 7.7  6.4 - 8.2 g/dL Final    Albumin 02/03/2018 3.8  3.4 - 5.0 g/dL Final    Globulin 02/03/2018 3.9  2.0 - 4.0 g/dL Final    A-G Ratio 02/03/2018 1.0  0.8 - 1.7   Final    Calcitriol (Vit D 1, 25 di-OH) 02/03/2018 55.9  19.9 - 79.3 pg/mL Final    Comment: (NOTE)  Performed At: [address] [id number]  Brian Cardoza MD [id number]      Vitamin B12 02/03/2018 456  211 - 911 pg/mL Final    Folate 02/03/2018 >20.0* 3.10 - 17.50 ng/mL Final   Office Visit on 01/22/2018   Component Date Value Ref Range Status    Color (UA POC) 01/22/2018 Yellow   Preliminary    Clarity (UA POC) 01/22/2018 Cloudy   Preliminary    Glucose (UA POC) 01/22/2018 Negative  Negative Preliminary    Bilirubin (UA POC) 01/22/2018 Negative  Negative Preliminary    Ketones (UA POC) 01/22/2018 Negative  Negative Preliminary    Specific gravity (UA POC) 01/22/2018 1.020  1.001 - 1.035 Preliminary    Blood (UA POC) 01/22/2018 Negative  Negative Preliminary    pH (UA POC) 01/22/2018 5.5  4.6 - 8.0 Preliminary    Protein (UA POC) 01/22/2018 Negative  Negative Preliminary    Urobilinogen (UA POC) 01/22/2018 0.2 mg/dL  0.2 - 1 Preliminary    Nitrites (UA POC) 01/22/2018 Negative  Negative Preliminary    Leukocyte esterase (UA POC) 01/22/2018 1+  Negative Preliminary          Follow-up Disposition:  Return if symptoms worsen or fail to improve.

## 2018-04-15 LAB
BACTERIA SPEC CULT: ABNORMAL
BACTERIA SPEC CULT: ABNORMAL
SERVICE CMNT-IMP: ABNORMAL

## 2018-04-23 ENCOUNTER — TELEPHONE (OUTPATIENT)
Dept: FAMILY MEDICINE CLINIC | Age: 38
End: 2018-04-23

## 2018-04-23 NOTE — TELEPHONE ENCOUNTER
----- Message from Ronda Wolff MD sent at 4/16/2018 10:11 AM EDT -----  Continue Cipro  Cul;zena  is sensitive to cipro

## 2018-05-25 ENCOUNTER — OFFICE VISIT (OUTPATIENT)
Dept: PULMONOLOGY | Facility: CLINIC | Age: 38
End: 2018-05-25

## 2018-05-25 VITALS
HEART RATE: 78 BPM | WEIGHT: 157 LBS | SYSTOLIC BLOOD PRESSURE: 107 MMHG | HEIGHT: 64 IN | TEMPERATURE: 98.8 F | OXYGEN SATURATION: 99 % | BODY MASS INDEX: 26.8 KG/M2 | DIASTOLIC BLOOD PRESSURE: 70 MMHG | RESPIRATION RATE: 18 BRPM

## 2018-05-25 DIAGNOSIS — R06.02 SOB (SHORTNESS OF BREATH): Primary | ICD-10-CM

## 2018-05-25 DIAGNOSIS — J45.20 MILD INTERMITTENT ASTHMA WITHOUT COMPLICATION: ICD-10-CM

## 2018-05-25 RX ORDER — TOPIRAMATE 100 MG/1
150 TABLET, FILM COATED ORAL 2 TIMES DAILY
COMMUNITY
End: 2018-07-28 | Stop reason: SDUPTHER

## 2018-05-25 NOTE — PROGRESS NOTES
Chief Complaint   Patient presents with    Shortness of Breath     patient is complaining that going up the stairs and exercising at the gym has become more difficult as she is routinely out of breath. she started noticing these issues about a year and half ago but over the last 6 months she has noticed an increase in her symptons. 1. Have you been to the ER, urgent care clinic since your last visit? Hospitalized since your last visit? No    2. Have you seen or consulted any other health care providers outside of the [facility] since your last visit? Include any pap smears or colon screening. No     Patient notes that both her children have asthma, and her mom now needs an inhaler but is not aware of the specific diagnosis.

## 2018-05-25 NOTE — PROGRESS NOTES
[facility]  Pulmonary, Critical Care, and Sleep Medicine      Pulmonary Office Initial Consultation    Name: Sarah Montiel     : 1980     Date: 2018        Subjective:   Patient has been referred for evaluation of: Asthma  Patient is a 42 y.o. female with no previous history of asthma. Over the past year and a half she has seen exercise-induced asthma-like symptoms that have worsened over the past 6 months. She is now noticing shortness of breath climbing up 1 flight of residential stairs. She can however, run 6-7 minutes at full speed on a treadmill. She has used albuterol previously with relief. She has not been on an inhaled steroid. She denies any wheezing. She will have an occasional nonproductive cough. This usually occurs with exertion. She denies orthopnea or PND. She has not been to an emergency room received a course of prednisone for respiratory problems. She is not missing any activities because of shortness of breath. She is simply becoming frustrated. The patient's mother and daughter both have asthma. Her son has exercise-induced asthma. Her daughter's asthma appears to be allergy driven. She has associated symptoms of pruritic skin rashes and postnasal drip. Her daughter is on an inhaled steroid. Both the patient and her daughter have had abnormal skin testing. Review of systems  She denies any neurologic deficits. She is taking Zyrtec daily because of atopic symptoms. Her symptoms are better but not completely controlled. She denies any pharyngitis or laryngitis. She has no palpable lymphadenopathy. She has some vague bilateral parasternal chest pain. She says it is a \"sharp aching\" pain. It does not appear to be worsened with exercise. She does not do push-ups or similar activities as part of her regular exercise routine. She denies any nausea, vomiting or abdominal pain. She has no lower extremity bothered by skin rashes. Review of systems was completed in its entirety and is otherwise normal.      Past Medical History:   Diagnosis Date    BV (bacterial vaginosis)     Chronic BV    Depression     Epile[facility] ([facility] [address] 2001    Dr. Xiang Arroyo H/O exercise stress test 11/26/2013    exercise nuc stress test wnl with EF 83%    Hematuria, microscopic 2015    Pelvic floor dysfunction     Recurrent UTI     Seizures (HCC)     Urinary frequency     UTI (urinary tract infection)      Past Surgical History:   Procedure Laterality Date    HX CHOLECYSTECTOMY  2005    HX TUBAL LIGATION  2008    LAP,CHOLECYSTECTOMY       Allergies   Allergen Reactions    Codeine Hives    Iodinated Contrast- Oral And Iv Dye Itching and Swelling    Iodine Unknown (comments)    Pecan Nut Other (comments)     Throat and ears itch    Prednisone Other (comments)    Shellfish Derived Swelling     Current Outpatient Prescriptions   Medication Sig    topiramate (TOPAMAX) 100 mg tablet Take 150 mg by mouth two (2) times a day.  B.infantis-B.ani-B.long-B.bifi (PROBIOTIC 4X) 10-15 mg TbEC Take  by mouth.  albuterol (PROVENTIL HFA, VENTOLIN HFA, PROAIR HFA) 90 mcg/actuation inhaler Take 2 Puffs by inhalation every four (4) hours as needed for Wheezing. No current facility-administered medications for this visit.       Social History     Social History    Marital status:      Spouse name: N/A    Number of children: N/A    Years of education: N/A     Social History Main Topics    Smoking status: Never Smoker    Smokeless tobacco: Never Used    Alcohol use No    Drug use: No    Sexual activity: Not Currently     Partners: Male     Birth control/ protection: Surgical     Other Topics Concern    None     Social History Narrative     Family History   Problem Relation Age of Onset    Thyroid Disease Mother     Alcohol abuse Father     Thyroid Disease Maternal Aunt     Thyroid Disease Maternal Grandmother     Cataract Paternal Grandmother      Objective:     Visit Vitals    /70 (BP 1 Location: Right arm, BP Patient Position: At rest)    Pulse 78    Temp 98.8 °F (37.1 °C) (Oral)    Resp 18    Ht 5' 4\" (1.626 m)    Wt 71.2 kg (157 lb)    SpO2 99%    BMI 26.95 kg/m2        Physical Exam:   General: Alert, oriented and in no respiratory distress at rest.  Affect normal.  HEENT: Sclera anicteric. Extraocular muscles are intact. Neck: Neck is supple. No jugular venous distention. No lymphadenopathy. CVS: S1S2 are normal.  No gallops. Regular rate and rhythm. No murmurs  RS: Normal AE bilaterally, no tactile fremitus or egophony, no accessory muscle use. No wheezes, rales or rhonchi. Chest wall excursion is normal.  Abd: soft, non tender  Neuro: Normal gait. Facial movements symmetric. Extrm: no leg edema, clubbing or cyanosis  Skin: no facial rash    Data review:     PFT today: FEV1 pre-bronchodilators is 2.68 L or 105% predicted. The FEV1/FVC ratio was normal at 83%. Total lung capacity is slightly reduced but still normal at 86%. Diffusion capacity is normal.  Flow volume loop is normal.    Imaging: No chest x-ray more recent than 2016, which was normal    Patient Active Problem List   Diagnosis Code    Seizure disorder ([facility] [address] G40.909    Palpitations R00.2    GARCIA (dyspnea on exertion) R06.09    CKD (chronic kidney disease) stage 2, GFR 60-89 ml/min N18.2    Recurrent UTI N39.0    Vitamin D deficiency E55.9    Bacterial vaginosis N76.0, B96.89    Trichomonal vaginitis A59.01    Routine health maintenance Z00.00    Chest pain R07.9    Urinary frequency R35.0    Loose skin L98.7    Allergic reaction T78.40XA    Abnormal uterine bleeding (AUB) N93.9     IMPRESSION:   · Asthma, mild persistent  · Allergic rhinitis      RECOMMENDATIONS:   · Continue Zyrtec  · As needed albuterol  · Therapeutic trial of nasal steroid. If symptoms remain uncontrolled, start inhaled corticosteroid.      Follow-up Disposition: As needed return to clinic      Health maintenance screens deferred to Primary care provider.      Kaden Hatch MD

## 2018-05-25 NOTE — PROGRESS NOTES
Verbal Order with read back per Dr. Isabell Novoa MD  For PFT smart panel. AMB POC PFT complete w/ bronchodilator  AMB POC PFT complete w/o bronchodilator  Gas Dilute/ wash out lung vol w/wo distrib vet & vol  Diffusing capacity    Dr. Isabell Novoa MD will co-sign the orders.

## 2018-05-25 NOTE — MR AVS SNAPSHOT
[address] [id number] 
[phone] Patient: Jessica Cabrera MRN: [id number] :1980 Visit Information Date & Time Provider Department Dept. Phone Encounter #  
 2018 11:00 AM MD Jessica Williamson [facility] [phone] Upcoming Health Maintenance Date Due Influenza Age 5 to Adult 2018 PAP AKA CERVICAL CYTOLOGY 3/14/2023 DTaP/Tdap/Td series (2 - Td) 2026 Allergies as of 2018  Review Complete On: 2018 By: Riky Andrade MD  
  
 Severity Noted Reaction Type Reactions Codeine  2013    Hives Iodinated Contrast- Oral And Iv Dye  2016    Itching, Swelling Iodine    Unknown (comments) Pecan Nut  03/15/2016    Other (comments) Throat and ears itch Prednisone  03/15/2016    Other (comments) Shellfish Derived  2016    Swelling Current Immunizations  Reviewed on 2016 Name Date Influenza Vaccine 10/1/2015, 2013 Influenza Vaccine (Quad) PF 2016 12:24 PM  
 Tdap 2016 12:25 PM  
  
 Not reviewed this visit You Were Diagnosed With   
  
 Codes Comments SOB (shortness of breath)    -  Primary ICD-10-CM: R06.02 
ICD-9-CM: 786.05 Vitals BP Pulse Temp Resp Height(growth percentile) Weight(growth percentile) 107/70 (BP 1 Location: Right arm, BP Patient Position: At rest) 78 98.8 °F (37.1 °C) (Oral) 18 5' 4\" (1.626 m) 157 lb (71.2 kg) SpO2 BMI OB Status Smoking Status 99% 26.95 kg/m2 Unknown Never Smoker BMI and BSA Data Body Mass Index Body Surface Area  
 26.95 kg/m 2 1.79 m 2 Preferred Pharmacy Pharmacy Name Phone [address], [address] [facility] [place] [address] [phone] Your Updated Medication List  
  
   
 This list is accurate as of 5/25/18 12:02 PM.  Always use your most recent med list.  
  
  
  
  
 albuterol 90 mcg/actuation inhaler Commonly known as:  PROVENTIL HFA, VENTOLIN HFA, PROAIR HFA Take 2 Puffs by inhalation every four (4) hours as needed for Wheezing. PROBIOTIC 4X 10-15 mg Tbec Generic drug:  B.infantis-B.ani-B.long-B.bifi Take  by mouth. TOPAMAX 100 mg tablet Generic drug:  topiramate Take 150 mg by mouth two (2) times a day. We Performed the Following AMB POC PFT COMPLETE W/O BRONCHODILATOR [32395 CPT(R)] DIFFUSING CAPACITY [id number] CPT(R)] GAS DILUT/WASHOUT LUNG VOL W/WO DISTRIB VENT&VOL [29842 CPT(R)] To-Do List   
 05/26/2018 Procedures: AMB POC PFT COMPLETE W/O BRONCHODILATOR   
  
 05/26/2018 Procedures:  DIFFUSING CAPACITY   
  
 05/26/2018 Procedures:  GAS DILUT/WASHOUT LUNG VOL W/WO DISTRIB VENT&VOL Introducing [facility]! Dear Leola Lama: 
Thank you for requesting a [employer] account. Our records indicate that you already have an active [employer] account. You can access your account anytime at [email or web address] Did you know that you can access your hospital and ER discharge instructions at any time in [employer]? You can also review all of your test results from your hospital stay or ER visit. Additional Information If you have questions, please visit the Frequently Asked Questions section of the [employer] website at [email or web address] Remember, [employer] is NOT to be used for urgent needs. For medical emergencies, dial 911. Now available from your iPhone and Android! Please provide this summary of care documentation to your next provider. Your primary care clinician is listed as [address]. If you have any questions after today's visit, please call [phone].

## 2018-06-21 NOTE — TELEPHONE ENCOUNTER
Pt calling to request med refill of:  Vitamin D/ bacteria infection can you call in don't want to come in for ov    Be sent to [facility] ([address])    Pt has 0 tabs remaining     Pts last appt was 4/13/18    Pt advised of 72 hour time frame. Please advise.

## 2018-06-22 ENCOUNTER — HOSPITAL ENCOUNTER (OUTPATIENT)
Dept: LAB | Age: 38
Discharge: HOME | End: 2018-06-22
Payer: COMMERCIAL

## 2018-06-22 ENCOUNTER — OFFICE VISIT (OUTPATIENT)
Dept: FAMILY MEDICINE CLINIC | Age: 38
End: 2018-06-22

## 2018-06-22 VITALS
WEIGHT: 157 LBS | DIASTOLIC BLOOD PRESSURE: 74 MMHG | BODY MASS INDEX: 26.8 KG/M2 | SYSTOLIC BLOOD PRESSURE: 110 MMHG | RESPIRATION RATE: 20 BRPM | HEART RATE: 78 BPM | HEIGHT: 64 IN | TEMPERATURE: 99 F | OXYGEN SATURATION: 95 %

## 2018-06-22 DIAGNOSIS — B96.89 BACTERIAL VAGINOSIS: ICD-10-CM

## 2018-06-22 DIAGNOSIS — B37.31 VAGINAL CANDIDIASIS: ICD-10-CM

## 2018-06-22 DIAGNOSIS — N76.0 BACTERIAL VAGINOSIS: ICD-10-CM

## 2018-06-22 DIAGNOSIS — E55.9 VITAMIN D DEFICIENCY: ICD-10-CM

## 2018-06-22 DIAGNOSIS — N89.8 VAGINAL DISCHARGE: Primary | ICD-10-CM

## 2018-06-22 DIAGNOSIS — N89.8 VAGINAL DISCHARGE: ICD-10-CM

## 2018-06-22 PROCEDURE — 87798 DETECT AGENT NOS DNA AMP: CPT | Performed by: LEGAL MEDICINE

## 2018-06-22 RX ORDER — ERGOCALCIFEROL 1.25 MG/1
50000 CAPSULE ORAL
Qty: 12 CAP | Refills: 2 | Status: SHIPPED | OUTPATIENT
Start: 2018-06-22 | End: 2018-09-20

## 2018-06-22 RX ORDER — ERGOCALCIFEROL 1.25 MG/1
50000 CAPSULE ORAL
Qty: 12 CAP | Refills: 2 | Status: SHIPPED | OUTPATIENT
Start: 2018-06-22 | End: 2018-06-22 | Stop reason: SDUPTHER

## 2018-06-22 NOTE — PROGRESS NOTES
Lady Webb     Chief Complaint   Patient presents with    Fatigue     x week      Vitals:    06/22/18 1459   BP: 110/74   Pulse: 78   Resp: 20   Temp: 99 °F (37.2 °C)   TempSrc: Oral   SpO2: 95%   Weight: 157 lb (71.2 kg)   Height: 5' 4\" (1.626 m)   PainSc:   0 - No pain   LMP: 05/30/2018         HPI: Patient is here complaining about order in her vagina area last couple of weeks, there is no much of the vaginal discharge, she has not used any medication yet, she does have a history of bacterial vaginosis in the past last time was treated with a 4-month ago. Patient also complaining about vaginal dryness, she has not been sexually active for a while and recently she got into sexual activity. She has been getting her regular. Monthly but was a few days today. Past Medical History:   Diagnosis Date    BV (bacterial vaginosis)     Chronic BV    Depression     Epilepsy ([facility] [address] 2001    Dr. Wendie Hernandez H/O exercise stress test 11/26/2013    exercise nuc stress test wnl with EF 83%    Hematuria, microscopic 2015    Pelvic floor dysfunction     Recurrent UTI     Seizures (HCC)     Urinary frequency     UTI (urinary tract infection)      Past Surgical History:   Procedure Laterality Date    HX CHOLECYSTECTOMY  2005    HX TUBAL LIGATION  2008    LAP,CHOLECYSTECTOMY       Social History   Substance Use Topics    Smoking status: Never Smoker    Smokeless tobacco: Never Used    Alcohol use No       Family History   Problem Relation Age of Onset    Thyroid Disease Mother     Alcohol abuse Father     Thyroid Disease Maternal Aunt     Thyroid Disease Maternal Grandmother     Cataract Paternal Grandmother        Review of Systems   Constitutional: Negative for chills, fever, malaise/fatigue and weight loss. HENT: Negative for congestion, ear discharge, ear pain, hearing loss, nosebleeds, sinus pain and sore throat. Eyes: Negative for blurred vision, double vision and discharge. Respiratory: Negative for cough, hemoptysis, sputum production and shortness of breath. Cardiovascular: Negative for chest pain, palpitations, claudication and leg swelling. Gastrointestinal: Negative for abdominal pain, constipation, diarrhea, nausea and vomiting. Genitourinary: Negative for dysuria, frequency and urgency. Skin: Negative for itching and rash. Neurological: Negative for dizziness, tingling, sensory change, speech change, focal weakness, weakness and headaches. Psychiatric/Behavioral: Negative for depression and suicidal ideas. Physical Exam   Constitutional: She is oriented to person, place, and time. She appears well-developed and well-nourished. No distress. HENT:   Head: Normocephalic and atraumatic. Mouth/Throat: Oropharynx is clear and moist. No oropharyngeal exudate. Eyes: Conjunctivae are normal. Pupils are equal, round, and reactive to light. Right eye exhibits no discharge. Left eye exhibits no discharge. No scleral icterus. Neck: Normal range of motion. Neck supple. No thyromegaly present. Cardiovascular: Normal rate, regular rhythm and normal heart sounds. No murmur heard. Pulmonary/Chest: Effort normal and breath sounds normal. No respiratory distress. She has no wheezes. She has no rales. She exhibits no tenderness. Abdominal: Soft. She exhibits no distension. There is no tenderness. There is no rebound. Genitourinary: Uterus normal. Vaginal discharge found. Genitourinary Comments: Minimal vagina discharge with no odor vulva and vagina looks normal and on examination is negative for tenderness   Musculoskeletal: Normal range of motion. She exhibits tenderness. She exhibits no edema or deformity. Lymphadenopathy:     She has no cervical adenopathy. Neurological: She is alert and oriented to person, place, and time. No cranial nerve deficit. Coordination normal.   Skin: Skin is warm and dry. No rash noted. She is not diaphoretic. No erythema. No pallor. Psychiatric: She has a normal mood and affect. Her behavior is normal. Judgment and thought content normal.        Assessment and plan     1. Vitamin D deficiency  Check vitamin D supplements refill was given    2. Vaginal discharge  No swab was obtained today  - ergocalciferol (ERGOCALCIFEROL) 50,000 unit capsule; Take 1 Cap by mouth every seven (7) days for 90 days. Dispense: 12 Cap; Refill: 2  - NUSWAB VAGINITIS PLUS; Future     Patient advised to avoid direct cleaning of the vagina area was intensive self-cleaning is a very mild been noted to soak with warm water and external cleaning    3. Bacterial vaginosis    - ergocalciferol (ERGOCALCIFEROL) 50,000 unit capsule; Take 1 Cap by mouth every seven (7) days for 90 days. Dispense: 12 Cap; Refill: 2  - NUSWAB VAGINITIS PLUS; Future    Current Outpatient Prescriptions   Medication Sig Dispense Refill    ergocalciferol (ERGOCALCIFEROL) 50,000 unit capsule Take 1 Cap by mouth every seven (7) days for 90 days. 12 Cap 2    topiramate (TOPAMAX) 100 mg tablet Take 150 mg by mouth two (2) times a day.  B.infantis-B.ani-B.long-B.bifi (PROBIOTIC 4X) 10-15 mg TbEC Take  by mouth.  albuterol (PROVENTIL HFA, VENTOLIN HFA, PROAIR HFA) 90 mcg/actuation inhaler Take 2 Puffs by inhalation every four (4) hours as needed for Wheezing.  1 Inhaler 2       Patient Active Problem List    Diagnosis Date Noted    Loose skin 07/27/2016    Allergic reaction 07/27/2016    Abnormal uterine bleeding (AUB) 07/27/2016    Urinary frequency 03/15/2016    Bacterial vaginosis 01/27/2016    Trichomonal vaginitis 01/27/2016    Routine health maintenance 01/27/2016    Vitamin D deficiency 01/25/2016    Recurrent UTI 01/22/2016    CKD (chronic kidney disease) stage 2, GFR 60-89 ml/min 05/13/2015    GARCIA (dyspnea on exertion) 03/03/2014    Seizure disorder ([address] 11/26/2013    Palpitations 11/26/2013    Chest pain 11/12/2013     Results for orders placed or performed during the hospital encounter of 04/13/18   CULTURE, URINE   Result Value Ref Range    Special Requests: NO SPECIAL REQUESTS      Culture result: [id number] COLONIES/mL KLEBSIELLA PNEUMONIAE (A)      Culture result:        [id number]  COLONIES/mL  MIXED GRAM POSITIVE MARC, PROBABLE SKIN/GENITAL CONTAMINATION. Susceptibility    Klebsiella pneumoniae - PENELOPE     Ampicillin ($) >=32 Resistant ug/mL     Ampicillin/sulbactam ($) 8 Susceptible ug/mL     Cefazolin ($) <=4 Susceptible ug/mL     Cefepime ($$) <=1 Susceptible ug/mL     Ceftazidime ($) <=1 Susceptible ug/mL     Ceftriaxone ($) <=1 Susceptible ug/mL     Ciprofloxacin ($) <=0.25 Susceptible ug/mL     Gentamicin ($) <=1 Susceptible ug/mL     Imipenem <=0.25 Susceptible ug/mL     Levofloxacin ($) <=0.12 Susceptible ug/mL     Nitrofurantoin 64 Intermediate ug/mL     Piperacillin/Tazobac ($) <=4 Susceptible ug/mL     Tobramycin ($) <=1 Susceptible ug/mL     Trimeth-Sulfamethoxa <=20 Susceptible ug/mL     Hospital Outpatient Visit on 04/13/2018   Component Date Value Ref Range Status    Special Requests: 04/13/2018 NO SPECIAL REQUESTS    Final    Culture result: 04/13/2018 [id number] COLONIES/mL KLEBSIELLA PNEUMONIAE*   Final    Culture result: 04/13/2018     Final                    Value:[id number]  COLONIES/mL  MIXED GRAM POSITIVE MARC, PROBABLE SKIN/GENITAL CONTAMINATION.      Office Visit on 04/13/2018   Component Date Value Ref Range Status    Color (UA POC) 04/13/2018 Napoleon   Final    Clarity (UA POC) 04/13/2018 Clear   Final    Glucose (UA POC) 04/13/2018 Negative  Negative Final    Bilirubin (UA POC) 04/13/2018 Negative  Negative Final    Ketones (UA POC) 04/13/2018 Negative  Negative Final    Specific gravity (UA POC) 04/13/2018 1.020  1.001 - 1.035 Final    Blood (UA POC) 04/13/2018 Negative  Negative Final    pH (UA POC) 04/13/2018 8.5* 4.6 - 8.0 Final    Protein (UA POC) 04/13/2018 2+  Negative Final    Urobilinogen (UA POC) 04/13/2018 1 mg/dL  0.2 - 1 Final    Nitrites (UA POC) 04/13/2018 Negative  Negative Final    Leukocyte esterase (UA POC) 04/13/2018 3+  Negative Final          Follow-up Disposition:  Return if symptoms worsen or fail to improve.

## 2018-06-22 NOTE — PROGRESS NOTES
Kathy Shetty is a 42 y.o. female (: 1980) presenting to address:    Chief Complaint   Patient presents with    Fatigue     x week        Vitals:    18 1459   BP: 110/74   Pulse: 78   Resp: 20   Temp: 99 °F (37.2 °C)   TempSrc: Oral   SpO2: 95%   Weight: 157 lb (71.2 kg)   Height: 5' 4\" (1.626 m)   PainSc:   0 - No pain   LMP: 2018       Hearing/Vision:   No exam data present    Learning Assessment:     Learning Assessment 2016   PRIMARY LEARNER Patient   HIGHEST LEVEL OF EDUCATION - PRIMARY LEARNER  4 YEARS OF COLLEGE   BARRIERS PRIMARY LEARNER NONE   CO-LEARNER CAREGIVER No   PRIMARY LANGUAGE ENGLISH   LEARNER PREFERENCE PRIMARY VIDEOS     LISTENING   ANSWERED BY self   RELATIONSHIP SELF     Depression Screening:     PHQ over the last two weeks 3/14/2018   Little interest or pleasure in doing things Not at all   Feeling down, depressed or hopeless Not at all   Total Score PHQ 2 0     Fall Risk Assessment:     Fall Risk Assessment, last 12 mths 2017   Able to walk? Yes   Fall in past 12 months? No     Abuse Screening:     Abuse Screening Questionnaire 3/14/2018   Do you ever feel afraid of your partner? N   Are you in a relationship with someone who physically or mentally threatens you? N   Is it safe for you to go home? Y     Coordination of Care Questionaire:   1. Have you been to the ER, urgent care clinic since your last visit? Hospitalized since your last visit? NO    2. Have you seen or consulted any other health care providers outside of the [facility] since your last visit? Include any pap smears or colon screening.  NO

## 2018-06-22 NOTE — TELEPHONE ENCOUNTER
Spoke with patient. Advised that she needs to come in to be swabbed for BV. She asked about ways to prevent recurring infections. Advised to avoid perfumed body washes and lotions in the vaginal area. Avoid sugary drinks and food. Continue probiotics. Be aware of the condoms used when having intercourse. Patient also advised to shower as soon as possible following exercise. Patient verbalized understanding. Advised that she needed to come in and discuss her concerns with DR. Miranda. Scheduled for 245pm. Closing encounter.

## 2018-06-30 ENCOUNTER — OFFICE VISIT (OUTPATIENT)
Dept: FAMILY MEDICINE CLINIC | Age: 38
End: 2018-06-30

## 2018-06-30 VITALS
WEIGHT: 155 LBS | HEART RATE: 78 BPM | DIASTOLIC BLOOD PRESSURE: 64 MMHG | SYSTOLIC BLOOD PRESSURE: 102 MMHG | OXYGEN SATURATION: 99 % | BODY MASS INDEX: 26.46 KG/M2 | HEIGHT: 64 IN | TEMPERATURE: 98.2 F | RESPIRATION RATE: 16 BRPM

## 2018-06-30 DIAGNOSIS — N39.0 RECURRENT UTI: Primary | ICD-10-CM

## 2018-06-30 LAB
BILIRUB UR QL STRIP: NEGATIVE
GLUCOSE UR-MCNC: NEGATIVE MG/DL
KETONES P FAST UR STRIP-MCNC: NEGATIVE MG/DL
PH UR STRIP: 5.5 [PH] (ref 4.6–8)
PROT UR QL STRIP: NEGATIVE
SP GR UR STRIP: 1.03 (ref 1–1.03)
UA UROBILINOGEN AMB POC: NORMAL (ref 0.2–1)
URINALYSIS CLARITY POC: CLEAR
URINALYSIS COLOR POC: YELLOW
URINE BLOOD POC: NEGATIVE
URINE LEUKOCYTES POC: NORMAL
URINE NITRITES POC: NEGATIVE

## 2018-06-30 RX ORDER — PHENAZOPYRIDINE HYDROCHLORIDE 100 MG/1
100 TABLET, FILM COATED ORAL
Qty: 9 TAB | Refills: 0 | Status: SHIPPED | OUTPATIENT
Start: 2018-06-30 | End: 2018-07-03

## 2018-06-30 RX ORDER — FLUCONAZOLE 150 MG/1
150 TABLET ORAL DAILY
Qty: 1 TAB | Refills: 0 | Status: SHIPPED | OUTPATIENT
Start: 2018-06-30 | End: 2018-07-01

## 2018-06-30 RX ORDER — SULFAMETHOXAZOLE AND TRIMETHOPRIM 800; 160 MG/1; MG/1
1 TABLET ORAL 2 TIMES DAILY
Qty: 20 TAB | Refills: 0 | Status: SHIPPED | OUTPATIENT
Start: 2018-06-30 | End: 2018-07-10

## 2018-06-30 NOTE — MR AVS SNAPSHOT
[address] [address] 
[phone] Patient: Lady Webb MRN: [id number] :1980 Visit Information Date & Time Provider Department Dept. Phone Encounter #  
 2018  2:00 PM Kunal Anne [phone] [id number] Upcoming Health Maintenance Date Due Pneumococcal 19-64 Medium Risk (1 of 1 - PPSV23) 1997 Influenza Age 5 to Adult 2018 PAP AKA CERVICAL CYTOLOGY 3/14/2023 DTaP/Tdap/Td series (2 - Td) 2026 Allergies as of 2018  Review Complete On: 2018 By: Valencia Bojorquez LPN Severity Noted Reaction Type Reactions Codeine  2013    Hives Iodinated Contrast- Oral And Iv Dye  2016    Itching, Swelling Iodine    Unknown (comments) Pecan Nut  03/15/2016    Other (comments) Throat and ears itch Prednisone  03/15/2016    Other (comments) Shellfish Derived  2016    Swelling Current Immunizations  Reviewed on 2016 Name Date Influenza Vaccine 10/1/2015, 2013 Influenza Vaccine (Quad) PF 2016 12:24 PM  
 Tdap 2016 12:25 PM  
  
 Not reviewed this visit You Were Diagnosed With   
  
 Codes Comments Recurrent UTI    -  Primary ICD-10-CM: N39.0 ICD-9-CM: 599.0 Vitals BP Pulse Temp Resp Height(growth percentile) Weight(growth percentile) 102/64 (BP 1 Location: Right arm, BP Patient Position: Sitting) 78 98.2 °F (36.8 °C) (Oral) 16 5' 4\" (1.626 m) 155 lb (70.3 kg) LMP SpO2 BMI OB Status Smoking Status 2018 (Exact Date) 99% 26.61 kg/m2 Unknown Never Smoker Vitals History BMI and BSA Data Body Mass Index Body Surface Area  
 26.61 kg/m 2 1.78 m 2 Preferred Pharmacy Pharmacy Name Phone  [address] [address] [facility]  [address] [phone] Your Updated Medication List  
  
   
This list is accurate as of 6/30/18  2:42 PM.  Always use your most recent med list.  
  
  
  
  
 ergocalciferol 50,000 unit capsule Commonly known as:  ERGOCALCIFEROL Take 1 Cap by mouth every seven (7) days for 90 days. fluconazole 150 mg tablet Commonly known as:  DIFLUCAN Take 1 Tab by mouth daily for 1 day. FDA advises cautious prescribing of oral fluconazole in pregnancy. phenazopyridine 100 mg tablet Commonly known as:  PYRIDIUM Take 1 Tab by mouth three (3) times daily as needed for Pain for up to 3 days. PROBIOTIC 4X 10-15 mg Tbec Generic drug:  B.infantis-B.ani-B.long-B.bifi Take  by mouth. TOPAMAX 100 mg tablet Generic drug:  topiramate Take 150 mg by mouth two (2) times a day. trimethoprim-sulfamethoxazole 160-800 mg per tablet Commonly known as:  BACTRIM DS, SEPTRA DS Take 1 Tab by mouth two (2) times a day for 10 days. Prescriptions Printed Refills  
 fluconazole (DIFLUCAN) 150 mg tablet 0 Sig: Take 1 Tab by mouth daily for 1 day. FDA advises cautious prescribing of oral fluconazole in pregnancy. Class: Print Route: Oral  
  
Prescriptions Sent to Pharmacy Refills  
 trimethoprim-sulfamethoxazole (BACTRIM DS, SEPTRA DS) 160-800 mg per tablet 0 Sig: Take 1 Tab by mouth two (2) times a day for 10 days. Class: Normal  
 Pharmacy: [facility] [address], \[facility]\"" [place] [place] [address] [facility] Ph #: [phone] Route: Oral  
 phenazopyridine (PYRIDIUM) 100 mg tablet 0 Sig: Take 1 Tab by mouth three (3) times daily as needed for Pain for up to 3 days. Class: Normal  
 Pharmacy: [facility] [address], \[facility]\"" [place] [address] [place] [address]  Ph #: [phone]  Route: Oral  
  
 We Performed the Following AMB POC URINALYSIS DIP STICK AUTO W/O MICRO [83712 CPT(R)] Introducing \[facility]\"" & [facility] SERVICES! Dear Jey Turner: 
Thank you for requesting a [employer] account. Our records indicate that you already have an active [employer] account. You can access your account anytime at [email or web address] Did you know that you can access your hospital and ER discharge instructions at any time in [employer]? You can also review all of your test results from your hospital stay or ER visit. Additional Information If you have questions, please visit the Frequently Asked Questions section of the [employer] website at [email or web address] Remember, [employer] is NOT to be used for urgent needs. For medical emergencies, dial 911. Now available from your iPhone and Android! Please provide this summary of care documentation to your next provider. Your primary care clinician is listed as Gretel Corado. If you have any questions after today's visit, please call [phone].

## 2018-06-30 NOTE — PROGRESS NOTES
HISTORY OF PRESENT ILLNESS  Jean-Paul Talley is a 42 y.o. female to [place] with 2-3 days of urinary urgency and frequency. She has slight discomfort at the end of urination. She usually drinks a fair amount of water but she is increase her water even more since yesterday as well as drinking cranberry juice. Yesterday she purchased Cystex and took it last night and this morning, which was held for the discomfort and slightly with urgency. Bladder Infection    The history is provided by the patient. This is a new problem. The current episode started 2 days ago. The problem occurs every urination. The patient is experiencing no pain. There has been no fever. She is sexually active. There is no history of pyelonephritis. Associated symptoms include frequency and urgency. Pertinent negatives include no chills, no sweats, no nausea, no hematuria, no flank pain, no abdominal pain and no back pain. The patient is not pregnant. She has tried increased fluids for the symptoms. The treatment provided mild relief. Her past medical history is significant for recurrent UTIs. Past Medical History:   Diagnosis Date    BV (bacterial vaginosis)     Chronic BV    Depression     Epilepsy ([facility] [address] 2001    Dr. Noreen Palomino H/O exercise stress test 11/26/2013    exercise nuc stress test wnl with EF 83%    Hematuria, microscopic 2015    Pelvic floor dysfunction     Recurrent UTI     Seizures (HCC)     Urinary frequency     UTI (urinary tract infection)      Current Outpatient Prescriptions on File Prior to Visit   Medication Sig Dispense Refill    ergocalciferol (ERGOCALCIFEROL) 50,000 unit capsule Take 1 Cap by mouth every seven (7) days for 90 days. 12 Cap 2    topiramate (TOPAMAX) 100 mg tablet Take 150 mg by mouth two (2) times a day.  B.infantis-B.ani-B.long-B.bifi (PROBIOTIC 4X) 10-15 mg TbEC Take  by mouth. No current facility-administered medications on file prior to visit.         Review of Systems Constitutional: Negative for chills, fever and malaise/fatigue. Gastrointestinal: Negative for abdominal pain and nausea. Genitourinary: Positive for dysuria, frequency and urgency. Negative for flank pain and hematuria. Musculoskeletal: Negative for back pain. Objective  Visit Vitals    /64 (BP 1 Location: Right arm, BP Patient Position: Sitting)    Pulse 78    Temp 98.2 °F (36.8 °C) (Oral)    Resp 16    Ht 5' 4\" (1.626 m)    Wt 155 lb (70.3 kg)    LMP 06/01/2018 (Exact Date)  Comment: M.D. aware of irregularity    SpO2 99%    BMI 26.61 kg/m2     Physical Exam   Constitutional: She appears well-developed and well-nourished. No distress. Cardiovascular: Normal rate, regular rhythm and normal heart sounds. No murmur heard. Pulmonary/Chest: Effort normal and breath sounds normal.   Abdominal: Soft. Bowel sounds are normal. She exhibits no distension. There is no tenderness. Genitourinary:   Genitourinary Comments: No CVA or flank tenderness       ASSESSMENT and PLAN    ICD-10-CM ICD-9-CM    1. Recurrent UTI N39.0 599.0 AMB POC URINALYSIS DIP STICK AUTO W/O MICRO     Urinalysis indicates possible mild infection. Patient was given Cipro the last 2 time she had a UTI since [address] makes her very nauseous. She has had Bactrim in the past but the last time she had it they got changed to Cipro because the urinary symptoms were not diminishing within a couple days. I explained to her that there is a concern of resistance to Cipro and she continues to use up for UTI and she does become resistant and it cannot be used if she ever had a kidney infection. Patient agreed to try the Bactrim. Continue with the water and cranberry juice. Questions answered patient verbalized understanding and agreed with plan.

## 2018-06-30 NOTE — PROGRESS NOTES
Jessica Cabrera is a 42 y.o. female (: 1980) presenting to address:    Chief Complaint   Patient presents with    Urinary Burning     seen 18 for BV    Urinary Frequency       Vitals:    18 1412   BP: 102/64   Pulse: 78   Resp: 16   Temp: 98.2 °F (36.8 °C)   TempSrc: Oral   SpO2: 99%   Weight: 155 lb (70.3 kg)   Height: 5' 4\" (1.626 m)   PainSc:   0 - No pain   LMP: 2018       Hearing/Vision:   No exam data present    Learning Assessment:     Learning Assessment 2016   PRIMARY LEARNER Patient   HIGHEST LEVEL OF EDUCATION - PRIMARY LEARNER  4 YEARS OF COLLEGE   BARRIERS PRIMARY LEARNER NONE   CO-LEARNER CAREGIVER No   PRIMARY LANGUAGE ENGLISH   LEARNER PREFERENCE PRIMARY VIDEOS     LISTENING   ANSWERED BY self   RELATIONSHIP SELF     Depression Screening:     PHQ over the last two weeks 2018   Little interest or pleasure in doing things Not at all   Feeling down, depressed or hopeless Not at all   Total Score PHQ 2 0     Fall Risk Assessment:     Fall Risk Assessment, last 12 mths 2017   Able to walk? Yes   Fall in past 12 months? No     Abuse Screening:     Abuse Screening Questionnaire 3/14/2018   Do you ever feel afraid of your partner? N   Are you in a relationship with someone who physically or mentally threatens you? N   Is it safe for you to go home? Y     Coordination of Care Questionaire:   1. Have you been to the ER, urgent care clinic since your last visit? Hospitalized since your last visit? no    2. Have you seen or consulted any other health care providers outside of the [address] [place] [address] since your last visit? Include any pap smears or colon screening. no    Advanced Directive:   1. Do you have an Advanced Directive?no  2. Would you like information on Advanced Directives?  no

## 2018-07-03 RX ORDER — FLUCONAZOLE 150 MG/1
150 TABLET ORAL DAILY
Qty: 1 TAB | Refills: 0 | Status: SHIPPED | OUTPATIENT
Start: 2018-07-03 | End: 2018-07-04

## 2018-07-05 ENCOUNTER — TELEPHONE (OUTPATIENT)
Dept: FAMILY MEDICINE CLINIC | Age: 38
End: 2018-07-05

## 2018-07-05 NOTE — TELEPHONE ENCOUNTER
----- Message from Teo Cheng MD sent at 7/3/2018 11:19 AM EDT -----  Swab culture is negative except for yeast infection Diflucan 150 will be sent to the pharmacy.

## 2018-07-16 ENCOUNTER — TELEPHONE (OUTPATIENT)
Dept: FAMILY MEDICINE CLINIC | Age: 38
End: 2018-07-16

## 2018-07-16 NOTE — TELEPHONE ENCOUNTER
Pt calling b/c she had an appt madan for 7/16 b/c she hadnt had her cycle since 5/30 but it started on 7/15. She wants to know if she still needs to be seen. Pt aware of 72 hour wait time.

## 2018-07-17 NOTE — TELEPHONE ENCOUNTER
Patient has cancelled appt. Called to f/u with patient to ensure that she is not having any additional symptoms. LVM. Need to advise patient to monitor characteristics of her cycle and report any extreme abnormalities. Progress Note   Intensive Care Unit      SUBJECTIVE:     Infant is a 10 days  Boy Chrissy Wells born at 36w0d  gestation born via C/section for Preeclampsia, twin gestation, and repeat C/section, now 37 3/7 weeks adjusted age, comfortable in crib on monitors gaining weight (above birth weight by 33 grams) with feeding adaptation, bradycardia challenges.    BRADYCARDIA:  Bradycardic x 2 last 24 hrs with HR to 75 and 74, no apnea documented, SpO2 68-86 %, required stimulation x 1, noted to be after feeds    Feeding: Neosure 22 flavia/EBM 45 ml every 3 hrs alt nip/gav taking 5 to 45 ml by nipple last 24 hrs = 360 ml = 148 ml/kg.  All formula with no EBM documented last 24 hrs  Infant is voiding x 12 and stooling x 8.    OBJECTIVE:     Vital Signs (Most Recent)  Temp: 98.4 °F (36.9 °C) (18 1700)  Pulse: 173 (18 1700)  Resp: 41 (18 170)  BP: (!) 79/34 (18 0852)  BP Location: Left leg (18)  SpO2: (!) 100 % (18 170)      Intake/Output Summary (Last 24 hours) at 2018 1751  Last data filed at 2018 1700  Gross per 24 hour   Intake 360 ml   Output --   Net 360 ml       Most Recent Weight: 2443 g (5 lb 6.2 oz) (18 0800)  Percent Weight Change Since Birth: 1.4     Physical Exam:   General Appearance:  Healthy-appearing, quiet male infant, no dysmorphic features  Head:  Normocephalic, atraumatic, anterior fontanelle open soft and flat, sutures overlapping  Eyes:  PERRL, red reflex present bilaterally, anicteric sclera, no discharge  Ears:  Well-positioned, well-formed pinnae                             Nose:  nares patent, no rhinorrhea, NG tube left nare secure with skin pink and intact  Throat:  oropharynx clear, non-erythematous, mucous membranes moist, palate intact  Neck:  Supple, symmetrical, no torticollis  Chest:  Lungs clear to auscultation, respirations unlabored   Heart:  Regular rate & rhythm, normal S1/S2, no murmurs, rubs, or gallops                      Abdomen:  positive bowel sounds, soft, non-tender, non-distended, no masses, umbilical stump clean, drying  Pulses:  Strong equal femoral and brachial pulses, brisk capillary refill  Hips:  Negative Palma & Ortolani, gluteal creases equal  :  Normal Anastacio I male genitalia, anus patent, testes descending, uncircumcised  Musculosketal: no jaren or dimples, no scoliosis or masses, clavicles intact  Extremities:  Well-perfused, warm and dry, no cyanosis  Skin: pink, intact, sl mottled, minimal diaper dermatitis anal area  Neuro:  good cry, good symmetric tone and strength; positive naomi, root and suck    Labs:  No results found for this or any previous visit (from the past 24 hour(s)).    ASSESSMENT/PLAN:     36w0d  , doing well with fair nippling effort, occasional bradycardia without apnea    Patient Active Problem List    Diagnosis Date Noted    Diaper dermatitis 2018    Bradycardia in  2018    Liveborn infant, born in hospital, delivered by  2018      infant of 36 completed weeks of gestation 2018    Twin pregnancy, delivered by  section, current hospitalization 2018     PLAN:  Continue same               Encourage nippling               Follow clinically    Infant discussed with Dr. Luis Castillo, NNP

## 2018-07-17 NOTE — TELEPHONE ENCOUNTER
Spoke with patient. Discussed below information with her and advised when she should call us to schedule. Patient verbalized understanding.

## 2018-07-27 DIAGNOSIS — G40.909 SEIZURE DISORDER (HCC): Primary | Chronic | ICD-10-CM

## 2018-07-27 RX ORDER — TOPIRAMATE 100 MG/1
150 TABLET, FILM COATED ORAL 2 TIMES DAILY
Qty: 120 TAB | Refills: 0 | OUTPATIENT
Start: 2018-07-27 | End: 2018-08-26

## 2018-07-27 NOTE — TELEPHONE ENCOUNTER
Pt calling to request medication refill of:  Requested Prescriptions     Pending Prescriptions Disp Refills    topiramate (TOPAMAX) 100 mg tablet       Sig: Take 2 Tabs by mouth two (2) times a day. [facility]  Pt has about none remaining. Pts last appt was 6/30   Advised pt of 72 hour time frame for refill requests. Please advise.

## 2018-07-28 ENCOUNTER — OFFICE VISIT (OUTPATIENT)
Dept: FAMILY MEDICINE CLINIC | Age: 38
End: 2018-07-28

## 2018-07-28 VITALS
RESPIRATION RATE: 14 BRPM | HEART RATE: 64 BPM | TEMPERATURE: 97.2 F | BODY MASS INDEX: 26.15 KG/M2 | DIASTOLIC BLOOD PRESSURE: 66 MMHG | OXYGEN SATURATION: 99 % | SYSTOLIC BLOOD PRESSURE: 100 MMHG | WEIGHT: 153.2 LBS | HEIGHT: 64 IN

## 2018-07-28 DIAGNOSIS — N39.0 RECURRENT UTI: Primary | ICD-10-CM

## 2018-07-28 LAB
BILIRUB UR QL STRIP: NEGATIVE
GLUCOSE UR-MCNC: NEGATIVE MG/DL
KETONES P FAST UR STRIP-MCNC: NORMAL MG/DL
PH UR STRIP: 5 [PH] (ref 4.6–8)
PROT UR QL STRIP: NORMAL
SP GR UR STRIP: 1.03 (ref 1–1.03)
UA UROBILINOGEN AMB POC: NORMAL (ref 0.2–1)
URINALYSIS CLARITY POC: CLEAR
URINALYSIS COLOR POC: NORMAL
URINE BLOOD POC: NORMAL
URINE LEUKOCYTES POC: NORMAL
URINE NITRITES POC: POSITIVE

## 2018-07-28 RX ORDER — PHENAZOPYRIDINE HYDROCHLORIDE 100 MG/1
200 TABLET, FILM COATED ORAL
Qty: 9 TAB | Refills: 0 | Status: SHIPPED | OUTPATIENT
Start: 2018-07-28 | End: 2018-07-31

## 2018-07-28 RX ORDER — SULFAMETHOXAZOLE AND TRIMETHOPRIM 800; 160 MG/1; MG/1
1 TABLET ORAL 2 TIMES DAILY
Qty: 28 TAB | Refills: 0 | Status: SHIPPED | OUTPATIENT
Start: 2018-07-28 | End: 2018-08-11

## 2018-07-28 RX ORDER — FLUCONAZOLE 150 MG/1
150 TABLET ORAL DAILY
Qty: 1 TAB | Refills: 0 | Status: SHIPPED | OUTPATIENT
Start: 2018-07-28 | End: 2019-12-29 | Stop reason: SDUPTHER

## 2018-07-28 RX ORDER — TOPIRAMATE 100 MG/1
150 TABLET, FILM COATED ORAL 2 TIMES DAILY
Qty: 60 TAB | Refills: 5 | Status: SHIPPED | OUTPATIENT
Start: 2018-07-28 | End: 2019-08-06 | Stop reason: SDUPTHER

## 2018-07-28 NOTE — PROGRESS NOTES
HISTORY OF PRESENT ILLNESS  Eric Enriquez is a 42 y.o. female resented to center with approximately 3 consecutive days of urinary urgency frequency and feeling of bladder pressure. Mild discomfort with urination but denies burning. She had a urinary tract infection about a month ago. She has a history of recurrent UTIs. She did go to urology about 2 years ago to get evaluated and she remembers them doing an ultrasound and lab work but does not remember getting evaluated any further. Urinary Frequency    The history is provided by the patient. This is a new problem. The current episode started 2 days ago. The problem occurs every urination. The problem has not changed since onset. There has been no fever. Associated symptoms include frequency and urgency. Pertinent negatives include no chills, no nausea, no abdominal pain and no back pain. The patient is not pregnant. Past Medical History:   Diagnosis Date    BV (bacterial vaginosis)     Chronic BV    Depression     Epilepsy ([facility] [address] 2001    Dr. Valentin Barber H/O exercise stress test 11/26/2013    exercise nuc stress test wnl with EF 83%    Hematuria, microscopic 2015    Pelvic floor dysfunction     Recurrent UTI     Seizures (HCC)     Urinary frequency     UTI (urinary tract infection)      Current Outpatient Prescriptions on File Prior to Visit   Medication Sig Dispense Refill    ergocalciferol (ERGOCALCIFEROL) 50,000 unit capsule Take 1 Cap by mouth every seven (7) days for 90 days. 12 Cap 2    B.infantis-B.ani-B.long-B.bifi (PROBIOTIC 4X) 10-15 mg TbEC Take  by mouth. No current facility-administered medications on file prior to visit.       Allergies   Allergen Reactions    Codeine Hives    Iodinated Contrast- Oral And Iv Dye Itching and Swelling    Iodine Unknown (comments)    Pecan Nut Other (comments)     Throat and ears itch    Prednisone Other (comments)    Shellfish Derived Swelling       Review of Systems Constitutional: Negative for chills, fever and malaise/fatigue. Gastrointestinal: Negative for abdominal pain and nausea. Genitourinary: Positive for frequency and urgency. Negative for dysuria. Musculoskeletal: Negative for back pain. Objective  Visit Vitals    /66 (BP 1 Location: Right arm, BP Patient Position: Sitting)    Pulse 64    Temp 97.2 °F (36.2 °C) (Oral)    Resp 14    Ht 5' 4\" (1.626 m)    Wt 153 lb 3.2 oz (69.5 kg)    LMP 07/15/2018 (Exact Date)  Comment: not normal for patient    SpO2 99%    BMI 26.3 kg/m2     Physical Exam   Constitutional: She appears well-nourished. No distress. Cardiovascular: Normal rate and regular rhythm. Pulmonary/Chest: Effort normal and breath sounds normal.   Genitourinary:   Genitourinary Comments: No CVA or flank tenderness       ASSESSMENT and PLAN    ICD-10-CM ICD-9-CM    1. Recurrent UTI N39.0 599.0 AMB POC URINALYSIS DIP STICK AUTO W/O MICRO      REFERRAL TO UROLOGY     UA shows infection. Treated with an antibiotic and instructed to make sure she is drinking adequate amount of water and avoid alcohol and caffeine. Referral given to urology for further evaluation. Questions answered and patient verbalized understanding and agreed with plan.

## 2018-07-28 NOTE — PROGRESS NOTES
Gabby Van is a 42 y.o. female (: 1980) presenting to address:    Chief Complaint   Patient presents with    Urinary Frequency       Vitals:    18 1410   BP: 100/66   Pulse: 64   Resp: 14   Temp: 97.2 °F (36.2 °C)   TempSrc: Oral   SpO2: 99%   Weight: 153 lb 3.2 oz (69.5 kg)   Height: 5' 4\" (1.626 m)   PainSc:   0 - No pain   LMP: 07/15/2018       Hearing/Vision:   No exam data present    Learning Assessment:     Learning Assessment 2016   PRIMARY LEARNER Patient   HIGHEST LEVEL OF EDUCATION - PRIMARY LEARNER  4 YEARS OF COLLEGE   BARRIERS PRIMARY LEARNER NONE   CO-LEARNER CAREGIVER No   PRIMARY LANGUAGE ENGLISH   LEARNER PREFERENCE PRIMARY VIDEOS     LISTENING   ANSWERED BY self   RELATIONSHIP SELF     Depression Screening:     PHQ over the last two weeks 2018   Little interest or pleasure in doing things Not at all   Feeling down, depressed, irritable, or hopeless Not at all   Total Score PHQ 2 0     Fall Risk Assessment:     Fall Risk Assessment, last 12 mths 2017   Able to walk? Yes   Fall in past 12 months? No     Abuse Screening:     Abuse Screening Questionnaire 3/14/2018   Do you ever feel afraid of your partner? N   Are you in a relationship with someone who physically or mentally threatens you? N   Is it safe for you to go home? Y     Coordination of Care Questionaire:   1. Have you been to the ER, urgent care clinic since your last visit? Hospitalized since your last visit? No    2. Have you seen or consulted any other health care providers outside of the [address] [place] [address] since your last visit? Include any pap smears or colon screening. As in EMR / Care everywhere    Advanced Directive:   1. Do you have an Advanced Directive? No  2. Would you like information on Advanced Directives?  No

## 2018-07-28 NOTE — MR AVS SNAPSHOT
[address] [address] [address] [facility] [address] 
[phone] Patient: Kerry Gilmore MRN: [id number] :1980 Visit Information Date & Time Provider Department Dept. Phone Encounter #  
 2018  2:00 PM [place] [phone] [id number] Upcoming Health Maintenance Date Due Pneumococcal 19-64 Medium Risk (1 of 1 - PPSV23) 1997 Influenza Age 5 to Adult 2018 PAP AKA CERVICAL CYTOLOGY 3/14/2023 DTaP/Tdap/Td series (2 - Td) 2026 Allergies as of 2018  Review Complete On: 2018 By: Miguel Seo Severity Noted Reaction Type Reactions Codeine  2013    Hives Iodinated Contrast- Oral And Iv Dye  2016    Itching, Swelling Iodine    Unknown (comments) Pecan Nut  03/15/2016    Other (comments) Throat and ears itch Prednisone  03/15/2016    Other (comments) Shellfish Derived  2016    Swelling Current Immunizations  Reviewed on 2016 Name Date Influenza Vaccine 10/1/2015, 2013 Influenza Vaccine (Quad) PF 2016 12:24 PM  
 Tdap 2016 12:25 PM  
  
 Not reviewed this visit You Were Diagnosed With   
  
 Codes Comments Recurrent UTI    -  Primary ICD-10-CM: N39.0 ICD-9-CM: 599.0 Vitals BP Pulse Temp Resp Height(growth percentile) Weight(growth percentile) 100/66 (BP 1 Location: Right arm, BP Patient Position: Sitting) 64 97.2 °F (36.2 °C) (Oral) 14 5' 4\" (1.626 m) 153 lb 3.2 oz (69.5 kg) LMP SpO2 BMI OB Status Smoking Status 07/15/2018 (Exact Date) 99% 26.3 kg/m2 Unknown Never Smoker Vitals History BMI and BSA Data Body Mass Index Body Surface Area  
 26.3 kg/m 2 1.77 m 2 Preferred Pharmacy Pharmacy Name Phone  [address] [facility] [address] AT [place] [address] [phone] Your Updated Medication List  
  
   
This list is accurate as of 7/28/18  2:32 PM.  Always use your most recent med list.  
  
  
  
  
 ergocalciferol 50,000 unit capsule Commonly known as:  ERGOCALCIFEROL Take 1 Cap by mouth every seven (7) days for 90 days. fluconazole 150 mg tablet Commonly known as:  DIFLUCAN Take 1 Tab by mouth daily for 1 day. FDA advises cautious prescribing of oral fluconazole in pregnancy. phenazopyridine 100 mg tablet Commonly known as:  PYRIDIUM Take 2 Tabs by mouth three (3) times daily as needed for Pain for up to 3 days. PROBIOTIC 4X 10-15 mg Tbec Generic drug:  B.infantis-B.ani-B.long-B.bifi Take  by mouth. topiramate 100 mg tablet Commonly known as:  TOPAMAX Take 2 Tabs by mouth two (2) times a day. trimethoprim-sulfamethoxazole 160-800 mg per tablet Commonly known as:  BACTRIM DS, SEPTRA DS Take 1 Tab by mouth two (2) times a day for 14 days. Prescriptions Sent to Pharmacy Refills  
 topiramate (TOPAMAX) 100 mg tablet 5 Sig: Take 2 Tabs by mouth two (2) times a day. Class: Normal  
 Pharmacy: [facility] [place] [address] Ph #: [phone] Route: Oral  
 trimethoprim-sulfamethoxazole (BACTRIM DS, SEPTRA DS) 160-800 mg per tablet 0 Sig: Take 1 Tab by mouth two (2) times a day for 14 days. Class: Normal  
 Pharmacy: [facility] [place] [address] Ph #: [phone] Route: Oral  
 phenazopyridine (PYRIDIUM) 100 mg tablet 0 Sig: Take 2 Tabs by mouth three (3) times daily as needed for Pain for up to 3 days. Class: Normal  
 Pharmacy: [facility] [place] [address] Ph #: [phone]  Route: Oral  
 fluconazole (DIFLUCAN) 150 mg tablet 0 Sig: Take 1 Tab by mouth daily for 1 day. FDA advises cautious prescribing of oral fluconazole in pregnancy. Class: Normal  
 Pharmacy: [facility] [address] #: [phone] Route: Oral  
  
We Performed the Following AMB POC URINALYSIS DIP STICK AUTO W/O MICRO [18583 CPT(R)] Introducing [address]! Dear Gerry Esters: 
Thank you for requesting a Sk[employer]canner account. Our records indicate that you already have an active Skyscanner account. You can access your account anytime at [email or web address] Did you know that you can access your hospital and ER discharge instructions at any time in Sk[employer]canner? You can also review all of your test results from your hospital stay or ER visit. Additional Information If you have questions, please visit the Frequently Asked Questions section of the Skyscanner website at [email or web address] Remember, Skyscanner is NOT to be used for urgent needs. For medical emergencies, dial 911. Now available from your iPhone and Android! Please provide this summary of care documentation to your next provider. Your primary care clinician is listed as Gregoria Garcia. If you have any questions after today's visit, please call [phone].

## 2018-08-02 ENCOUNTER — TELEPHONE (OUTPATIENT)
Dept: FAMILY MEDICINE CLINIC | Age: 38
End: 2018-08-02

## 2018-08-02 NOTE — TELEPHONE ENCOUNTER
Josie'd phone call from patient regarding generic bactrim prescription: causing side effects/request medication change. Please contact her back via telephone.

## 2018-08-03 RX ORDER — CIPROFLOXACIN 250 MG/1
250 TABLET, FILM COATED ORAL EVERY 12 HOURS
Qty: 10 TAB | Refills: 0 | Status: SHIPPED | OUTPATIENT
Start: 2018-08-03 | End: 2018-08-08

## 2018-08-03 NOTE — TELEPHONE ENCOUNTER
Patient was informed to stop taking the bactrim, MATIAS Nascimento will send over a new prescription of cipro to the pharmacy.  Patient showed verbal understanding, no further questions or concern at this time

## 2018-10-12 RX ORDER — FLUCONAZOLE 150 MG/1
150 TABLET ORAL DAILY
Qty: 1 TAB | Refills: 0 | Status: CANCELLED | OUTPATIENT
Start: 2018-10-12 | End: 2018-10-13

## 2018-10-12 NOTE — TELEPHONE ENCOUNTER
Called pt to verify the reason for the refill request. Pt was last seen in the office in August 2018.

## 2018-10-16 ENCOUNTER — TELEPHONE (OUTPATIENT)
Dept: FAMILY MEDICINE CLINIC | Age: 38
End: 2018-10-16

## 2018-10-16 DIAGNOSIS — E53.9 VITAMIN B DEFICIENCY, UNSPECIFIED: Primary | ICD-10-CM

## 2018-10-16 NOTE — TELEPHONE ENCOUNTER
Patient calling in to state that she feels like her B12 is low and needs an injection. She states she is falling asleep at work and is concerned. She also states that she is experiencing eyelid twitching. She usually gets her injections from the neurologists office, but they have not called her back. Spoke with Lucy Siddiqi PA-C. Patient will need to come in to have B12 drawn. She will come to the office tomorrow, but will begin 1000mcg B12 drops in the mean time. She has been advised that the decision will be made to provide her the injections once the lab results are in.

## 2018-10-17 ENCOUNTER — HOSPITAL ENCOUNTER (OUTPATIENT)
Dept: LAB | Age: 38
Discharge: HOME | End: 2018-10-17
Payer: COMMERCIAL

## 2018-10-17 LAB — VIT B12 SERPL-MCNC: 465 PG/ML (ref 211–911)

## 2018-10-17 PROCEDURE — 36415 COLL VENOUS BLD VENIPUNCTURE: CPT | Performed by: PHYSICIAN ASSISTANT

## 2018-10-17 PROCEDURE — 82607 VITAMIN B-12: CPT | Performed by: PHYSICIAN ASSISTANT

## 2019-05-28 ENCOUNTER — OFFICE VISIT (OUTPATIENT)
Dept: FAMILY MEDICINE CLINIC | Age: 39
End: 2019-05-28

## 2019-05-28 VITALS
TEMPERATURE: 98.4 F | HEIGHT: 64 IN | WEIGHT: 158.6 LBS | DIASTOLIC BLOOD PRESSURE: 68 MMHG | HEART RATE: 105 BPM | BODY MASS INDEX: 27.08 KG/M2 | RESPIRATION RATE: 18 BRPM | SYSTOLIC BLOOD PRESSURE: 110 MMHG | OXYGEN SATURATION: 98 %

## 2019-05-28 DIAGNOSIS — R05.9 COUGH: ICD-10-CM

## 2019-05-28 DIAGNOSIS — J06.9 UPPER RESPIRATORY TRACT INFECTION, UNSPECIFIED TYPE: Primary | ICD-10-CM

## 2019-05-28 RX ORDER — BENZONATATE 100 MG/1
100 CAPSULE ORAL
Qty: 30 CAP | Refills: 0 | Status: SHIPPED | OUTPATIENT
Start: 2019-05-28 | End: 2019-06-04

## 2019-05-28 NOTE — PROGRESS NOTES
Subjective:   Thayer Babinski is a 34 y.o. female who complains of congestion, post nasal drip and dry cough for 4 days, stable since that time. She denies a history of shortness of breath and wheezing. Evaluation to date: none. Treatment to date: none. Patient does not smoke cigarettes. Relevant PMH: No pertinent additional PMH. Current Outpatient Medications   Medication Sig Dispense Refill    benzonatate (TESSALON) 100 mg capsule Take 1 Cap by mouth three (3) times daily as needed for Cough for up to 7 days. 30 Cap 0    topiramate (TOPAMAX) 100 mg tablet Take 2 Tabs by mouth two (2) times a day. 60 Tab 5    B.infantis-B.ani-B.long-B.bifi (PROBIOTIC 4X) 10-15 mg TbEC Take  by mouth. Allergies   Allergen Reactions    Codeine Hives    Iodinated Contrast- Oral And Iv Dye Itching and Swelling    Iodine Unknown (comments)    Pecan Nut Other (comments)     Throat and ears itch    Prednisone Other (comments)    Shellfish Derived Swelling        Review of Systems  Pertinent items are noted in HPI. Objective:     Visit Vitals  /68 (BP 1 Location: Right arm, BP Patient Position: Sitting)   Pulse (!) 105   Temp 98.4 °F (36.9 °C) (Oral)   Resp 18   Ht 5' 4\" (1.626 m)   Wt 158 lb 9.6 oz (71.9 kg)   SpO2 98%   BMI 27.22 kg/m²     General:  alert, cooperative, no distress   Eyes: negative   Ears: normal TM's and external ear canals AU   Sinuses:  mild tenderness over bilateral maxillary   Mouth:  Lips, mucosa, and tongue normal. Teeth and gums normal   Neck: supple, symmetrical, trachea midline and no adenopathy. Heart: S1 and S2 normal, no murmurs noted. Lungs: clear to auscultation bilaterally   Abdomen: soft, non-tender. Bowel sounds normal. No masses,  no organomegaly        Assessment/Plan:   viral upper respiratory illness  Suggested symptomatic OTC remedies. RTC prn. Discussed diagnosis and treatment of viral URIs.   Discussed the importance of avoiding unnecessary antibiotic therapy. Orders Placed This Encounter    benzonatate (TESSALON) 100 mg capsule   .

## 2019-05-28 NOTE — PATIENT INSTRUCTIONS
Cough: Care Instructions  Your Care Instructions    A cough is your body's response to something that bothers your throat or airways. Many things can cause a cough. You might cough because of a cold or the flu, bronchitis, or asthma. Smoking, postnasal drip, allergies, and stomach acid that backs up into your throat also can cause coughs. A cough is a symptom, not a disease. Most coughs stop when the cause, such as a cold, goes away. You can take a few steps at home to cough less and feel better. Follow-up care is a key part of your treatment and safety. Be sure to make and go to all appointments, and call your doctor if you are having problems. It's also a good idea to know your test results and keep a list of the medicines you take. How can you care for yourself at home? · Drink lots of water and other fluids. This helps thin the mucus and soothes a dry or sore throat. Honey or lemon juice in hot water or tea may ease a dry cough. · Take cough medicine as directed by your doctor. · Prop up your head on pillows to help you breathe and ease a dry cough. · Try cough drops to soothe a dry or sore throat. Cough drops don't stop a cough. Medicine-flavored cough drops are no better than candy-flavored drops or hard candy. · Do not smoke. Avoid secondhand smoke. If you need help quitting, talk to your doctor about stop-smoking programs and medicines. These can increase your chances of quitting for good. When should you call for help? Call 911 anytime you think you may need emergency care.  For example, call if:    · You have severe trouble breathing.    Call your doctor now or seek immediate medical care if:    · You cough up blood.     · You have new or worse trouble breathing.     · You have a new or higher fever.     · You have a new rash.    Watch closely for changes in your health, and be sure to contact your doctor if:    · You cough more deeply or more often, especially if you notice more mucus or a change in the color of your mucus.     · You have new symptoms, such as a sore throat, an earache, or sinus pain.     · You do not get better as expected. Where can you learn more? Go to http://salome-teresa.info/. Enter D279 in the search box to learn more about \"Cough: Care Instructions. \"  Current as of: September 5, 2018  Content Version: 11.9  © 7933-9840 [employer]. Care instructions adapted under license by [employer] (which disclaims liability or warranty for this information). If you have questions about a medical condition or this instruction, always ask your healthcare professional. [address] any warranty or liability for your use of this information.

## 2019-05-28 NOTE — PROGRESS NOTES
Soila Thorpe is a 34 y.o. female (: 1980) presenting to address:    Chief Complaint   Patient presents with    Cough     since Friday       Vitals:    19 1159   BP: 110/68   Pulse: (!) 105   Resp: 18   Temp: 98.4 °F (36.9 °C)   TempSrc: Oral   SpO2: 98%   Weight: 158 lb 9.6 oz (71.9 kg)   Height: 5' 4\" (1.626 m)   PainSc:   0 - No pain       Hearing/Vision:   No exam data present    Learning Assessment:     Learning Assessment 2016   PRIMARY LEARNER Patient   HIGHEST LEVEL OF EDUCATION - PRIMARY LEARNER  4 YEARS OF COLLEGE   BARRIERS PRIMARY LEARNER NONE   CO-LEARNER CAREGIVER No   PRIMARY LANGUAGE ENGLISH   LEARNER PREFERENCE PRIMARY VIDEOS     LISTENING   ANSWERED BY self   RELATIONSHIP SELF     Depression Screening:     3 most recent PHQ Screens 2019   Little interest or pleasure in doing things Not at all   Feeling down, depressed, irritable, or hopeless Not at all   Total Score PHQ 2 0     Fall Risk Assessment:     Fall Risk Assessment, last 12 mths 2017   Able to walk? Yes   Fall in past 12 months? No     Abuse Screening:     Abuse Screening Questionnaire 3/14/2018   Do you ever feel afraid of your partner? N   Are you in a relationship with someone who physically or mentally threatens you? N   Is it safe for you to go home? Y     Coordination of Care Questionaire:   1. Have you been to the ER, urgent care clinic since your last visit? Hospitalized since your last visit? NO    2. Have you seen or consulted any other health care providers outside of the [address] [place] [address] since your last visit? Include any pap smears or colon screening. NO    Advanced Directive:   1. Do you have an Advanced Directive? NO    2. Would you like information on Advanced Directives?  NO

## 2019-05-30 ENCOUNTER — TELEPHONE (OUTPATIENT)
Dept: FAMILY MEDICINE CLINIC | Age: 39
End: 2019-05-30

## 2019-05-30 RX ORDER — AMOXICILLIN AND CLAVULANATE POTASSIUM 500; 125 MG/1; MG/1
1 TABLET, FILM COATED ORAL EVERY 12 HOURS
Qty: 20 TAB | Refills: 0 | Status: SHIPPED | OUTPATIENT
Start: 2019-05-30 | End: 2019-06-09

## 2019-05-30 NOTE — TELEPHONE ENCOUNTER
Patient called to stat that she is not improved. Will you please call something in as discussed in her visit?

## 2019-06-12 RX ORDER — FLUCONAZOLE 150 MG/1
150 TABLET ORAL DAILY
Qty: 1 TAB | Refills: 0 | Status: SHIPPED | OUTPATIENT
Start: 2019-06-12 | End: 2019-06-13

## 2019-08-06 NOTE — TELEPHONE ENCOUNTER
From: Scar Spears  Sent: 8/6/2019 8:18 AM EDT  Subject: Medication Renewal Request    Scar Spears would like a refill of the following medications: Other - Topamax or Topiramate 50 mg 3 pills 2x daily I am out of meds I am aware that Dr Ashtyn Pollock is no longer there but can this be sent to Dr Anna Keyes I saw her recently.      Preferred pharmacy: [place] [address]

## 2019-08-06 NOTE — TELEPHONE ENCOUNTER
Spoke with patient and informed her that Teena Zaragoza is not able to write this prescription for her. Patient has only been seen for an acute sick visit by NP León Olivarez. The prescription request has been forwarded back to [facility] to be addressed by the physician covering for Dr Darin Barriag.

## 2019-08-06 NOTE — TELEPHONE ENCOUNTER
Pt calling to request medication refill of:    Requested Prescriptions     Pending Prescriptions Disp Refills    topiramate (TOPAMAX) 100 mg tablet 60 Tab 5     Sig: Take 2 Tabs by mouth two (2) times a day. be sent to [address] [facility]. [place] [address]. Pt has about 0 tabs remaining. Please have another provider refill Pt was made aware that their provider is not in the office      Pts last appt was 7/28/18  Advised pt of 72 hour time frame for refill requests. Please advise.

## 2019-08-07 RX ORDER — TOPIRAMATE 50 MG/1
TABLET, FILM COATED ORAL
Qty: 180 TAB | Refills: 1 | Status: SHIPPED | OUTPATIENT
Start: 2019-08-07 | End: 2019-10-14 | Stop reason: SDUPTHER

## 2019-08-07 NOTE — TELEPHONE ENCOUNTER
Pt called asking to speak with  in regards to medication refill not being ready. Spoke with Office manage in regards to Pt's concerns about refill.

## 2019-09-11 ENCOUNTER — TELEPHONE (OUTPATIENT)
Dept: FAMILY MEDICINE CLINIC | Age: 39
End: 2019-09-11

## 2019-09-11 NOTE — TELEPHONE ENCOUNTER
Pt called requesting that she would like to change pcp from [facility] to [facility].  Pt stated since she was last seen by [facility] in march she changed pcp to Dr. Yanelis Cherry at [address] [place] [address] but would like to change

## 2019-09-27 ENCOUNTER — OFFICE VISIT (OUTPATIENT)
Dept: FAMILY MEDICINE CLINIC | Age: 39
End: 2019-09-27

## 2019-09-27 VITALS
HEIGHT: 64 IN | WEIGHT: 160 LBS | DIASTOLIC BLOOD PRESSURE: 68 MMHG | TEMPERATURE: 98.3 F | BODY MASS INDEX: 27.31 KG/M2 | HEART RATE: 80 BPM | SYSTOLIC BLOOD PRESSURE: 94 MMHG | OXYGEN SATURATION: 100 % | RESPIRATION RATE: 20 BRPM

## 2019-09-27 DIAGNOSIS — G40.909 SEIZURE DISORDER (HCC): Chronic | ICD-10-CM

## 2019-09-27 DIAGNOSIS — Z00.00 ANNUAL PHYSICAL EXAM: Primary | ICD-10-CM

## 2019-09-27 PROBLEM — N80.9 ENDOMETRIOSIS: Status: ACTIVE | Noted: 2019-05-08

## 2019-09-27 PROBLEM — E04.1 THYROID NODULE: Status: ACTIVE | Noted: 2019-02-12

## 2019-09-27 PROBLEM — N94.6 DYSMENORRHEA: Status: ACTIVE | Noted: 2019-02-12

## 2019-09-27 RX ORDER — FOLIC ACID 1 MG/1
1 TABLET ORAL
COMMUNITY
End: 2019-09-27

## 2019-09-27 RX ORDER — TIZANIDINE 4 MG/1
4 TABLET ORAL
COMMUNITY
Start: 2019-06-10 | End: 2019-09-27

## 2019-09-27 RX ORDER — BENZOCAINE AND MENTHOL 15; 3.6 MG/1; MG/1
LOZENGE ORAL
Refills: 0 | COMMUNITY
Start: 2019-08-12 | End: 2020-01-17

## 2019-09-27 RX ORDER — ELETRIPTAN HYDROBROMIDE 40 MG/1
40 TABLET, FILM COATED ORAL
COMMUNITY
Start: 2018-08-03 | End: 2019-09-27

## 2019-09-27 RX ORDER — CEPHALEXIN 500 MG/1
CAPSULE ORAL
Refills: 0 | COMMUNITY
Start: 2019-09-16 | End: 2019-09-27

## 2019-09-27 RX ORDER — NAPROXEN 500 MG/1
TABLET, DELAYED RELEASE ORAL
COMMUNITY
End: 2020-01-17

## 2019-09-27 RX ORDER — SULFAMETHOXAZOLE AND TRIMETHOPRIM 800; 160 MG/1; MG/1
TABLET ORAL
Refills: 0 | COMMUNITY
Start: 2019-09-19 | End: 2019-09-27

## 2019-09-27 RX ORDER — ONDANSETRON 4 MG/1
TABLET, FILM COATED ORAL
Refills: 0 | COMMUNITY
Start: 2019-09-19 | End: 2019-09-27

## 2019-09-27 NOTE — PROGRESS NOTES
Paulette Fernandez     Chief Complaint   Patient presents with    Physical     Vitals:    09/27/19 1421   BP: 94/68   Pulse: 80   Resp: 20   Temp: 98.3 °F (36.8 °C)   TempSrc: Oral   SpO2: 100%   Weight: 160 lb (72.6 kg)   Height: 5' 4\" (1.626 m)   PainSc:   0 - No pain         HPI: Patient is here for follow-up and to get annual physical examination she has no acute complaint she had her Pap smear done at [facility], Sona Davenport MD    Patient had a history of epilepsy which has been stable on Topamax, she follow-up with neurology at [facility], Cecile Velazquez MD    She also see pain management Dr. Jazmin Myrick and recently had MRI of cervical spine that showed degenerative disc disease and she is receiving physical therapy and dry needling at [facility] physical therapy she has been having improvement in her symptoms. Otherwise patient has no complaints    Does not smoke drink alcohol only occasionally , no illicit drug abuse.     And she has not been exercising recently due to her neck pain       Past Medical History:   Diagnosis Date    BV (bacterial vaginosis)     Chronic BV    Depression     Epilepsy ([address] 2001    Dr. Arminda Riedel H/O exercise stress test 11/26/2013    exercise nuc stress test wnl with EF 83%    Hematuria, microscopic 2015    Pelvic floor dysfunction     Recurrent UTI     Seizures (HCC)     Urinary frequency     UTI (urinary tract infection)      Past Surgical History:   Procedure Laterality Date    HX CHOLECYSTECTOMY  2005    HX TUBAL LIGATION  2008    LAP,CHOLECYSTECTOMY       Social History     Tobacco Use    Smoking status: Never Smoker    Smokeless tobacco: Never Used   Substance Use Topics    Alcohol use: No       Family History   Problem Relation Age of Onset    Thyroid Disease Mother     Alcohol abuse Father     Thyroid Disease Maternal Aunt     Thyroid Disease Maternal Grandmother     Cataract Paternal Grandmother        Review of Systems Constitutional: Negative for chills, fever, malaise/fatigue and weight loss. HENT: Negative for congestion, ear discharge, ear pain, hearing loss, nosebleeds, sinus pain and sore throat. Eyes: Negative for blurred vision, double vision and discharge. Respiratory: Negative for cough, hemoptysis, sputum production, shortness of breath and wheezing. Cardiovascular: Negative for chest pain, palpitations, claudication and leg swelling. Gastrointestinal: Negative for abdominal pain, constipation, diarrhea, nausea and vomiting. Genitourinary: Negative for dysuria, frequency, hematuria and urgency. Musculoskeletal: Positive for back pain. Negative for joint pain, myalgias and neck pain. Skin: Negative for itching and rash. Neurological: Negative for dizziness, tingling, sensory change, speech change, focal weakness, seizures, weakness and headaches. Psychiatric/Behavioral: Negative for depression, hallucinations, substance abuse and suicidal ideas. The patient is not nervous/anxious and does not have insomnia. Physical Exam   Constitutional: She is oriented to person, place, and time. She appears well-developed and well-nourished. No distress. HENT:   Head: Normocephalic and atraumatic. Mouth/Throat: Oropharynx is clear and moist. No oropharyngeal exudate. Eyes: Pupils are equal, round, and reactive to light. Conjunctivae are normal. Right eye exhibits no discharge. Left eye exhibits no discharge. No scleral icterus. Neck: Normal range of motion. Neck supple. No thyromegaly present. Cardiovascular: Normal rate, regular rhythm and normal heart sounds. No murmur heard. Pulmonary/Chest: Effort normal and breath sounds normal. No respiratory distress. She has no wheezes. She has no rales. She exhibits no tenderness. Abdominal: Soft. She exhibits no distension. There is no tenderness. There is no rebound. Musculoskeletal: Normal range of motion.  She exhibits no edema, tenderness or deformity. Lymphadenopathy:     She has no cervical adenopathy. Neurological: She is alert and oriented to person, place, and time. No cranial nerve deficit. Coordination normal.   Skin: Skin is warm and dry. No rash noted. She is not diaphoretic. No erythema. No pallor. Psychiatric: She has a normal mood and affect. Her behavior is normal. Judgment and thought content normal.   Nursing note and vitals reviewed. Assessment and plan     Plan of care has been discussed with the patient, he agrees to the plan and verbalized understanding. All his questions were answered  More than 50% of the time spent in this visit was counseling the patient about  illness and treatment options         1. Annual physical exam    - CBC WITH AUTOMATED DIFF; Future  - LIPID PANEL; Future  - METABOLIC PANEL, COMPREHENSIVE; Future    2. Seizure disorder (HCC)      Current Outpatient Medications   Medication Sig Dispense Refill    naproxen EC (EC-NAPROXEN) 500 mg EC tablet naproxen 500 mg tablet   TK 1 T PO  BID FOR 10 DAYS      levonorgestrel (MIRENA) 20 mcg/24 hours (5 yrs) 52 mg IUD Mirena 20 mcg/24 hours (5 yrs) 52 mg intrauterine device   Take 1 device by intrauterine route.       CEPACOL SORE THROAT, CORNELIA-MEN, 15-3.6 mg lozg lozenge USE 1 LOZENGE PO QID  0    topiramate (TOPAMAX) 50 mg tablet 3 tablets twice daily 180 Tab 1       Patient Active Problem List    Diagnosis Date Noted    Endometriosis 05/08/2019    Dysmenorrhea 02/12/2019    Thyroid nodule 02/12/2019    Loose skin 07/27/2016    Allergic reaction 07/27/2016    Abnormal uterine bleeding (AUB) 07/27/2016    Vitamin D deficiency 01/25/2016    CKD (chronic kidney disease) stage 2, GFR 60-89 ml/min 05/13/2015    GARCIA (dyspnea on exertion) 03/03/2014    Seizure disorder ([facility] [address] 11/26/2013     Results for orders placed or performed during the hospital encounter of 10/17/18   VITAMIN B12   Result Value Ref Range    Vitamin B12 465 211 - 911 pg/mL     No visits with results within 3 Month(s) from this visit. Latest known visit with results is:   Hospital Outpatient Visit on 10/17/2018   Component Date Value Ref Range Status    Vitamin B12 10/17/2018 465  211 - 911 pg/mL Final          Follow-up and Dispositions    · Return in about 6 months (around 3/27/2020) for as per results.

## 2019-09-27 NOTE — PROGRESS NOTES
Juan Marroquin is a 34 y.o. female presents in office for   Chief Complaint   Patient presents with    Physical         Health Maintenance Due   Topic Date Due    Pneumococcal 0-64 years (1 of 3 - PCV13) 11/06/1984       1. Have you been to the ER, urgent care clinic since your last visit? Hospitalized since your last visit? Yes When: 8/2019 Where: [facility]    2. Have you seen or consulted any other health care providers outside of the [address] [place] [address] since your last visit? Include any pap smears or colon screening.  No

## 2019-09-28 LAB
ABSOLUTE BANDS, 67058: NORMAL
ABSOLUTE BLASTS: NORMAL
ABSOLUTE METAMYELOCYTES, 900360: NORMAL
ABSOLUTE MYELOCYTES: NORMAL
ABSOLUTE NRBC,ANRBC: NORMAL
ABSOLUTE PROMYELOCYTES: NORMAL
ALB/GLOBRATIO, 58C: 1.2 (CALC) (ref 1–2.5)
ALBUMIN SERPL-MCNC: 4.3 G/DL (ref 3.6–5.1)
ALP SERPL-CCNC: 79 U/L (ref 33–115)
ALT SERPL-CCNC: 10 U/L (ref 6–29)
AST SERPL W P-5'-P-CCNC: 11 U/L (ref 10–30)
BANDS,BANDS: NORMAL
BASOPHILS # BLD: 70 CELLS/UL (ref 0–200)
BASOPHILS NFR BLD: 1.1 %
BILIRUB SERPL-MCNC: 0.3 MG/DL (ref 0.2–1.2)
BLASTS,BLAST: NORMAL
BUN SERPL-MCNC: 17 MG/DL (ref 7–25)
BUN/CREATININE RATIO,BUCR: 15 (CALC) (ref 6–22)
CALCIUM SERPL-MCNC: 9.3 MG/DL (ref 8.6–10.2)
CHLORIDE SERPL-SCNC: 108 MMOL/L (ref 98–110)
CHOL/HDL RATIO,CHHDX: 2.9 (CALC)
CHOLEST SERPL-MCNC: 172 MG/DL
CO2 SERPL-SCNC: 22 MMOL/L (ref 20–32)
COMMENT(S): NORMAL
CREAT SERPL-MCNC: 1.15 MG/DL (ref 0.5–1.1)
EOSINOPHIL # BLD: 122 CELLS/UL (ref 15–500)
EOSINOPHIL NFR BLD: 1.9 %
ERYTHROCYTE [DISTWIDTH] IN BLOOD BY AUTOMATED COUNT: 12.6 % (ref 11–15)
GLOBULIN,GLOB: 3.5 G/DL (CALC) (ref 1.9–3.7)
GLUCOSE SERPL-MCNC: 85 MG/DL (ref 65–99)
HCT VFR BLD AUTO: 39.3 % (ref 35–45)
HDLC SERPL-MCNC: 60 MG/DL
HGB BLD-MCNC: 13.6 G/DL (ref 11.7–15.5)
LDL-CHOLESTEROL: 91 MG/DL (CALC)
LYMPHOCYTES # BLD: 3117 CELLS/UL (ref 850–3900)
LYMPHOCYTES NFR BLD: 48.7 %
MCH RBC QN AUTO: 28.9 PG (ref 27–33)
MCHC RBC AUTO-ENTMCNC: 34.6 G/DL (ref 32–36)
MCV RBC AUTO: 83.4 FL (ref 80–100)
METAMYELOCYTES,METAS: NORMAL
MONOCYTES # BLD: 544 CELLS/UL (ref 200–950)
MONOCYTES NFR BLD: 8.5 %
MYELOCYTES,MYELO: NORMAL
NEUTROPHILS # BLD AUTO: 2547 CELLS/UL (ref 1500–7800)
NEUTROPHILS # BLD: 39.8 %
NON-HDL CHOLESTEROL, 011976: 112 MG/DL (CALC)
NRBC: NORMAL
PLATELET # BLD AUTO: 330 THOUSAND/UL (ref 140–400)
PMV BLD AUTO: 10.5 FL (ref 7.5–12.5)
POTASSIUM SERPL-SCNC: 3.7 MMOL/L (ref 3.5–5.3)
PROMYELOCYTES,PRO: NORMAL
PROT SERPL-MCNC: 7.8 G/DL (ref 6.1–8.1)
RBC # BLD AUTO: 4.71 MILLION/UL (ref 3.8–5.1)
REACTIVE LYMPHS: NORMAL
SODIUM SERPL-SCNC: 140 MMOL/L (ref 135–146)
TRIGL SERPL-MCNC: 111 MG/DL (ref ?–150)
WBC # BLD AUTO: 6.4 THOUSAND/UL (ref 3.8–10.8)

## 2019-09-29 NOTE — PROGRESS NOTES
Mild change in creatinine level , to prevent further kidney damage patient  Need to avoid all NSAIDs like naproxen, Motrin, ibuprofen,advil and all  Generic equivalents

## 2019-09-30 ENCOUNTER — TELEPHONE (OUTPATIENT)
Dept: FAMILY MEDICINE CLINIC | Age: 39
End: 2019-09-30

## 2019-09-30 NOTE — TELEPHONE ENCOUNTER
Patient contacted with results per PCP, verified 2 patient identifiers. She states she takes Aleve and Ibuprofen daily due to [place] lot of back problems\". She is asking what she can take that will help alleviate the pain but won't effect her kidney or liver. Please advise.

## 2019-09-30 NOTE — TELEPHONE ENCOUNTER
----- Message from Nely Laureano MD sent at 9/29/2019  2:51 PM EDT -----  Mild change in creatinine level , to prevent further kidney damage patient  Need to avoid all NSAIDs like naproxen, Motrin, ibuprofen,advil and all  Generic equivalents

## 2019-10-01 NOTE — TELEPHONE ENCOUNTER
Patient to consider taking Tylenol2 tablets every 6 hours as needed, otherwise patient can consider evaluation by pain management to consider long-term medication for back pain, that she was seen in the past by pain management if she is still following up with them? ?

## 2019-10-01 NOTE — TELEPHONE ENCOUNTER
Notified patient. She states she is already seeing pain management. She was advised to take Tylenol 2 tablets every 6 hours as needed. She verbalized understanding. No further questions or concerns.

## 2019-10-14 DIAGNOSIS — G40.909 SEIZURE DISORDER (HCC): Primary | Chronic | ICD-10-CM

## 2019-10-14 RX ORDER — TOPIRAMATE 50 MG/1
TABLET, FILM COATED ORAL
Qty: 180 TAB | Refills: 0 | Status: SHIPPED | OUTPATIENT
Start: 2019-10-14 | End: 2020-01-17 | Stop reason: DRUGHIGH

## 2019-11-01 ENCOUNTER — OFFICE VISIT (OUTPATIENT)
Dept: FAMILY MEDICINE CLINIC | Age: 39
End: 2019-11-01

## 2019-11-01 VITALS
TEMPERATURE: 97.6 F | RESPIRATION RATE: 18 BRPM | SYSTOLIC BLOOD PRESSURE: 109 MMHG | WEIGHT: 161.4 LBS | DIASTOLIC BLOOD PRESSURE: 74 MMHG | HEART RATE: 71 BPM | OXYGEN SATURATION: 100 % | HEIGHT: 64 IN | BODY MASS INDEX: 27.55 KG/M2

## 2019-11-01 DIAGNOSIS — R10.31 RIGHT LOWER QUADRANT ABDOMINAL PAIN: Primary | ICD-10-CM

## 2019-11-01 LAB
BILIRUB UR QL STRIP: NEGATIVE
GLUCOSE UR-MCNC: NEGATIVE MG/DL
KETONES P FAST UR STRIP-MCNC: NEGATIVE MG/DL
PH UR STRIP: 7 [PH] (ref 4.6–8)
PROT UR QL STRIP: NEGATIVE
SP GR UR STRIP: 1.02 (ref 1–1.03)
UA UROBILINOGEN AMB POC: NORMAL (ref 0.2–1)
URINALYSIS CLARITY POC: CLEAR
URINALYSIS COLOR POC: NORMAL
URINE BLOOD POC: NORMAL
URINE LEUKOCYTES POC: NORMAL
URINE NITRITES POC: NEGATIVE

## 2019-11-01 RX ORDER — SUMATRIPTAN 6 MG/.5ML
6 INJECTION, SOLUTION SUBCUTANEOUS AS NEEDED
COMMUNITY
End: 2020-01-17

## 2019-11-01 NOTE — PROGRESS NOTES
Judith Camarena is a 34 y.o. female (: 1980) presenting to address:    Chief Complaint   Patient presents with    Abdominal Pain       Vitals:    19 1324   BP: 109/74   Pulse: 71   Resp: 18   Temp: 97.6 °F (36.4 °C)   TempSrc: Oral   SpO2: 100%   Weight: 161 lb 6.4 oz (73.2 kg)   Height: 5' 4\" (1.626 m)   PainSc:   8   PainLoc: Abdomen       Learning Assessment:     Learning Assessment 2016   PRIMARY LEARNER Patient   HIGHEST LEVEL OF EDUCATION - PRIMARY LEARNER  4 YEARS OF COLLEGE   BARRIERS PRIMARY LEARNER NONE   CO-LEARNER CAREGIVER No   PRIMARY LANGUAGE ENGLISH   LEARNER PREFERENCE PRIMARY VIDEOS     LISTENING   ANSWERED BY self   RELATIONSHIP SELF     Depression Screening:     3 most recent PHQ Screens 2019   Little interest or pleasure in doing things Not at all   Feeling down, depressed, irritable, or hopeless Not at all   Total Score PHQ 2 0     Fall Risk Assessment:     Fall Risk Assessment, last 12 mths 2019   Able to walk? Yes   Fall in past 12 months? No     Abuse Screening:     Abuse Screening Questionnaire 2019   Do you ever feel afraid of your partner? N   Are you in a relationship with someone who physically or mentally threatens you? N   Is it safe for you to go home? Y       Advanced Directive:   1. Do you have an Advanced Directive? NO    2. Would you like information on Advanced Directives?  YES

## 2019-11-01 NOTE — PROGRESS NOTES
HISTORY OF PRESENT ILLNESS  Charles Johnson is a 34 y.o. female. HPI  C/o abdominal pain, started yesterday around the umbilicus, then moved to the right lower abdomin, pain is constant, severe, 8/10, associated with nausea, no vomiting, worse when she walk or cough  Review of Systems   Constitutional: Negative for chills and fever. Gastrointestinal: Positive for nausea. Negative for melena and vomiting. Genitourinary: Negative for dysuria, frequency and hematuria. Physical Exam   Constitutional: She is oriented to person, place, and time. She has a sickly appearance. Cardiovascular: Normal rate, regular rhythm and normal heart sounds. Pulmonary/Chest: Effort normal and breath sounds normal.   Abdominal: Soft. Bowel sounds are normal. There is tenderness. There is rebound and tenderness at McBurney's point. There is no rigidity. Neurological: She is alert and oriented to person, place, and time. Vitals reviewed. ASSESSMENT and PLAN  Diagnoses and all orders for this visit:    1. Lower abdominal pain  -     AMB POC URINALYSIS DIP STICK AUTO W/O MICRO  - ?  Appendicitis, r/o ruptured ovarian cyst/ectopic pregnancy  - discussed with pt, need urgent evaluation including ct abdomin and labs, she will go to the ER now, [facility] ER notified    Results for orders placed or performed in visit on 11/01/19   AMB POC URINALYSIS DIP STICK AUTO W/O MICRO   Result Value Ref Range    Color (UA POC) Dark Yellow     Clarity (UA POC) Clear     Glucose (UA POC) Negative Negative    Bilirubin (UA POC) Negative Negative    Ketones (UA POC) Negative Negative    Specific gravity (UA POC) 1.020 1.001 - 1.035    Blood (UA POC) Trace Negative    pH (UA POC) 7.0 4.6 - 8.0    Protein (UA POC) Negative Negative    Urobilinogen (UA POC) 1 mg/dL 0.2 - 1    Nitrites (UA POC) Negative Negative    Leukocyte esterase (UA POC) 1+ Negative

## 2019-11-05 NOTE — TELEPHONE ENCOUNTER
Okay with me but should be scheduled for the next available 30min slot for transfer of care. No future appointments.

## 2019-12-06 ENCOUNTER — OFFICE VISIT (OUTPATIENT)
Dept: FAMILY MEDICINE CLINIC | Age: 39
End: 2019-12-06

## 2019-12-06 VITALS
HEIGHT: 64 IN | HEART RATE: 79 BPM | BODY MASS INDEX: 28.03 KG/M2 | RESPIRATION RATE: 18 BRPM | TEMPERATURE: 98.8 F | DIASTOLIC BLOOD PRESSURE: 84 MMHG | WEIGHT: 164.2 LBS | SYSTOLIC BLOOD PRESSURE: 123 MMHG | OXYGEN SATURATION: 100 %

## 2019-12-06 DIAGNOSIS — R22.0 LIP SWELLING: ICD-10-CM

## 2019-12-06 DIAGNOSIS — H10.9 BACTERIAL CONJUNCTIVITIS OF RIGHT EYE: ICD-10-CM

## 2019-12-06 DIAGNOSIS — T78.40XA ALLERGIC REACTION, INITIAL ENCOUNTER: Primary | ICD-10-CM

## 2019-12-06 RX ORDER — NITROFURANTOIN 25; 75 MG/1; MG/1
100 CAPSULE ORAL 2 TIMES DAILY
Qty: 14 CAP | Refills: 0 | Status: SHIPPED | OUTPATIENT
Start: 2019-12-06 | End: 2020-01-17

## 2019-12-06 RX ORDER — ERYTHROMYCIN 5 MG/G
OINTMENT OPHTHALMIC
Qty: 1 TUBE | Refills: 0 | Status: SHIPPED | OUTPATIENT
Start: 2019-12-06 | End: 2020-01-17

## 2019-12-06 NOTE — PROGRESS NOTES
Kameron Whiting is a 37 y.o. female (: 1980) presenting to address:    Chief Complaint   Patient presents with    Allergic Reaction     possible reaction to generic medication    Watery Eyes     RT    Lip Swelling     lip swelling and numbness       Vitals:    19 0838   BP: 123/84   Pulse: 79   Resp: 18   Temp: 98.8 °F (37.1 °C)   TempSrc: Oral   SpO2: 100%   Weight: 164 lb 3.2 oz (74.5 kg)   Height: 5' 4\" (1.626 m)   PainSc:  10 - Worst pain ever   PainLoc: Eye       Hearing/Vision:   No exam data present    Learning Assessment:     Learning Assessment 2016   PRIMARY LEARNER Patient   HIGHEST LEVEL OF EDUCATION - PRIMARY LEARNER  4 YEARS OF COLLEGE   BARRIERS PRIMARY LEARNER NONE   CO-LEARNER CAREGIVER No   PRIMARY LANGUAGE ENGLISH   LEARNER PREFERENCE PRIMARY VIDEOS     LISTENING   ANSWERED BY self   RELATIONSHIP SELF     Depression Screening:     3 most recent PHQ Screens 2019   Little interest or pleasure in doing things Not at all   Feeling down, depressed, irritable, or hopeless Not at all   Total Score PHQ 2 0     Fall Risk Assessment:     Fall Risk Assessment, last 12 mths 2019   Able to walk? Yes   Fall in past 12 months? No     Abuse Screening:     Abuse Screening Questionnaire 2019   Do you ever feel afraid of your partner? N   Are you in a relationship with someone who physically or mentally threatens you? N   Is it safe for you to go home? Y     Coordination of Care Questionaire:   1. Have you been to the ER, urgent care clinic since your last visit? Hospitalized since your last visit? YES [facility]    2. Have you seen or consulted any other health care providers outside of the [address] [place] [address] since your last visit? Include any pap smears or colon screening. NO    Advanced Directive:   1. Do you have an Advanced Directive? NO    2. Would you like information on Advanced Directives?  NO

## 2019-12-06 NOTE — PATIENT INSTRUCTIONS
Advised patient to use Benadryl as needed for the next several days going into the weekend if any change in condition straight to the ER or urgent care.   For right eye concerns likely pinkeye ointment sent to pharmacy patient should monitor symptoms if any change in condition to an ophthalmologist.

## 2019-12-18 NOTE — PROGRESS NOTES
Samaria Lan is a 37 y.o.  female and presents with    Chief Complaint   Patient presents with    Allergic Reaction     possible reaction to generic medication    Watery Eyes     RT    Lip Swelling     lip swelling and numbness         Subjective:  Patient presents for possible allergic reaction. Patient was taking Bactrim sulfa based medication for UTI and started to develop symptoms of lip swelling, initially a rash she stopped medication made appointment. She has taken Benadryl at home with some success but lip swelling is still present. Patient also with concerns of watery eye right eye specific concerned if this is a part of allergic reaction or separate issue for january. Current Outpatient Medications   Medication Sig Dispense Refill    OTHER       erythromycin (ILOTYCIN) ophthalmic ointment Apply 1 cm ribbon to affected eye every 6 hours x5 days. 1 Tube 0    nitrofurantoin, macrocrystal-monohydrate, (MACROBID) 100 mg capsule Take 1 Cap by mouth two (2) times a day. 14 Cap 0    topiramate (TOPAMAX) 50 mg tablet TAKE 3 TABLETS BY MOUTH TWICE DAILY 180 Tab 0    naproxen EC (EC-NAPROXEN) 500 mg EC tablet naproxen 500 mg tablet   TK 1 T PO  BID FOR 10 DAYS      levonorgestrel (MIRENA) 20 mcg/24 hours (5 yrs) 52 mg IUD Mirena 20 mcg/24 hours (5 yrs) 52 mg intrauterine device   Take 1 device by intrauterine route.  SUMAtriptan (IMITREX) 6 mg/0.5 mL injection 6 mg by SubCUTAneous route as needed.  CEPACOL SORE THROAT, CORNELIA-MEN, 15-3.6 mg lozg lozenge USE 1 LOZENGE PO QID  0     Allergies   Allergen Reactions    Sulfa (Sulfonamide Antibiotics) Swelling    Codeine Hives    Iodinated Contrast Media Itching and Swelling    Iodine Unknown (comments)    Pecan Nut Other (comments)     Throat and ears itch    Prednisone Other (comments)    Shellfish Derived Swelling       Review of Systems   Eyes: Positive for discharge and redness.    Skin:        + lip swelling Objective:  Vitals:    12/06/19 0838   BP: 123/84   Pulse: 79   Resp: 18   Temp: 98.8 °F (37.1 °C)   TempSrc: Oral   SpO2: 100%   Weight: 164 lb 3.2 oz (74.5 kg)   Height: 5' 4\" (1.626 m)   PainSc:  10 - Worst pain ever   PainLoc: Eye     General:   Well-groomed, well-nourished, in no distress, pleasant, alert, appropriate    Eyes:    PERRL, EOMI. Conjunctivae + conjunctivitis  Cardiovasc:   RRR,  no murmur, rubs or gallops. Pulmonary:    Lungs clear bilaterally, no wheezing, rales or rhonchi. Skin:    + lip swelling, no rash      Assessment/Plan:      1. Bacterial conjunctivitis of right eye  2. Lip swelling  3. Allergic reaction, initial encounter  Stop taking any sulfa-based medications going forward , advised patient to use Benadryl as needed for the next several days going into the weekend if any change in condition straight to the ER or urgent care. For right eye concerns likely pinkeye ointment sent to pharmacy patient should monitor symptoms if any change in condition to an ophthalmologist.    I have discussed the diagnosis with the patient and the intended plan as seen in the above orders. The patient has received an after-visit summary and questions were answered concerning future plans. I have discussed medication side effects and warnings with the patient as well. I have reviewed the plan of care with the patient, accepted their input and they are in agreement with the treatment goals. Follow-up and Dispositions    · Return if symptoms worsen or fail to improve. More than 1/2 of this 15 minute visit was spent face to face in counselling and coordination of care, as described above. This document may have been created with the aid of dictation software. Text may contain errors, particularly phonetic errors.      Jerry Card FNP-BC

## 2019-12-29 RX ORDER — FLUCONAZOLE 150 MG/1
150 TABLET ORAL DAILY
Qty: 1 TAB | Refills: 0 | Status: SHIPPED | OUTPATIENT
Start: 2019-12-29 | End: 2020-04-20 | Stop reason: SDUPTHER

## 2020-01-17 ENCOUNTER — HOSPITAL ENCOUNTER (OUTPATIENT)
Dept: LAB | Age: 40
Discharge: HOME | End: 2020-01-17
Payer: COMMERCIAL

## 2020-01-17 ENCOUNTER — OFFICE VISIT (OUTPATIENT)
Dept: FAMILY MEDICINE CLINIC | Age: 40
End: 2020-01-17

## 2020-01-17 VITALS
WEIGHT: 162 LBS | DIASTOLIC BLOOD PRESSURE: 68 MMHG | BODY MASS INDEX: 27.66 KG/M2 | TEMPERATURE: 98.3 F | RESPIRATION RATE: 16 BRPM | HEART RATE: 68 BPM | SYSTOLIC BLOOD PRESSURE: 97 MMHG | HEIGHT: 64 IN | OXYGEN SATURATION: 100 %

## 2020-01-17 DIAGNOSIS — N93.9 ABNORMAL UTERINE BLEEDING (AUB): ICD-10-CM

## 2020-01-17 DIAGNOSIS — E55.9 VITAMIN D DEFICIENCY: Chronic | ICD-10-CM

## 2020-01-17 DIAGNOSIS — R39.15 URINARY URGENCY: ICD-10-CM

## 2020-01-17 DIAGNOSIS — G40.909 SEIZURE DISORDER (HCC): Chronic | ICD-10-CM

## 2020-01-17 DIAGNOSIS — N39.0 FREQUENT UTI: ICD-10-CM

## 2020-01-17 DIAGNOSIS — N18.2 CKD (CHRONIC KIDNEY DISEASE) STAGE 2, GFR 60-89 ML/MIN: ICD-10-CM

## 2020-01-17 DIAGNOSIS — R39.15 URINARY URGENCY: Primary | ICD-10-CM

## 2020-01-17 DIAGNOSIS — N80.9 ENDOMETRIOSIS: ICD-10-CM

## 2020-01-17 DIAGNOSIS — R10.2 CHRONIC PELVIC PAIN IN FEMALE: ICD-10-CM

## 2020-01-17 DIAGNOSIS — E04.1 THYROID NODULE: ICD-10-CM

## 2020-01-17 DIAGNOSIS — G89.29 CHRONIC PELVIC PAIN IN FEMALE: ICD-10-CM

## 2020-01-17 DIAGNOSIS — K59.00 CONSTIPATION, UNSPECIFIED CONSTIPATION TYPE: ICD-10-CM

## 2020-01-17 LAB
25(OH)D3 SERPL-MCNC: 22.4 NG/ML (ref 30–100)
ALBUMIN SERPL-MCNC: 4 G/DL (ref 3.4–5)
ANION GAP SERPL CALC-SCNC: 5 MMOL/L (ref 3–18)
APPEARANCE UR: ABNORMAL
BACTERIA URNS QL MICRO: ABNORMAL /HPF
BILIRUB UR QL STRIP: ABNORMAL
BILIRUB UR QL: ABNORMAL
BUN SERPL-MCNC: 14 MG/DL (ref 7–18)
BUN/CREAT SERPL: 13 (ref 12–20)
CALCIUM SERPL-MCNC: 9.4 MG/DL (ref 8.5–10.1)
CHLORIDE SERPL-SCNC: 112 MMOL/L (ref 100–111)
CO2 SERPL-SCNC: 22 MMOL/L (ref 21–32)
COLOR UR: ABNORMAL
CREAT SERPL-MCNC: 1.08 MG/DL (ref 0.6–1.3)
EPITH CASTS URNS QL MICRO: ABNORMAL /LPF (ref 0–5)
GLUCOSE SERPL-MCNC: 89 MG/DL (ref 74–99)
GLUCOSE UR STRIP.AUTO-MCNC: NEGATIVE MG/DL
GLUCOSE UR-MCNC: ABNORMAL MG/DL
HGB UR QL STRIP: ABNORMAL
KETONES P FAST UR STRIP-MCNC: ABNORMAL MG/DL
KETONES UR QL STRIP.AUTO: NEGATIVE MG/DL
LEUKOCYTE ESTERASE UR QL STRIP.AUTO: ABNORMAL
NITRITE UR QL STRIP.AUTO: POSITIVE
PH UR STRIP: 5 [PH] (ref 4.6–8)
PH UR STRIP: 5 [PH] (ref 5–8)
PHOSPHATE SERPL-MCNC: 3.1 MG/DL (ref 2.5–4.9)
POTASSIUM SERPL-SCNC: 4.1 MMOL/L (ref 3.5–5.5)
PROT UR QL STRIP: ABNORMAL
PROT UR STRIP-MCNC: 30 MG/DL
RBC #/AREA URNS HPF: ABNORMAL /HPF (ref 0–5)
SODIUM SERPL-SCNC: 139 MMOL/L (ref 136–145)
SP GR UR REFRACTOMETRY: 1.02 (ref 1–1.03)
SP GR UR STRIP: 1.01 (ref 1–1.03)
T4 FREE SERPL-MCNC: 1 NG/DL (ref 0.7–1.5)
TSH SERPL DL<=0.05 MIU/L-ACNC: 1.27 UIU/ML (ref 0.36–3.74)
UA UROBILINOGEN AMB POC: ABNORMAL (ref 0.2–1)
URINALYSIS CLARITY POC: CLEAR
URINALYSIS COLOR POC: ABNORMAL
URINE BLOOD POC: ABNORMAL
URINE LEUKOCYTES POC: ABNORMAL
URINE NITRITES POC: POSITIVE
UROBILINOGEN UR QL STRIP.AUTO: 1 EU/DL (ref 0.2–1)
WBC URNS QL MICRO: ABNORMAL /HPF (ref 0–4)

## 2020-01-17 PROCEDURE — 87077 CULTURE AEROBIC IDENTIFY: CPT

## 2020-01-17 PROCEDURE — 87086 URINE CULTURE/COLONY COUNT: CPT

## 2020-01-17 PROCEDURE — 80069 RENAL FUNCTION PANEL: CPT

## 2020-01-17 PROCEDURE — 81001 URINALYSIS AUTO W/SCOPE: CPT

## 2020-01-17 PROCEDURE — 36415 COLL VENOUS BLD VENIPUNCTURE: CPT

## 2020-01-17 PROCEDURE — 87186 SC STD MICRODIL/AGAR DIL: CPT

## 2020-01-17 PROCEDURE — 84439 ASSAY OF FREE THYROXINE: CPT

## 2020-01-17 PROCEDURE — 84443 ASSAY THYROID STIM HORMONE: CPT

## 2020-01-17 PROCEDURE — 82306 VITAMIN D 25 HYDROXY: CPT

## 2020-01-17 RX ORDER — SUMATRIPTAN 3 MG/1
INJECTION, SOLUTION SUBCUTANEOUS
COMMUNITY
Start: 2019-12-19 | End: 2020-01-17

## 2020-01-17 RX ORDER — PHENAZOPYRIDINE HYDROCHLORIDE 95 MG/1
95 TABLET ORAL
COMMUNITY
End: 2020-01-27

## 2020-01-17 RX ORDER — TOPIRAMATE 200 MG/1
200 TABLET ORAL 2 TIMES DAILY
COMMUNITY
Start: 2019-12-10

## 2020-01-17 NOTE — PROGRESS NOTES
[address] [facility]  Primary Care Office Visit - Problem-Oriented    : 1980   John Portillo is a 37 y.o. female presenting for  Chief Complaint   Patient presents with   Kwasi Ham     re establish w/ Dr. Kendy Hollingsworth Urgency            Assessment/Plan:     1. Urinary urgency  With known hx of   2. Frequent UTI  Unfortunately POC UA invalid 2/2 recent pyridium use. Will check lab UA as well as UCx and treat accordingly.  - AMB POC URINALYSIS DIP STICK AUTO W/O MICRO  - URINALYSIS W/MICROSCOPIC; Future  - CULTURE, URINE; Future    with  3. Abnormal uterine bleeding (AUB)  and  4. Endometriosis  with  5. Chronic pelvic pain in female  and  6. Constipation, unspecified constipation type  Has upcoming appt with [facility] (or urogyn?). Will request records from GYN (Dr. Rohit Díaz), as well as Dr. Donta Smith (GI). Discussed briefly the role of pelvic floor PT for Dx #2, 5 & 6, to improve upon QOL and function, however would recommend holding off until eval for Dx #3 and 4 as it would not address these specific issues. Given info for her to read. 7. Seizure disorder ([facility] [address]  Followed by neurology. 8. CKD (chronic kidney disease) stage 2, GFR 60-89 ml/min  Unclear control.  - RENAL FUNCTION PANEL; Future    9. Vitamin D deficiency  Unclear control.  - VITAMIN D, 25 HYDROXY; Future    10. Thyroid nodule  Unclear control.  - T4, FREE; Future  - TSH 3RD GENERATION; Future      Follow-up and Dispositions    · Return in about 3 months (around 2020) for 30min follow-up. Orders & Diagnoses associated with This Encounter:         ICD-10-CM ICD-9-CM   1. Urinary urgency R39.15 788.63   2. Frequent UTI N39.0 599.0   3. Abnormal uterine bleeding (AUB) N93.9 626.9   4. Endometriosis N80.9 617.9   5. Chronic pelvic pain in female R10.2 625.9    G89.29 338.29   6. Constipation, unspecified constipation type K59.00 564.00   7. Seizure disorder ([address] G40.909 345.90   8.  CKD (chronic kidney disease) stage 2, GFR 60-89 ml/min N18.2 585.2   9. Vitamin D deficiency E55.9 268.9   10. Thyroid nodule E04.1 241.0       Orders Placed This Encounter    CULTURE, URINE     Standing Status:   Future     Number of Occurrences:   1     Standing Expiration Date:   1/17/2021    URINALYSIS W/MICROSCOPIC     Standing Status:   Future     Standing Expiration Date:   1/17/2021    RENAL FUNCTION PANEL     Standing Status:   Future     Number of Occurrences:   1     Standing Expiration Date:   1/17/2021    VITAMIN D, 25 HYDROXY     Standing Status:   Future     Number of Occurrences:   1     Standing Expiration Date:   1/17/2021    T4, FREE     Standing Status:   Future     Number of Occurrences:   1     Standing Expiration Date:   1/17/2021    TSH 3RD GENERATION     Standing Status:   Future     Number of Occurrences:   1     Standing Expiration Date:   1/17/2021    AMB POC URINALYSIS DIP STICK AUTO W/O MICRO    topiramate (TOPAMAX) 200 mg tablet     Sig: Take 200 mg by mouth two (2) times a day.  DISCONTD: ZEMBRACE SYMTOUCH 3 mg/0.5 mL pnij     Sig: INJECT AT ONSET OF HA MAY REPEAT IN 2 H MAX 2 INJECTIONS IN 24 H    cholecalciferol, vitamin D3, (VITAMIN D3 PO)     Sig: Take 1 Tab by mouth daily.  cyanocobalamin, vitamin B-12, (VITAMIN B-12) 1,000 mcg/mL drop     Sig: Take 1-2 Drops by mouth daily.  phenazopyridine (AZO URINARY PAIN RELIEF) 95 mg tab     Sig: Take 95 mg by mouth two (2) times daily as needed for Pain. This document may have been created with the aid of dictation software. Text may contain errors, particularly phonetic errors. Reviewed management plan & instructions with patient, who voiced understanding.          Domenica Rocha MD  Internal Medicine, Family Medicine & Sports Medicine  1/17/2020    Subjective   History:   Daniella Mendes is a 37 y.o. female presenting to address:  Chief Complaint   Patient presents with    Transitions Of Care     re establish w/ Dr. Perla Sanchez  Urgency     S/p colonoscopy on 12/24/2019  Issues with constipation, ongoing. Significant urinary urgency x 1-2 days with discomfort. Hx of recurrent UTI. Has seen urology in the past.  Has upcoming appt with [facility] urogynecology on 1/28/2020  \"issues with cycles, cysts and endometriosis\". Ongoing chronic pelvic pain. Follows regularly with neurology re: seizures. Was seizure free until recent interaction with OTC meds and abx. Has been given zembrace for abortive use for migraines, however hasn't used it yet. \"I'm a bit gunshy there, since the injectable imitrex was horrible for me. \"        Past Medical History:   Diagnosis Date    BV (bacterial vaginosis)     Chronic BV    Depression     Epilepsy ([facility] [address] 2001    Dr. Rasheed Mckeon H/O exercise stress test 11/26/2013    exercise nuc stress test wnl with EF 83%    Hematuria, microscopic 2015    Pelvic floor dysfunction     Recurrent UTI     Seizures (HCC)     Urinary frequency     UTI (urinary tract infection)      Past Surgical History:   Procedure Laterality Date    HX CHOLECYSTECTOMY  2005    HX TUBAL LIGATION  2008    LAP,CHOLECYSTECTOMY        reports that she has never smoked. She has never used smokeless tobacco. She reports previous drug use. She reports that she does not drink alcohol. Social History     Patient does not qualify to have social determinant information on file (likely too young). Social History Narrative    Works at [employer].      Social History     Tobacco Use   Smoking Status Never Smoker   Smokeless Tobacco Never Used     Family History   Problem Relation Age of Onset    Thyroid Disease Mother     Alcohol abuse Father     Thyroid Disease Maternal Aunt     Thyroid Disease Maternal Grandmother     Cataract Paternal Grandmother      Allergies   Allergen Reactions    Iodine Unknown (comments)     Other reaction(s): anaphylaxis/angioedema  Face throat, swelled after CT in 2001, had MRI w/wo Contrast in 2016 with no problem    Sulfa (Sulfonamide Antibiotics) Swelling    Codeine Hives    Iodinated Contrast Media Itching and Swelling    Pecan Nut Other (comments)     Throat and ears itch    Prednisone Other (comments)    Shellfish Derived Swelling    Sudafed [Pseudoephedrine Hcl] Other (comments)     Has seizure when combined w/ antibotic    Sulfamethoxazole-Trimethoprim Unknown (comments)     Stated had seizure r/t an interaction with seizure medication        Problem List:      Patient Active Problem List    Diagnosis    Seizure disorder ([facility] [address]     -- 6/1/2016 [neuro; Dr. Khari Amador: request previous record, consider EEG, increase topamax to 150mg BID; f/u 3-4mo    -- 10/7/2014 [neuro; Dr. Everton Parks: continue topamax 100mg BID, B12 1000mcg daily, triptans prn      Other constipation    Endometriosis    Dysmenorrhea    Thyroid nodule    Loose skin    Allergic reaction    Abnormal uterine bleeding (AUB)    Vitamin D deficiency     - VitD 7.9 (L) (1/25/2016); start 50k 2x/wk x 12wks, then 4000 units daily      Frequent UTI    CKD (chronic kidney disease) stage 2, GFR 60-89 ml/min     - eGFR 75 (5/5/2015) [obtained by neurology]      GARCIA (dyspnea on exertion)     -- 2/11/2014: per Dr. Lujean Cheadle, possible exercise induced asthma. Medications:     Current Outpatient Medications   Medication Sig    topiramate (TOPAMAX) 200 mg tablet Take 200 mg by mouth two (2) times a day.  cholecalciferol, vitamin D3, (VITAMIN D3 PO) Take 1 Tab by mouth daily.  cyanocobalamin, vitamin B-12, (VITAMIN B-12) 1,000 mcg/mL drop Take 1-2 Drops by mouth daily.  phenazopyridine (AZO URINARY PAIN RELIEF) 95 mg tab Take 95 mg by mouth two (2) times daily as needed for Pain.  levonorgestrel (MIRENA) 20 mcg/24 hours (5 yrs) 52 mg IUD Mirena 20 mcg/24 hours (5 yrs) 52 mg intrauterine device   Take 1 device by intrauterine route.     ciprofloxacin HCl (CIPRO) 500 mg tablet Take 1 Tab by mouth two (2) times a day for 10 days. No current facility-administered medications for this visit. Review of Systems:     Review of Systems   Constitutional: Negative for chills and fever. HENT: Negative for ear pain and sore throat. Respiratory: Negative for cough and shortness of breath. Cardiovascular: Negative for chest pain and palpitations. Gastrointestinal: Positive for constipation. Negative for abdominal pain. Genitourinary: Positive for dysuria, frequency and urgency. Negative for flank pain and hematuria. Musculoskeletal: Negative for myalgias. Skin: Negative for rash. Neurological: Negative for speech change, focal weakness and headaches. Endo/Heme/Allergies: Does not bruise/bleed easily. Psychiatric/Behavioral: Negative for depression. The patient is not nervous/anxious and does not have insomnia. Objective   Physical Assessment:   VS:    Vitals:    01/17/20 0901   BP: 97/68   Pulse: 68   Resp: 16   Temp: 98.3 °F (36.8 °C)   TempSrc: Oral   SpO2: 100%   Weight: 162 lb (73.5 kg)   Height: 5' 4\" (1.626 m)   PainSc:   6   PainLoc: Vagina     Physical Exam  Nursing note reviewed. Constitutional:       Appearance: Normal appearance. She is well-developed. She is not ill-appearing, toxic-appearing or diaphoretic. HENT:      Head: Normocephalic and atraumatic. Neck:      Musculoskeletal: Neck supple. Thyroid: No thyromegaly. Cardiovascular:      Rate and Rhythm: Normal rate and regular rhythm. Heart sounds: No murmur. Pulmonary:      Effort: Pulmonary effort is normal.      Breath sounds: Normal breath sounds. No wheezing or rales. Abdominal:      Tenderness: There is no right CVA tenderness or left CVA tenderness. Musculoskeletal: Normal range of motion. Skin:     General: Skin is warm and dry. Neurological:      Mental Status: She is alert and oriented to person, place, and time. Cranial Nerves: No cranial nerve deficit.       Coordination: Coordination normal.   Psychiatric:         Behavior: Behavior normal.         Thought Content: Thought content normal.         Judgment: Judgment normal.         Records Review:       Neuro (12/10/2019):  Assessment     (G43.009) Migraine without aura and without status migrainosus, not intractable    (G40.209) Complex partial seizure evolving to generalized seizure ([facility] [address]    (G40.109) Partial symptomatic epilepsy with simple partial seizures, not intractable, without status epilepticus (HCC)    (Z13.29, Z13.21, Z13.228, Z13.0) Screening for endocrine, nutritional, metabolic and immunity disorder - Plan: VITAMIN B12, VITAMIN D1,25 DIHYDROXY (CALCITRIOL)    (E56.9) Vitamin deficiency, unspecified - Plan: VITAMIN B12, VITAMIN D1,25 DIHYDROXY (CALCITRIOL)    Failed - Imitrex 50mg, Relpax 40mg, Imitrex 4mg shots    Plan     Clinical description of most recent event described as waking up with uinary incontinence, inner mouth bit marks, diffuse muscle soreness are highly suggestive of nocturnal breakthrough seizure. May also be a superimposed adverse reaction of the Sudafed combined with Bacrim causing some facial swelling and also possibly triggering an event. Track further seizure auras and all migraines  Increase TMX to 200mg bid for better HA & sz prevention    Change to Zembrace 3mg shots  Check updated vitamin levels  F/u in April scheduled    This was a 20 min apt of which >50% of visit         Recent Labs & Imaging:     POC urine dipstick inaccurate 2/2 recent pyridium use.

## 2020-01-17 NOTE — PROGRESS NOTES
Kris Mccray is a 37 y.o. female (: 1980) presenting to address:    Chief Complaint   Patient presents with   [address]     re establish w/ Dr. Diane Yip Urgency       Vitals:    20 0901   BP: 97/68   Pulse: 68   Resp: 16   Temp: 98.3 °F (36.8 °C)   TempSrc: Oral   SpO2: 100%   Weight: 162 lb (73.5 kg)   Height: 5' 4\" (1.626 m)   PainSc:   6   PainLoc: Vagina       Hearing/Vision:   No exam data present    Learning Assessment:     Learning Assessment 2016   PRIMARY LEARNER Patient   HIGHEST LEVEL OF EDUCATION - PRIMARY LEARNER  4 YEARS OF COLLEGE   BARRIERS PRIMARY LEARNER NONE   CO-LEARNER CAREGIVER No   PRIMARY LANGUAGE ENGLISH   LEARNER PREFERENCE PRIMARY VIDEOS     LISTENING   ANSWERED BY self   RELATIONSHIP SELF     Depression Screening:     3 most recent PHQ Screens 2020   Little interest or pleasure in doing things Not at all   Feeling down, depressed, irritable, or hopeless Not at all   Total Score PHQ 2 0     Fall Risk Assessment:     Fall Risk Assessment, last 12 mths 2019   Able to walk? Yes   Fall in past 12 months? No     Abuse Screening:     Abuse Screening Questionnaire 2019   Do you ever feel afraid of your partner? N   Are you in a relationship with someone who physically or mentally threatens you? N   Is it safe for you to go home? Y     Coordination of Care Questionaire:   1. Have you been to the ER, urgent care clinic since your last visit? Hospitalized since your last visit? NO    2. Have you seen or consulted any other health care providers outside of the [address] [place] [address] since your last visit? Include any pap smears or colon screening. YES 12/10/19 Dr. Hayley Zapata; Dr. Destiny Yadav GI    Advanced Directive:   1. Do you have an Advanced Directive? NO    2. Would you like information on Advanced Directives?  NO

## 2020-01-17 NOTE — PATIENT INSTRUCTIONS
To Do: - let the  know that you need bloodwork on your way out 
- give my business cards to all your specialists and let them know I'm your PCP again Notes from your doctor: 
- future consideration for dermatology, pelvic floor physical therapy TESTING RESULTS Results will be released to [address] Normal results will be sent via mail. If you have questions about your results, please schedule a follow up appointment to discuss with your PCP. MEDICATION REFILLS Please allow at least 2 business days for refill requests to be addressed. Medication refills may be requested through your pharmacy. Refills will not be provided by the after hours/on call provider.

## 2020-01-18 RX ORDER — CIPROFLOXACIN 500 MG/1
500 TABLET ORAL 2 TIMES DAILY
Qty: 20 TAB | Refills: 0 | Status: SHIPPED | OUTPATIENT
Start: 2020-01-18 | End: 2020-01-28

## 2020-01-19 PROBLEM — K59.09 OTHER CONSTIPATION: Status: ACTIVE | Noted: 2020-01-19

## 2020-01-19 LAB
BACTERIA SPEC CULT: ABNORMAL
SERVICE CMNT-IMP: ABNORMAL

## 2020-01-27 ENCOUNTER — OFFICE VISIT (OUTPATIENT)
Dept: FAMILY MEDICINE CLINIC | Age: 40
End: 2020-01-27

## 2020-01-27 VITALS
HEIGHT: 64 IN | BODY MASS INDEX: 27.66 KG/M2 | RESPIRATION RATE: 18 BRPM | WEIGHT: 162 LBS | DIASTOLIC BLOOD PRESSURE: 79 MMHG | SYSTOLIC BLOOD PRESSURE: 117 MMHG | OXYGEN SATURATION: 98 % | TEMPERATURE: 98.6 F | HEART RATE: 75 BPM

## 2020-01-27 DIAGNOSIS — H69.83 EUSTACHIAN TUBE DYSFUNCTION, BILATERAL: Primary | ICD-10-CM

## 2020-01-27 DIAGNOSIS — G40.909 SEIZURE DISORDER (HCC): ICD-10-CM

## 2020-01-27 RX ORDER — FLUTICASONE PROPIONATE 50 MCG
2 SPRAY, SUSPENSION (ML) NASAL DAILY
Qty: 1 BOTTLE | Refills: 2 | Status: SHIPPED | OUTPATIENT
Start: 2020-01-27 | End: 2020-05-04

## 2020-01-27 NOTE — PROGRESS NOTES
[address] [facility]  Primary Care Office Visit - Problem-Oriented    : 1980   Kayleen Olsen is a 37 y.o. female presenting for  Chief Complaint   Patient presents with    Sore Throat     x three days    Cough    Nasal Congestion            Assessment/Plan:     1. Eustachian tube dysfunction, bilateral  Initial encounter. Reassurance given. Start steroid nasal spray, OTC dextromethorphan for cough, and ETD exercises on YouTube. - fluticasone propionate (FLONASE) 50 mcg/actuation nasal spray; 2 Sprays by Both Nostrils route daily. Dispense: 1 Bottle; Refill: 2    2. Seizure disorder (HCC)  Avoiding pseudoephedrine due to lowering sz threshold. Orders & Diagnoses associated with This Encounter:         ICD-10-CM ICD-9-CM   1. Eustachian tube dysfunction, bilateral H69.83 381.81       Orders Placed This Encounter    fluticasone propionate (FLONASE) 50 mcg/actuation nasal spray     Si Sprays by Both Nostrils route daily. Dispense:  1 Bottle     Refill:  2         This document may have been created with the aid of dictation software. Text may contain errors, particularly phonetic errors. Reviewed management plan & instructions with patient, who voiced understanding. Jeannette Chang MD  Internal Medicine, Family Medicine & Sports Medicine  2020    Subjective   History:   Kayleen Olsen is a 37 y.o. female presenting to address:  Chief Complaint   Patient presents with    Sore Throat     x three days    Cough    Nasal Congestion     Scratchy sore throat, with nonproductive cough & nasal congestion. Tried Emergen-C, tea, honey, lemon. Gargle with salt water. cepacol lozenges. Generic flonase, but only used 2 times. Has not taken any other OTC medications since the interaction that lowered her sz threshold in the recent past.    Has appt with [facility] pelvic pain specialist tomorrow. Urinary symptoms improved, but not gone.     Past Medical History:   Diagnosis Date    BV (bacterial vaginosis)     Chronic BV    Depression     Epilepsy [facility]) 2001    Dr. Maximilian Wiseman H/O exercise stress test 11/26/2013    exercise nuc stress test wnl with EF 83%    Hematuria, microscopic 2015    Pelvic floor dysfunction     Recurrent UTI     Seizures (HCC)     Urinary frequency     UTI (urinary tract infection)      Past Surgical History:   Procedure Laterality Date    HX CHOLECYSTECTOMY  2005    HX TUBAL LIGATION  2008    LAP,CHOLECYSTECTOMY        reports that she has never smoked. She has never used smokeless tobacco. She reports previous alcohol use. She reports previous drug use. Social History     Patient does not qualify to have social determinant information on file (likely too young). Social History Narrative    Works at [employer].      Social History     Tobacco Use   Smoking Status Never Smoker   Smokeless Tobacco Never Used     Family History   Problem Relation Age of Onset    Thyroid Disease Mother     Alcohol abuse Father     Thyroid Disease Maternal Aunt     Thyroid Disease Maternal Grandmother     Cataract Paternal Grandmother      Allergies   Allergen Reactions    Iodine Unknown (comments)     Other reaction(s): anaphylaxis/angioedema  Face throat, swelled after CT in 2001, had MRI w/wo Contrast in 2016 with no problem    Sulfa (Sulfonamide Antibiotics) Swelling    Codeine Hives    Iodinated Contrast Media Itching and Swelling    Pecan Nut Other (comments)     Throat and ears itch    Prednisone Other (comments)    Shellfish Derived Swelling    Sudafed [Pseudoephedrine Hcl] Other (comments)     Has seizure when combined w/ antibotic    Sulfamethoxazole-Trimethoprim Unknown (comments)     Stated had seizure r/t an interaction with seizure medication        Problem List:      Patient Active Problem List    Diagnosis    Seizure disorder ([facility] [address]     -- 6/1/2016 [neuro; Dr. Self Aus: request previous record, consider EEG, increase topamax to 150mg BID; f/u 3-4mo    -- 10/7/2014 [neuro; Dr. Nino Nims: continue topamax 100mg BID, B12 1000mcg daily, triptans prn      Other constipation    Endometriosis    Dysmenorrhea    Thyroid nodule    Loose skin    Allergic reaction    Abnormal uterine bleeding (AUB)    Vitamin D deficiency     - VitD 7.9 (L) (1/25/2016); start 50k 2x/wk x 12wks, then 4000 units daily      Frequent UTI    CKD (chronic kidney disease) stage 2, GFR 60-89 ml/min     - eGFR 75 (5/5/2015) [obtained by neurology]      GARCIA (dyspnea on exertion)     -- 2/11/2014: per Dr. Benetta Aase, possible exercise induced asthma. Medications:     Current Outpatient Medications   Medication Sig    fluticasone propionate (FLONASE) 50 mcg/actuation nasal spray 2 Sprays by Both Nostrils route daily.  ciprofloxacin HCl (CIPRO) 500 mg tablet Take 1 Tab by mouth two (2) times a day for 10 days.  topiramate (TOPAMAX) 200 mg tablet Take 200 mg by mouth two (2) times a day.  cholecalciferol, vitamin D3, (VITAMIN D3 PO) Take 1 Tab by mouth daily.  levonorgestrel (MIRENA) 20 mcg/24 hours (5 yrs) 52 mg IUD Mirena 20 mcg/24 hours (5 yrs) 52 mg intrauterine device   Take 1 device by intrauterine route.  cyanocobalamin, vitamin B-12, (VITAMIN B-12) 1,000 mcg/mL drop Take 1-2 Drops by mouth daily. No current facility-administered medications for this visit. Review of Systems:     Review of Systems   Constitutional: Negative for chills and fever. HENT: Positive for congestion, sinus pain and sore throat. Negative for ear pain. Respiratory: Positive for cough. Negative for sputum production, shortness of breath and wheezing. Objective   Physical Assessment:   VS:    Vitals:    01/27/20 1436   BP: 117/79   Pulse: 75   Resp: 18   Temp: 98.6 °F (37 °C)   TempSrc: Oral   SpO2: 98%   Weight: 162 lb (73.5 kg)   Height: 5' 4\" (1.626 m)   PainSc:   2   PainLoc: Throat   LMP: 01/24/2020       Physical Exam  Nursing note reviewed. Constitutional:       Appearance: She is well-developed. She is not diaphoretic. HENT:      Head: Normocephalic and atraumatic. Right Ear: Ear canal and external ear normal. Tympanic membrane has decreased mobility. Left Ear: Ear canal and external ear normal. Tympanic membrane has decreased mobility. Ears:      Comments: Unable to equalize pressure with valsalva     Nose: Nose normal. No nasal deformity. Mouth/Throat:      Lips: No lesions. Mouth: Mucous membranes are moist.      Tongue: No lesions. Pharynx: Oropharynx is clear. Uvula midline. Tonsils: No tonsillar exudate. Comments: + postnasal drip  Neck:      Musculoskeletal: Neck supple. Thyroid: No thyromegaly. Cardiovascular:      Rate and Rhythm: Normal rate and regular rhythm. Heart sounds: No murmur. Pulmonary:      Effort: Pulmonary effort is normal.      Breath sounds: Normal breath sounds. No wheezing or rales. Musculoskeletal: Normal range of motion. Skin:     General: Skin is warm and dry. Neurological:      Mental Status: She is alert and oriented to person, place, and time. Cranial Nerves: No cranial nerve deficit. Coordination: Coordination normal.   Psychiatric:         Behavior: Behavior normal.         Thought Content:  Thought content normal.         Judgment: Judgment normal.

## 2020-01-27 NOTE — PROGRESS NOTES
Rosalia Poe is a 37 y.o. female (: 1980) presenting to address:    Chief Complaint   Patient presents with    Sore Throat     x three days    Cough    Nasal Congestion       Vitals:    20 1436   BP: 117/79   Pulse: 75   Resp: 18   Temp: 98.6 °F (37 °C)   TempSrc: Oral   SpO2: 98%   Weight: 162 lb (73.5 kg)   Height: 5' 4\" (1.626 m)   PainSc:   2   PainLoc: Throat   LMP: 2020       Hearing/Vision:   No exam data present    Learning Assessment:     Learning Assessment 2016   PRIMARY LEARNER Patient   HIGHEST LEVEL OF EDUCATION - PRIMARY LEARNER  4 YEARS OF COLLEGE   BARRIERS PRIMARY LEARNER NONE   CO-LEARNER CAREGIVER No   PRIMARY LANGUAGE ENGLISH   LEARNER PREFERENCE PRIMARY VIDEOS     LISTENING   ANSWERED BY self   RELATIONSHIP SELF     Depression Screening:     3 most recent PHQ Screens 2020   Little interest or pleasure in doing things Not at all   Feeling down, depressed, irritable, or hopeless Not at all   Total Score PHQ 2 0     Fall Risk Assessment:     Fall Risk Assessment, last 12 mths 2019   Able to walk? Yes   Fall in past 12 months? No     Abuse Screening:     Abuse Screening Questionnaire 2019   Do you ever feel afraid of your partner? N   Are you in a relationship with someone who physically or mentally threatens you? N   Is it safe for you to go home? Y     Coordination of Care Questionaire:   1. Have you been to the ER, urgent care clinic since your last visit? Hospitalized since your last visit? NO    2. Have you seen or consulted any other health care providers outside of the [address] [place] [address] since your last visit? Include any pap smears or colon screening. NO    Advanced Directive:   1. Do you have an Advanced Directive? NO    2. Would you like information on Advanced Directives?  NO

## 2020-01-27 NOTE — PATIENT INSTRUCTIONS
To Do: - restart flonase, and use twice daily for 2-3 days, and then decrease back to normal 
- look up YouTube videos about \"eustachian tube exercises\" - for your cough: okay to use dextromethorphan TESTING RESULTS Results will be released to [address] Normal results will be sent via mail. If you have questions about your results, please schedule a follow up appointment to discuss with your PCP. MEDICATION REFILLS Please allow at least 2 business days for refill requests to be addressed. Medication refills may be requested through your pharmacy. Refills will not be provided by the after hours/on call provider. Eustachian Tube Problems: Care Instructions Your Care Instructions The eustachian (say \"you-STAY-shee-un\") tubes run between the inside of the ears and the throat. They keep air pressure stable in the ears. If your eustachian tubes become blocked, the air pressure in your ears changes. The fluids from a cold can clog eustachian tubes, causing pain in the ears. A quick change in air pressure can cause eustachian tubes to close up. This might happen when an airplane changes altitude or when a  goes up or down underwater. Eustachian tube problems often clear up on their own or after antibiotic treatment. If your tubes continue to be blocked, you may need surgery. Follow-up care is a key part of your treatment and safety. Be sure to make and go to all appointments, and call your doctor if you are having problems. It's also a good idea to know your test results and keep a list of the medicines you take. How can you care for yourself at home? · To ease ear pain, apply a warm washcloth or a heating pad set on low. There may be some drainage from the ear when the heat melts earwax. Put a cloth between the heat source and your skin. Do not use a heating pad with children. · If your doctor prescribed antibiotics, take them as directed.  Do not stop taking them just because you feel better. You need to take the full course of antibiotics. · Your doctor may recommend over-the-counter medicine. Be safe with medicines. Oral or nasal decongestants may relieve ear pain. Avoid decongestants that are combined with antihistamines, which tend to cause more blockage. But if allergies seem to be the problem, your doctor may recommend a combination. Be careful with cough and cold medicines. Don't give them to children younger than 6, because they don't work for children that age and can even be harmful. For children 6 and older, always follow all the instructions carefully. Make sure you know how much medicine to give and how long to use it. And use the dosing device if one is included. When should you call for help? Call your doctor now or seek immediate medical care if: 
  · You develop sudden, complete hearing loss.  
  · You have severe pain or feel dizzy.  
  · You have new or increasing pus or blood draining from your ear.  
  · You have redness, swelling, or pain around or behind the ear.  
 Watch closely for changes in your health, and be sure to contact your doctor if: 
  · You do not get better after 2 weeks.  
  · You have any new symptoms, such as itching or a feeling of fullness in the ear. Where can you learn more? Go to http://salome-teresa.info/. Enter Y822 in the search box to learn more about \"Eustachian Tube Problems: Care Instructions. \" Current as of: October 21, 2018 Content Version: 12.2 © 0587-9966 [employer]. Care instructions adapted under license by [employer] (which disclaims liability or warranty for this information). If you have questions about a medical condition or this instruction, always ask your healthcare professional. [address] any warranty or liability for your use of this information.

## 2020-02-12 RX ORDER — CIPROFLOXACIN 500 MG/1
TABLET ORAL
Qty: 20 TAB | Refills: 0 | OUTPATIENT
Start: 2020-02-12

## 2020-02-13 NOTE — PROGRESS NOTES
[address] [facility]  Primary Care Office Visit - Problem-Oriented    : 1980   Jacinta Franks is a 37 y.o. female presenting for  Chief Complaint   Patient presents with    Urinary Frequency     x couple days     Urinary Burning            Assessment/Plan:     1. Dysuria  With completely benign UA today. Will send urine for formal UA with micro & UCx, however advised her that the likelihood of an infection that requires to be treated is quite low. - AMB POC URINALYSIS DIP STICK AUTO W/O MICRO  - URINALYSIS W/MICROSCOPIC; Future  - CULTURE, URINE; Future      2. Abnormal uterine bleeding (AUB) - somewhat improved with placement of mirena  3. Urinary frequency - significant and ongoing, likely 2/2 IC picture   4. Other constipation - s/p GI workup  5. Dysmenorrhea  somewhat improved with placement of mirena  6. Frequent UTI - s/p incomplete urological workup  7. Endometriosis vs adenomyosis vs both? Counseled at great length today. Again discussed the relationship of all things  and GI in the pelvis, and how likely they are interrelated. Strongly encouraged Jacinta Franks to keep upcoming appt with MIS GYN @ [facility]. Provided a significant amt of patient information, including re: endometriosis, interstitial cystitis, urge incontinence, as well as female pelvic anatomy. As she is quite frustrated with her urinary symptoms, did provider her an Rx for PRN oxybutynin, as well as information to read re: the medication if she decides to try. However did specify that this medication is only for symptom relief, and is far from curative. - oxybutynin (DITROPAN) 5 mg tablet; Take 1 Tab by mouth three (3) times daily as needed (urinary frequency). Dispense: 60 Tab; Refill: 1    Spent 25min face-to-face, >50% spent on counseling & patient education.     CC:  Arthur Zuluaga MD (Obstetrics & Gynecology)  Fernando Rutledge MD (Obstetrics & Gynecology)    Orders & Diagnoses associated with This Encounter:         ICD-10-CM ICD-9-CM   1. Dysuria R30.0 788.1   2. Abnormal uterine bleeding (AUB) N93.9 626.9   3. Urinary frequency R35.0 788.41   4. Other constipation K59.09 564.09   5. Dysmenorrhea N94.6 625.3   6. Frequent UTI N39.0 599.0   7. Endometriosis N80.9 617.9       Orders Placed This Encounter    CULTURE, URINE     Standing Status:   Future     Number of Occurrences:   1     Standing Expiration Date:   2/14/2021    URINALYSIS W/MICROSCOPIC     Standing Status:   Future     Standing Expiration Date:   2/14/2021    AMB POC URINALYSIS DIP STICK AUTO W/O MICRO    oxybutynin (DITROPAN) 5 mg tablet     Sig: Take 1 Tab by mouth three (3) times daily as needed (urinary frequency). Dispense:  60 Tab     Refill:  1         This document may have been created with the aid of dictation software. Text may contain errors, particularly phonetic errors. Reviewed management plan & instructions with patient, who voiced understanding. Moises Richmond MD  Internal Medicine, Family Medicine & Sports Medicine  2/14/2020    Subjective   History:   Ade Palafox is a 37 y.o. female presenting to address:  Chief Complaint   Patient presents with    Urinary Frequency     x couple days     Urinary Burning     \"feels like a bladder infection\"   Requested a \"Refill\" of her UTI antibiotics earlier this week via her pharmacy, however request was refused and informed to come for evaluation. No fevers or chills. + frequency & dysuria. Has mirena in place. Had appt with MIS GYN @ [facility], which included a TVUS and reports that she was told that \"my situation is complex, can't really say exactly what it is unless he goes inside (operatively)\"  Has upcoming f/u appt in 1-2 weeks. Is quite frustrated by all of her symptoms. \"I understand that my urine test doesn't show an infection, but how am I supposed to deal with these symptoms? \"  Notes significant urgency during sexual intercourse, to the point that she occasionally needs to stop in the middle to attempt to urinate. Needs to empty bladder prior to sexual intercourse, otherwise cannot achieve orgasm. Has ongoing constipation, and had negative c-scope with Dr. Destiny Yadav Dec 2019. Was sent to urology back in 2016, had initial consult with Dr. Julia Kaminski, but then was lost to follow-up. Notes that the mirena has helped somewhat with the dysmenorrhea symptoms but not with anything else. She is admittedly hesitant re: the idea of an ex lap, and \"doesn't really understand all the stuff that the [facility] people are talking about. \"      DENNY 6 and IIQ 7  2/14/2020 1/22/2016   DENNY 1 Freq urination 1 3   DENNY 2 Leak - urgency 3 3   DENNY 3 Leak - activity 0 2   DENNY 4 Small leak 1 2   DENNY 5 Difficulty empty 1 1   DENNY 6 Pain abd or pelvis 1 2   DENNY 6 Score 39 72   IIQ 1 Affected chores 2 0   IIQ 2 Affected recreation 0 2   IIQ 3 Affected entertainment 2 0   IIQ 4 Affected travel 0 0   IIQ 5 Affected social 3 0   IIQ 6 Affected emotional 0 2   IIQ 7 Frustration 3 3   IIQ 7 Score 48 33             Past Medical History:   Diagnosis Date    BV (bacterial vaginosis)     Chronic BV    Depression     Epilepsy ([address] 2001    Dr. Maximilian Wiseman H/O exercise stress test 11/26/2013    exercise nuc stress test wnl with EF 83%    Hematuria, microscopic 2015    Pelvic floor dysfunction     Recurrent UTI     Seizures ([address]     Urinary frequency     UTI (urinary tract infection)      Past Surgical History:   Procedure Laterality Date    HX CHOLECYSTECTOMY  2005    HX TUBAL LIGATION  2008    LAP,CHOLECYSTECTOMY        reports that she has never smoked. She has never used smokeless tobacco. She reports previous alcohol use. She reports previous drug use. Social History     Patient does not qualify to have social determinant information on file (likely too young). Social History Narrative    Works at [email or web address].      Social History     Tobacco Use   Smoking Status Never Smoker   Smokeless Tobacco Never Used     Family History   Problem Relation Age of Onset    Thyroid Disease Mother     Alcohol abuse Father     Thyroid Disease Maternal Aunt     Thyroid Disease Maternal Grandmother     Cataract Paternal Grandmother      Allergies   Allergen Reactions    Iodine Unknown (comments)     Other reaction(s): anaphylaxis/angioedema  Face throat, swelled after CT in 2001, had MRI w/wo Contrast in 2016 with no problem    Sulfa (Sulfonamide Antibiotics) Swelling    Codeine Hives    Iodinated Contrast Media Itching and Swelling    Pecan Nut Other (comments)     Throat and ears itch    Prednisone Other (comments)    Shellfish Derived Swelling    Sudafed [Pseudoephedrine Hcl] Other (comments)     Has seizure when combined w/ antibotic    Sulfamethoxazole-Trimethoprim Unknown (comments)     Stated had seizure r/t an interaction with seizure medication        Problem List:      Patient Active Problem List    Diagnosis    Seizure disorder ([facility] [address]     -- 6/1/2016 [neuro; Dr. Hank Kirkland: request previous record, consider EEG, increase topamax to 150mg BID; f/u 3-4mo    -- 10/7/2014 [neuro; Dr. Dara Krishnamurthyly: continue topamax 100mg BID, B12 1000mcg daily, triptans prn      Other constipation    Endometriosis    Dysmenorrhea    Thyroid nodule    Loose skin    Allergic reaction    Abnormal uterine bleeding (AUB)    Vitamin D deficiency     - VitD 7.9 (L) (1/25/2016); start 50k 2x/wk x 12wks, then 4000 units daily      Frequent UTI    CKD (chronic kidney disease) stage 2, GFR 60-89 ml/min     - eGFR 75 (5/5/2015) [obtained by neurology]      GARCIA (dyspnea on exertion)     -- 2/11/2014: per Dr. Sonal Duncan, possible exercise induced asthma. Medications:     Current Outpatient Medications   Medication Sig    oxybutynin (DITROPAN) 5 mg tablet Take 1 Tab by mouth three (3) times daily as needed (urinary frequency).     fluticasone propionate (FLONASE) 50 mcg/actuation nasal spray 2 Sprays by Both Nostrils route daily.  topiramate (TOPAMAX) 200 mg tablet Take 200 mg by mouth two (2) times a day.  cholecalciferol, vitamin D3, (VITAMIN D3 PO) Take 1 Tab by mouth daily.  cyanocobalamin, vitamin B-12, (VITAMIN B-12) 1,000 mcg/mL drop Take 1-2 Drops by mouth daily.  levonorgestrel (MIRENA) 20 mcg/24 hours (5 yrs) 52 mg IUD Mirena 20 mcg/24 hours (5 yrs) 52 mg intrauterine device   Take 1 device by intrauterine route. No current facility-administered medications for this visit. Review of Systems:     Review of Systems   Constitutional: Negative for chills and fever. HENT: Negative for ear pain and sore throat. Respiratory: Negative for cough and shortness of breath. Cardiovascular: Negative for chest pain and palpitations. Gastrointestinal: Positive for constipation. Negative for abdominal pain. Genitourinary: Positive for dysuria, frequency and urgency. Negative for flank pain and hematuria. Musculoskeletal: Negative for myalgias. Skin: Negative for rash. Neurological: Negative for speech change, focal weakness and headaches. Endo/Heme/Allergies: Does not bruise/bleed easily. Psychiatric/Behavioral: Negative for depression. The patient is not nervous/anxious and does not have insomnia. Objective   Physical Assessment:   VS:    Vitals:    02/14/20 0913   BP: 122/80   Pulse: 67   Resp: 16   Temp: 97.8 °F (36.6 °C)   TempSrc: Oral   SpO2: 95%   Weight: 163 lb 3.2 oz (74 kg)   Height: 5' 4\" (1.626 m)   PainSc:   0 - No pain   LMP: 01/24/2020       Physical Exam  Nursing note reviewed. Constitutional:       Appearance: Normal appearance. She is well-developed. She is not ill-appearing, toxic-appearing or diaphoretic. HENT:      Head: Normocephalic and atraumatic. Neck:      Musculoskeletal: Neck supple. Thyroid: No thyromegaly. Cardiovascular:      Rate and Rhythm: Normal rate and regular rhythm. Heart sounds: No murmur.    Pulmonary: Effort: Pulmonary effort is normal.      Breath sounds: Normal breath sounds. No wheezing or rales. Abdominal:      Tenderness: There is no right CVA tenderness or left CVA tenderness. Musculoskeletal: Normal range of motion. Skin:     General: Skin is warm and dry. Neurological:      Mental Status: She is alert and oriented to person, place, and time. Cranial Nerves: No cranial nerve deficit. Coordination: Coordination normal.   Psychiatric:         Behavior: Behavior normal.         Thought Content: Thought content normal.         Judgment: Judgment normal.         Recent Labs & Imaging:     Results for Agustin Rodriguez (MRN [id number])    Ref.  Range 2/14/2020 09:29   Color Latest Units:   YELLOW   Appearance Latest Units:   CLEAR   Specific gravity Latest Ref Range: 1.005 - 1.030   1.020   pH (UA) Latest Ref Range: 5.0 - 8.0   5.0   Protein Latest Ref Range: NEG mg/dL NEGATIVE   Glucose Latest Ref Range: NEG mg/dL NEGATIVE   Ketone Latest Ref Range: NEG mg/dL NEGATIVE   Blood Latest Ref Range: NEG   NEGATIVE   Bilirubin Latest Ref Range: NEG   NEGATIVE   Urobilinogen Latest Ref Range: 0.2 - 1.0 EU/dL 0.2   Nitrites Latest Ref Range: NEG   NEGATIVE   Leukocyte Esterase Latest Ref Range: NEG   NEGATIVE

## 2020-02-14 ENCOUNTER — HOSPITAL ENCOUNTER (OUTPATIENT)
Dept: LAB | Age: 40
Discharge: HOME | End: 2020-02-14
Payer: COMMERCIAL

## 2020-02-14 ENCOUNTER — OFFICE VISIT (OUTPATIENT)
Dept: FAMILY MEDICINE CLINIC | Age: 40
End: 2020-02-14

## 2020-02-14 VITALS
RESPIRATION RATE: 16 BRPM | BODY MASS INDEX: 27.86 KG/M2 | OXYGEN SATURATION: 95 % | WEIGHT: 163.2 LBS | HEART RATE: 67 BPM | SYSTOLIC BLOOD PRESSURE: 122 MMHG | DIASTOLIC BLOOD PRESSURE: 80 MMHG | TEMPERATURE: 97.8 F | HEIGHT: 64 IN

## 2020-02-14 DIAGNOSIS — K59.09 OTHER CONSTIPATION: ICD-10-CM

## 2020-02-14 DIAGNOSIS — N39.0 FREQUENT UTI: ICD-10-CM

## 2020-02-14 DIAGNOSIS — R30.0 DYSURIA: Primary | ICD-10-CM

## 2020-02-14 DIAGNOSIS — R30.0 DYSURIA: ICD-10-CM

## 2020-02-14 DIAGNOSIS — N93.9 ABNORMAL UTERINE BLEEDING (AUB): ICD-10-CM

## 2020-02-14 DIAGNOSIS — R35.0 URINARY FREQUENCY: ICD-10-CM

## 2020-02-14 DIAGNOSIS — N94.6 DYSMENORRHEA: ICD-10-CM

## 2020-02-14 DIAGNOSIS — N80.9 ENDOMETRIOSIS: ICD-10-CM

## 2020-02-14 LAB
APPEARANCE UR: CLEAR
BILIRUB UR QL STRIP: NEGATIVE
BILIRUB UR QL: NEGATIVE
COLOR UR: YELLOW
GLUCOSE UR STRIP.AUTO-MCNC: NEGATIVE MG/DL
GLUCOSE UR-MCNC: NEGATIVE MG/DL
HGB UR QL STRIP: NEGATIVE
KETONES P FAST UR STRIP-MCNC: NEGATIVE MG/DL
KETONES UR QL STRIP.AUTO: NEGATIVE MG/DL
LEUKOCYTE ESTERASE UR QL STRIP.AUTO: NEGATIVE
NITRITE UR QL STRIP.AUTO: NEGATIVE
PH UR STRIP: 5 [PH] (ref 5–8)
PH UR STRIP: 5.5 [PH] (ref 4.6–8)
PROT UR QL STRIP: NEGATIVE
PROT UR STRIP-MCNC: NEGATIVE MG/DL
SP GR UR REFRACTOMETRY: 1.02 (ref 1–1.03)
SP GR UR STRIP: 1.02 (ref 1–1.03)
UA UROBILINOGEN AMB POC: NORMAL (ref 0.2–1)
URINALYSIS CLARITY POC: CLEAR
URINALYSIS COLOR POC: YELLOW
URINE BLOOD POC: NEGATIVE
URINE LEUKOCYTES POC: NEGATIVE
URINE NITRITES POC: NEGATIVE
UROBILINOGEN UR QL STRIP.AUTO: 0.2 EU/DL (ref 0.2–1)

## 2020-02-14 PROCEDURE — 81003 URINALYSIS AUTO W/O SCOPE: CPT

## 2020-02-14 PROCEDURE — 87086 URINE CULTURE/COLONY COUNT: CPT

## 2020-02-14 RX ORDER — OXYBUTYNIN CHLORIDE 5 MG/1
5 TABLET ORAL
Qty: 60 TAB | Refills: 1 | Status: SHIPPED | OUTPATIENT
Start: 2020-02-14 | End: 2021-05-18

## 2020-02-14 NOTE — PATIENT INSTRUCTIONS
To Do: - read the info below re: the use of oxybutynin (just treating the   - be sure to tell Dr. Otilio Ohara your symptoms of all things \"in your pelvis\" (including bowel habits, urinary habits, etc)      Notes from your doctor:  - I will send off your urine for further testing, however I'm doubtful that there will be enough bacterial growth to warrant antibiotic treatment when your results were 'so very normal' with in-office testing of your urine today        TESTING RESULTS   Results will be released to [facility]. Normal results will be sent via mail. If you have questions about your results, please schedule a follow up appointment to discuss with your PCP. MEDICATION REFILLS    Please allow at least 2 business days for refill requests to be addressed. Medication refills may be requested through your pharmacy. Refills will not be provided by the after hours/on call provider. Female Reproductive Organs, Side View: Anatomy Sketch    Current as of: February 19, 2019  Content Version: 12.2  © 0300-6287 [employer]. Care instructions adapted under license by [employer] (which disclaims liability or warranty for this information). If you have questions about a medical condition or this instruction, always ask your healthcare professional. [place] [address] any warranty or liability for your use of this information. Oxybutynin (By mouth)   Oxybutynin ([id number]-b-XZG-ti-rosales)  Treats overactive bladder. Brand Name(s): Ditropan XL   There may be other brand names for this medicine. When This Medicine Should Not Be Used: This medicine is not right for everyone. Do not use it if you had an allergic reaction to oxybutynin. How to Use This Medicine:   Liquid, Tablet, Long Acting Tablet  · Take your medicine as directed. Your dose may need to be changed several times to find what works best for you.   · Oral liquid: Measure the oral liquid medicine with a marked measuring spoon, oral syringe, or medicine cup. · Swallow the extended-release tablet whole. Do not crush, break, or chew it. Take this medicine at the same time each day. · If you take the extended-release tablet, part of the tablet may pass into your stools. This is normal and is nothing to worry about. · Missed dose: Take a dose as soon as you remember. If it is almost time for your next dose, wait until then and take a regular dose. Do not take extra medicine to make up for a missed dose. · Store the medicine in a closed container at room temperature, away from heat, moisture, and direct light. Do not freeze the oral liquid. Drugs and Foods to Avoid:   Ask your doctor or pharmacist before using any other medicine, including over-the-counter medicines, vitamins, and herbal products. · Some foods and medicines can affect how oxybutynin works. Tell your doctor if you are using any of the following:  ¨ Metoclopramide  ¨ Bisphosphonate medicine  ¨ Medicine to treat an infection (including clarithromycin, erythromycin, itraconazole, ketoconazole, miconazole)  · Tell your doctor if you use anything else that makes you sleepy. Some examples are allergy medicine, narcotic pain medicine, and alcohol. Warnings While Using This Medicine:   · Tell your doctor if you are pregnant or breastfeeding, or if you have kidney disease, dementia, glaucoma, heart disease, heart rhythm problems, high blood pressure, myasthenia gravis, Parkinson disease, an enlarged prostate or trouble urinating, or stomach or bowel problems (including colitis, chronic constipation, a bowel blockage, GERD). · This medicine may make you dizzy or drowsy, or cause vision problems. Do not drive or do anything else that could be dangerous until you know how this medicine affects you. · This medicine may make you sweat less. This can cause your body to become too hot. Take precautions if you exercise strenuously or are outside in hot weather. Drink plenty of fluids to stay hydrated. · Your doctor will check your progress and the effects of this medicine at regular visits. Keep all appointments. · Keep all medicine out of the reach of children. Never share your medicine with anyone. Possible Side Effects While Using This Medicine:   Call your doctor right away if you notice any of these side effects:  · Allergic reaction: Itching or hives, swelling in your face or hands, swelling or tingling in your mouth or throat, chest tightness, trouble breathing  · Agitation, confusion, unusual behavior or drowsiness, seeing, hearing, or feeling things that are not there  · Change in how much or how often you urinate, difficult or painful urination  · Hot, dry skin, lack of sweating, weakness  If you notice these less serious side effects, talk with your doctor:   · Constipation, diarrhea, nausea, heartburn, stomach pain or upset  · Dry mouth  If you notice other side effects that you think are caused by this medicine, tell your doctor. Call your doctor for medical advice about side effects. You may report side effects to FDA at 9-028-FDA-5672  © 2017 [facility] Information is for End User's use only and may not be sold, redistributed or otherwise used for commercial purposes. The above information is an  only. It is not intended as medical advice for individual conditions or treatments. Talk to your doctor, nurse or pharmacist before following any medical regimen to see if it is safe and effective for you. Urge Incontinence in Women: Care Instructions  Your Care Instructions    Urge incontinence occurs when the need to urinate is so strong that you cannot reach the toilet in time, even when your bladder contains only a small amount of urine. This is also called overactive bladder or unstable bladder. Some women may have no warning before they leak urine. This condition does not cause major health problems.  But it can be embarrassing and can affect a woman's self-esteem and confidence. Treatment can cure or improve your symptoms. Follow-up care is a key part of your treatment and safety. Be sure to make and go to all appointments, and call your doctor if you are having problems. It's also a good idea to know your test results and keep a list of the medicines you take. How can you care for yourself at home? · Be safe with medicines. Take your medicines exactly as prescribed. Call your doctor if you think you are having a problem with your medicine. You will get more details on the specific medicines your doctor prescribes. · Limit caffeine and alcohol. They stimulate urine production. · Urinate every 2 to 4 hours during waking hours, even if you feel that you do not have to go. · Do pelvic floor (Kegel) exercises, which tighten and strengthen pelvic muscles. To do Kegel exercises:  ? Squeeze the same muscles you would use to stop your urine. Your belly and thighs should not move. ? Hold the squeeze for 3 seconds, then relax for 3 seconds. ? Start with 3 seconds. Then add 1 second each week until you are able to squeeze for 10 seconds. ? Repeat the exercise 10 to 15 times for each session. Do three or more sessions each day. · Try wearing pads that absorb the leaks. · Keep skin in the genital area dry. When should you call for help? Call your doctor now or seek immediate medical care if:    · You have new urinary symptoms. These may include leaking urine, having pain when urinating, or feeling like you need to urinate often.    Watch closely for changes in your health, and be sure to contact your doctor if:    · You do not get better as expected. Where can you learn more? Go to http://salome-teresa.info/. Enter B486 in the search box to learn more about \"Urge Incontinence in Women: Care Instructions. \"  Current as of: February 19, 2019  Content Version: 12.2  © 6037-3073 [employer]. Care instructions adapted under license by [employer] (which disclaims liability or warranty for this information). If you have questions about a medical condition or this instruction, always ask your healthcare professional. [address] any warranty or liability for your use of this information. Interstitial Cystitis: Care Instructions  Your Care Instructions    Interstitial cystitis is a long-term irritation of the bladder. It can cause mild to severe pain that comes and goes. You also may feel a sudden urge to urinate or need to urinate often. Sometimes the walls of the bladder become scarred or get stiff. Doctors do not know what causes interstitial cystitis. But they do know that it is not caused by an infection. The problem is much more common in women than in men. Your doctor may do tests to make sure that you do not have an infection, kidney stones, or bladder cancer. Because the cause of interstitial cystitis is not known, your doctor may try several treatments. It may take several weeks or months to find a treatment that works. If diet and lifestyle changes do not help, you may need medicine. Your doctor may also put liquid or medicine into your bladder for a short time to treat the pain. Follow-up care is a key part of your treatment and safety. Be sure to make and go to all appointments, and call your doctor if you are having problems. It's also a good idea to know your test results and keep a list of the medicines you take. How can you care for yourself at home? · Take your medicines exactly as prescribed. Call your doctor if you think you are having a problem with your medicine. · If your doctor prescribed antibiotics, take them as directed. Do not stop taking them just because you feel better. You need to take the full course of antibiotics.   · Avoid eating spicy foods or high-acid foods, such as tomatoes and oranges, if these foods seem to make your pain worse. Also, limit caffeine and alcohol. · If a certain food seems to cause pain in your bladder, stop eating it to see if the pain goes away. · Do not smoke. Smoking can irritate the bladder and cause bladder cancer. If you need help quitting, talk to your doctor about stop-smoking programs and medicines. These can increase your chances of quitting for good. · Try bladder training. Set certain times to go to the bathroom and slowly increase the time between visits. This may help lengthen the time your bladder can hold urine. · You might try a treatment called TENS. It sends a very mild electric current through wires placed near the pubic area. This is done for at least several minutes 2 times each day. · Consider a support group. Sharing your experiences with other people who have the same problem may help you learn more and cope better. · Wash your pubic area with a mild soap. Avoid deodorant soaps or soaps with heavy perfumes. · Wear loose-fitting clothing that does not put pressure on your bladder. When should you call for help? Call your doctor now or seek immediate medical care if:    · You have symptoms of a urinary infection. For example:  ? You have blood or pus in your urine. ? You have pain in your back just below your rib cage. This is called flank pain. ? You have a fever, chills, or body aches. ? It hurts to urinate. ? You have groin or belly pain.    Watch closely for changes in your health, and be sure to contact your doctor if:    · You do not get better as expected. Where can you learn more? Go to http://salome-teresa.info/. Enter R035 in the search box to learn more about \"Interstitial Cystitis: Care Instructions. \"  Current as of: December 19, 2018  Content Version: 12.2  © 9857-2785 [employer]. Care instructions adapted under license by [employer] (which disclaims liability or warranty for this information).  If you have questions about a medical condition or this instruction, always ask your healthcare professional. [place] [address] any warranty or liability for your use of this information.

## 2020-02-14 NOTE — PROGRESS NOTES
Soila Thorpe is a 37 y.o. female (: 1980) presenting to address:    Chief Complaint   Patient presents with    Urinary Frequency     x couple days     Urinary Burning       Vitals:    20 0913   BP: 122/80   Pulse: 67   Resp: 16   Temp: 97.8 °F (36.6 °C)   TempSrc: Oral   SpO2: 95%   Weight: 163 lb 3.2 oz (74 kg)   Height: 5' 4\" (1.626 m)   PainSc:   0 - No pain   LMP: 2020       Hearing/Vision:   No exam data present    Learning Assessment:     Learning Assessment 2016   PRIMARY LEARNER Patient   HIGHEST LEVEL OF EDUCATION - PRIMARY LEARNER  4 YEARS OF COLLEGE   BARRIERS PRIMARY LEARNER NONE   CO-LEARNER CAREGIVER No   PRIMARY LANGUAGE ENGLISH   LEARNER PREFERENCE PRIMARY VIDEOS     LISTENING   ANSWERED BY self   RELATIONSHIP SELF     Depression Screening:     3 most recent PHQ Screens 2020   Little interest or pleasure in doing things Not at all   Feeling down, depressed, irritable, or hopeless Not at all   Total Score PHQ 2 0     Fall Risk Assessment:     Fall Risk Assessment, last 12 mths 2019   Able to walk? Yes   Fall in past 12 months? No     Abuse Screening:     Abuse Screening Questionnaire 2019   Do you ever feel afraid of your partner? N   Are you in a relationship with someone who physically or mentally threatens you? N   Is it safe for you to go home? Y     Coordination of Care Questionaire:   1. Have you been to the ER, urgent care clinic since your last visit? Hospitalized since your last visit? NO    2. Have you seen or consulted any other health care providers outside of the [address] [place] [address] since your last visit? Include any pap smears or colon screening. [place] x 2 weeks ago; scheduled for another visit on 20    Advanced Directive:   1. Do you have an Advanced Directive? NO    2. Would you like information on Advanced Directives?  NO

## 2020-02-16 LAB
BACTERIA SPEC CULT: NORMAL
SERVICE CMNT-IMP: NORMAL

## 2020-03-05 DIAGNOSIS — R35.0 URINARY FREQUENCY: Primary | ICD-10-CM

## 2020-03-05 DIAGNOSIS — R30.0 DYSURIA: ICD-10-CM

## 2020-03-06 ENCOUNTER — HOSPITAL ENCOUNTER (OUTPATIENT)
Dept: LAB | Age: 40
Discharge: HOME | End: 2020-03-06
Payer: COMMERCIAL

## 2020-03-06 DIAGNOSIS — R30.0 DYSURIA: ICD-10-CM

## 2020-03-06 DIAGNOSIS — R35.0 URINARY FREQUENCY: ICD-10-CM

## 2020-03-06 LAB
APPEARANCE UR: ABNORMAL
BACTERIA URNS QL MICRO: ABNORMAL /HPF
BILIRUB UR QL: NEGATIVE
COLOR UR: ABNORMAL
EPITH CASTS URNS QL MICRO: ABNORMAL /LPF (ref 0–5)
GLUCOSE UR STRIP.AUTO-MCNC: NEGATIVE MG/DL
HGB UR QL STRIP: NEGATIVE
KETONES UR QL STRIP.AUTO: NEGATIVE MG/DL
LEUKOCYTE ESTERASE UR QL STRIP.AUTO: ABNORMAL
NITRITE UR QL STRIP.AUTO: POSITIVE
PH UR STRIP: 6 [PH] (ref 5–8)
PROT UR STRIP-MCNC: NEGATIVE MG/DL
SP GR UR REFRACTOMETRY: 1.01 (ref 1–1.03)
UROBILINOGEN UR QL STRIP.AUTO: 1 EU/DL (ref 0.2–1)
WBC URNS QL MICRO: ABNORMAL /HPF (ref 0–4)

## 2020-03-06 PROCEDURE — 87186 SC STD MICRODIL/AGAR DIL: CPT

## 2020-03-06 PROCEDURE — 87077 CULTURE AEROBIC IDENTIFY: CPT

## 2020-03-06 PROCEDURE — 81001 URINALYSIS AUTO W/SCOPE: CPT

## 2020-03-06 PROCEDURE — 87086 URINE CULTURE/COLONY COUNT: CPT

## 2020-03-08 LAB
BACTERIA SPEC CULT: ABNORMAL
SERVICE CMNT-IMP: ABNORMAL

## 2020-03-08 RX ORDER — CIPROFLOXACIN 500 MG/1
500 TABLET ORAL 2 TIMES DAILY
Qty: 20 TAB | Refills: 0 | Status: SHIPPED | OUTPATIENT
Start: 2020-03-08 | End: 2020-06-22

## 2020-04-14 ENCOUNTER — TELEPHONE (OUTPATIENT)
Dept: FAMILY MEDICINE CLINIC | Age: 40
End: 2020-04-14

## 2020-04-14 ENCOUNTER — HOSPITAL ENCOUNTER (OUTPATIENT)
Dept: LAB | Age: 40
Discharge: HOME | End: 2020-04-14
Payer: COMMERCIAL

## 2020-04-14 DIAGNOSIS — R30.0 DYSURIA: ICD-10-CM

## 2020-04-14 DIAGNOSIS — Z20.2 EXPOSURE TO STD: ICD-10-CM

## 2020-04-14 DIAGNOSIS — N39.0 FREQUENT UTI: ICD-10-CM

## 2020-04-14 DIAGNOSIS — N89.8 VAGINAL DISCHARGE: ICD-10-CM

## 2020-04-14 DIAGNOSIS — N89.8 VAGINAL DISCHARGE: Primary | ICD-10-CM

## 2020-04-14 LAB
APPEARANCE UR: CLEAR
BACTERIA URNS QL MICRO: ABNORMAL /HPF
BILIRUB UR QL: NEGATIVE
COLOR UR: YELLOW
EPITH CASTS URNS QL MICRO: ABNORMAL /LPF (ref 0–5)
GLUCOSE UR STRIP.AUTO-MCNC: NEGATIVE MG/DL
HGB UR QL STRIP: NEGATIVE
KETONES UR QL STRIP.AUTO: NEGATIVE MG/DL
LEUKOCYTE ESTERASE UR QL STRIP.AUTO: ABNORMAL
NITRITE UR QL STRIP.AUTO: NEGATIVE
PH UR STRIP: 5 [PH] (ref 5–8)
PROT UR STRIP-MCNC: NEGATIVE MG/DL
RBC #/AREA URNS HPF: NEGATIVE /HPF (ref 0–5)
SP GR UR REFRACTOMETRY: 1.02 (ref 1–1.03)
UROBILINOGEN UR QL STRIP.AUTO: 0.2 EU/DL (ref 0.2–1)
WBC URNS QL MICRO: ABNORMAL /HPF (ref 0–4)

## 2020-04-14 PROCEDURE — 36415 COLL VENOUS BLD VENIPUNCTURE: CPT

## 2020-04-14 PROCEDURE — 86780 TREPONEMA PALLIDUM: CPT

## 2020-04-14 PROCEDURE — 81001 URINALYSIS AUTO W/SCOPE: CPT

## 2020-04-14 PROCEDURE — 87389 HIV-1 AG W/HIV-1&-2 AB AG IA: CPT

## 2020-04-14 PROCEDURE — 87340 HEPATITIS B SURFACE AG IA: CPT

## 2020-04-14 PROCEDURE — 87147 CULTURE TYPE IMMUNOLOGIC: CPT

## 2020-04-14 PROCEDURE — 87798 DETECT AGENT NOS DNA AMP: CPT

## 2020-04-14 PROCEDURE — 87086 URINE CULTURE/COLONY COUNT: CPT

## 2020-04-14 PROCEDURE — 86803 HEPATITIS C AB TEST: CPT

## 2020-04-14 NOTE — TELEPHONE ENCOUNTER
Spoke w/ patient and appt scheduled.     Future Appointments   Date Time Provider Santiago Naqvi   4/14/2020 12:30 PM [facility]   4/17/2020  1:30 PM Avinash Qureshi MD [facility]

## 2020-04-14 NOTE — TELEPHONE ENCOUNTER
Regarding: Non-Urgent Medical Question  ----- Message from Netta Aguilar LPN sent at 1/53/4739  8:34 AM EDT -----       ----- Message from Steve Dickey to Rosy Martinez MD sent at 4/13/2020  8:13 PM -----   Hi Dr Debby Yeager    Im aware that I have a virtual appointment coming up but I had an unexpected situation. My now ex fiancé cheated. I have symptoms of what I think may be std symptoms itching and white discharge. A slight burn but no odor. Im very upset I dont want to deal with this at all and especially during corona. What do I need to do?

## 2020-04-14 NOTE — TELEPHONE ENCOUNTER
Please call patient. Arrange for lab appt for nuswab, urinalysis, urine culture and bloodwork.     Orders Placed This Encounter    CULTURE, URINE    HEP B SURFACE AG    HEPATITIS C AB    HIV 1/2 AG/AB, 4TH GENERATION,W RFLX CONFIRM    T PALLIDUM AB    NUSWAB VAGINITIS PLUS    URINALYSIS W/MICROSCOPIC

## 2020-04-15 LAB
HBV SURFACE AG SER QL: 0.53 INDEX
HBV SURFACE AG SER QL: NEGATIVE
HCV AB SER IA-ACNC: <0.02 INDEX
HCV AB SERPL QL IA: NEGATIVE
HCV COMMENT,HCGAC: NORMAL
HIV 1+2 AB+HIV1 P24 AG SERPL QL IA: NONREACTIVE
HIV12 RESULT COMMENT, HHIVC: NORMAL
T PALLIDUM AB SER QL IA: NONREACTIVE

## 2020-04-16 LAB
BACTERIA SPEC CULT: ABNORMAL
BACTERIA SPEC CULT: ABNORMAL
CC UR VC: ABNORMAL
SERVICE CMNT-IMP: ABNORMAL

## 2020-04-17 ENCOUNTER — VIRTUAL VISIT (OUTPATIENT)
Dept: FAMILY MEDICINE CLINIC | Age: 40
End: 2020-04-17

## 2020-04-17 DIAGNOSIS — F41.0 PANIC ATTACK: ICD-10-CM

## 2020-04-17 DIAGNOSIS — N89.8 VAGINAL DISCHARGE: Primary | ICD-10-CM

## 2020-04-17 DIAGNOSIS — N39.0 FREQUENT UTI: ICD-10-CM

## 2020-04-17 RX ORDER — METRONIDAZOLE 500 MG/1
500 TABLET ORAL 2 TIMES DAILY
Qty: 14 TAB | Refills: 0 | Status: SHIPPED | OUTPATIENT
Start: 2020-04-17 | End: 2020-08-27

## 2020-04-17 NOTE — PROGRESS NOTES
Consent: Eligio Halsted, who was seen by synchronous (real-time) audio-video technology, and/or her healthcare decision maker, is aware that this patient-initiated, Telehealth encounter on 4/17/2020 is a billable service, with coverage as determined by her insurance carrier. She is aware that she may receive a bill and has provided verbal consent to proceed: Yes. This service was provided through telehealth via [email or web address], both the Patient Home and [facility]. Assessment & Plan:     1. Vaginal discharge  Initial encounter. +BV, will treat accordingly. Rest of STD panel negative. - metroNIDAZOLE (FLAGYL) 500 mg tablet; Take 1 Tab by mouth two (2) times a day for 7 days. Dispense: 14 Tab; Refill: 0    2. Panic attack  Initial encounter, related to increase stressors @ work as well as COVID pandemic. Provided info in AVS re: CBT techniques for stress reduction. Discussed potential role of pharmacotherapy. Future considerations: formal pharmacotherapy vs referral to counseling    3. Frequent UTI  Previously assoc with pelvic pain, and dysuria. Was scheduled to have endometriosis surgery with urogyn but was cancelled 2/2 COVID panedmic. Symptoms relatively well controlled currently with PRN oxybutynin. Will be holding off on surgery for now. 712  Subjective:   Eligio Halsted is a 37 y.o. female who was seen for Vaginal Discharge    Pt reports she has not had urinary frequency in the last few weeks. Pt reports she was getting set to have surgery, but the current pandemic has put those decisions on hold. Pt reports severe vaginal irritation. No fevers or chills. Pt also concerned about possible panic attacks due to work deadlines. Reported sx include, heart palpitations, SOB, overheating approx 1 week ago. Pt also c/o difficulty concentrating, chest tightness, irritability, and teeth grinding. Panic attack lasted a few minutes. Never had panic attacks before.   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 Is thinking about looking at other employment opportunities. Prior to Admission medications    Medication Sig Start Date End Date Taking? Authorizing Provider   oxybutynin (DITROPAN) 5 mg tablet Take 1 Tab by mouth three (3) times daily as needed (urinary frequency). 2/14/20   Lewis Torres MD   fluticasone propionate (FLONASE) 50 mcg/actuation nasal spray 2 Sprays by Both Nostrils route daily. 1/27/20   Lewis Torres MD   topiramate (TOPAMAX) 200 mg tablet Take 200 mg by mouth two (2) times a day. 12/10/19   Provider, Historical   cholecalciferol, vitamin D3, (VITAMIN D3 PO) Take 1 Tab by mouth daily. Provider, Historical   cyanocobalamin, vitamin B-12, (VITAMIN B-12) 1,000 mcg/mL drop Take 1-2 Drops by mouth daily. Provider, Historical   levonorgestrel (MIRENA) 20 mcg/24 hours (5 yrs) 52 mg IUD Mirena 20 mcg/24 hours (5 yrs) 52 mg intrauterine device   Take 1 device by intrauterine route.     Provider, Historical     Allergies   Allergen Reactions    Iodine Unknown (comments)     Other reaction(s): anaphylaxis/angioedema  Face throat, swelled after CT in 2001, had MRI w/wo Contrast in 2016 with no problem    Sulfa (Sulfonamide Antibiotics) Swelling    Codeine Hives    Iodinated Contrast Media Itching and Swelling    Pecan Nut Other (comments)     Throat and ears itch    Prednisone Other (comments)    Shellfish Derived Swelling    Sudafed [Pseudoephedrine Hcl] Other (comments)     Has seizure when combined w/ antibotic    Sulfamethoxazole-Trimethoprim Unknown (comments)     Stated had seizure r/t an interaction with seizure medication        Patient Active Problem List    Diagnosis Date Noted    Other constipation 01/19/2020    Endometriosis 05/08/2019    Dysmenorrhea 02/12/2019    Thyroid nodule 02/12/2019    Loose skin 07/27/2016    Allergic reaction 07/27/2016    Abnormal uterine bleeding (AUB) 07/27/2016    Vitamin D deficiency 01/25/2016  Frequent UTI 01/22/2016    CKD (chronic kidney disease) stage 2, GFR 60-89 ml/min 05/13/2015    GARCIA (dyspnea on exertion) 03/03/2014    Seizure disorder ([facility] 11/26/2013     Current Outpatient Medications   Medication Sig Dispense Refill    oxybutynin (DITROPAN) 5 mg tablet Take 1 Tab by mouth three (3) times daily as needed (urinary frequency). 60 Tab 1    fluticasone propionate (FLONASE) 50 mcg/actuation nasal spray 2 Sprays by Both Nostrils route daily. 1 Bottle 2    topiramate (TOPAMAX) 200 mg tablet Take 200 mg by mouth two (2) times a day.  cholecalciferol, vitamin D3, (VITAMIN D3 PO) Take 1 Tab by mouth daily.  cyanocobalamin, vitamin B-12, (VITAMIN B-12) 1,000 mcg/mL drop Take 1-2 Drops by mouth daily.  levonorgestrel (MIRENA) 20 mcg/24 hours (5 yrs) 52 mg IUD Mirena 20 mcg/24 hours (5 yrs) 52 mg intrauterine device   Take 1 device by intrauterine route.        Allergies   Allergen Reactions    Iodine Unknown (comments)     Other reaction(s): anaphylaxis/angioedema  Face throat, swelled after CT in 2001, had MRI w/wo Contrast in 2016 with no problem    Sulfa (Sulfonamide Antibiotics) Swelling    Codeine Hives    Iodinated Contrast Media Itching and Swelling    Pecan Nut Other (comments)     Throat and ears itch    Prednisone Other (comments)    Shellfish Derived Swelling    Sudafed [Pseudoephedrine Hcl] Other (comments)     Has seizure when combined w/ antibotic    Sulfamethoxazole-Trimethoprim Unknown (comments)     Stated had seizure r/t an interaction with seizure medication      Past Medical History:   Diagnosis Date    BV (bacterial vaginosis)     Chronic BV    Depression     Epilepsy ([facility] 2001    Dr. Sheri Montejo H/O exercise stress test 11/26/2013    exercise nuc stress test wnl with EF 83%    Hematuria, microscopic 2015    Pelvic floor dysfunction     Recurrent UTI     Seizures (HCC)     Urinary frequency     UTI (urinary tract infection)      Past Surgical History:   Procedure Laterality Date    HX CHOLECYSTECTOMY  2005    HX TUBAL LIGATION  2008    LAP,CHOLECYSTECTOMY       Family History   Problem Relation Age of Onset    Thyroid Disease Mother     Alcohol abuse Father     Thyroid Disease Maternal Aunt     Thyroid Disease Maternal Grandmother     Cataract Paternal Grandmother      Social History     Tobacco Use    Smoking status: Never Smoker    Smokeless tobacco: Never Used   Substance Use Topics    Alcohol use: Not Currently       Review of Systems   Constitutional: Negative for chills and fever. HENT: Negative for ear pain and sore throat. Respiratory: Negative for cough and shortness of breath. Cardiovascular: Negative for chest pain and palpitations. Gastrointestinal: Negative for abdominal pain. Genitourinary: Negative for dysuria, frequency and urgency. + vaginal discharge   Musculoskeletal: Negative for myalgias. Skin: Negative for rash. Neurological: Negative for speech change, focal weakness and headaches. Endo/Heme/Allergies: Does not bruise/bleed easily. Psychiatric/Behavioral: Negative for depression. The patient is nervous/anxious. The patient does not have insomnia. Objective:   Vital Signs: (As obtained by patient/caregiver at home)  There were no vitals taken for this visit. Physical Exam  Constitutional:       General: She is awake. She is not in acute distress. Appearance: She is not toxic-appearing or diaphoretic. HENT:      Head: Normocephalic and atraumatic. Nose: Nose normal.   Eyes:      General: No scleral icterus. Extraocular Movements: Extraocular movements intact. Conjunctiva/sclera: Conjunctivae normal.   Neck:      Musculoskeletal: Full passive range of motion without pain. Pulmonary:      Effort: Pulmonary effort is normal. No accessory muscle usage or respiratory distress. Skin:     General: Skin is dry. Neurological:      Mental Status: She is alert. Cranial Nerves: No cranial nerve deficit. Psychiatric:         Attention and Perception: Attention and perception normal.         Mood and Affect: Affect normal. Mood is anxious. Speech: Speech normal.         Behavior: Behavior normal. Behavior is cooperative. Cognition and Memory: Cognition and memory normal.           Labs / Results: NuSwab:  Source   Date Value Ref Range Status   04/14/2020 VAGINA   Final     Atopobium vaginae   Date Value Ref Range Status   04/14/2020 SEE COMMENT Score Final     Comment:     (NOTE)  RESULT:High - 2       BVAB 2   Date Value Ref Range Status   04/14/2020 SEE COMMENT Score Final     Comment:     (NOTE)  RESULT:High - 2       Megasphaera 1   Date Value Ref Range Status   04/14/2020 SEE COMMENT Score Final     Comment:     (NOTE)  RESULT:High - 2  Calculate total score by adding the 3 individual bacterial  vaginosis (BV) marker scores together. Total score is  interpreted as follows: Total score 0-1: Indicates the absence of BV. Total score   2: Indeterminate for BV. Additional clinical                  data should be evaluated to establish a                  diagnosis. Total score 3-6: Indicates the presence of BV. This test was developed and its performance characteristics  determined by [employer] [facility] [employer].  It has not been cleared or approved  by the Food and Drug Administration. The FDA has determined  that such clearance or approval is not necessary. C. albicans, TRINO   Date Value Ref Range Status   04/14/2020 Negative Negative   Final     Comment:     (NOTE)  This test was developed and its performance characteristics  determined by [employer]. It has not been cleared or approved  by the Food and Drug Administration.        C. glabrata, TRINO   Date Value Ref Range Status   04/14/2020 Negative Negative   Final     Comment:     (NOTE)  This test was developed and its performance characteristics  determined by [employer] [facility] [employer]. It has not been cleared or approved  by the Food and Drug Administration. Performed At: [address] [id number]  Logan Card MD EW:[id number]       T. vaginalis, TRINO   Date Value Ref Range Status   04/14/2020 Negative Negative   Final     C. trachomatis, TRINO   Date Value Ref Range Status   04/14/2020 Negative Negative   Final     N. gonorrhoeae, TRINO   Date Value Ref Range Status   04/14/2020 Negative Negative   Final     Comment:     (NOTE)  Performed At: [facility] [address] [place] [id number]  Logan Card MD XO:[id number]         HIV:  HIV 1/2 Interpretation   Date Value Ref Range Status   04/14/2020 NONREACTIVE NR   Final       HepB:  Hepatitis B surface Ag   Date Value Ref Range Status   04/14/2020 0.53 <1.00 Index Final     Hep B surface Ag Interp. Date Value Ref Range Status   04/14/2020 Negative NEG   Final       HepC:  Hepatitis C virus Ab   Date Value Ref Range Status   04/14/2020 <0.02 <0.80 Index Final     Hep C  virus Ab Interp. Date Value Ref Range Status   04/14/2020 Negative NEG   Final     Hep C  virus Ab comment   Date Value Ref Range Status   04/14/2020       Final     Comment:     Index <0.80. ......................... Kristen Dye Negative  Index > or = to 0.80 and <1.00. .... Kristen Dye Kristen Dye Equivocal  Index >1.00. ......................... Kristen Dye Positive          For Equivocal or Positive results, confirmation with Hepatitis C RNA by PCR or bDNA is suggested. Syphillis:  T. pallidum interpretation. Date Value Ref Range Status   04/14/2020 NONREACTIVE NR   Final     Comment:     (NOTE)  A nonreactive test result does not exclude the possibility of   exposure to or infection with syphilis. T. pallidum antibodies may be   undetectable in some stages of the infection and in some clinical   conditions. We discussed the expected course, resolution and complications of the diagnosis(es) in detail.   Medication risks, benefits, costs, interactions, and alternatives were discussed as indicated. I advised her to contact the office if her condition worsens, changes or fails to improve as anticipated. She expressed understanding with the diagnosis(es) and plan. Steve Santana is a 37 y.o. female being evaluated by a video visit encounter for concerns as above. A caregiver was present when appropriate. Due to this being a TeleHealth encounter (During NVEFI-01 public health emergency), evaluation of the following organ systems was limited: Vitals/Constitutional/EENT/Resp/CV/GI//MS/Neuro/Skin/Heme-Lymph-Imm. Pursuant to the emergency declaration under the [facility] waiver authority and the [employer] Act, this Virtual  Visit was conducted, with patient's (and/or legal guardian's) consent, to reduce the patient's risk of exposure to COVID-19 and provide necessary medical care. Services were provided through a video synchronous discussion virtually to substitute for in-person clinic visit. Patient was located in her individual home. Physician was located in the office. Written by Ofelia Jeronimo ATC as scribe, as dictated by Dr. Karen Ng. Yonatahn Kamara MD, confirm that all documentation is accurate.       Jeet Crawley MD  Internal Medicine, Family Medicine & Sports Medicine

## 2020-04-17 NOTE — PATIENT INSTRUCTIONS
TESTING RESULTS Results will be released to [address] Normal results will be sent via mail. If you have questions about your results, please schedule a follow up appointment to discuss with your PCP. MEDICATION REFILLS Please allow at least 2 business days for refill requests to be addressed. Medication refills may be requested through your pharmacy. Refills will not be provided by the after hours/on call provider. Cognitive Behavioral Skills You'll Need to Beat Anxiety Five essential skills for overcoming anxiety and getting on with a happy life. 1. The ability to tolerate uncertainty. Studies have shown that intolerance of uncertainty is a key factor in anxiety and depression. People who can't tolerate uncertainty often avoid situations, procrastinate, seek reassurance constantly, delay taking action, do excessive checking, and refuse to delegate. 2. The ability to recognize rumination. Rumination is when you're repeatedly bothered by a worry thought. When people ruminate, their problem solving capacity is reduced. If you're ruminating, it's often best to wait to attempt to problem solve until you can think about the issue without jumping straight into rumination mode. If you can learn to recognize when you're ruminating, you can use cognitive behavioral therapy techniques, defusion techniques, or mindfulness techniques to help you stop ruminating. The best thing you can do when you're ruminating is accept that you're having whatever thoughts you're having, recognize that the thoughts might not be accurate, and allow the thoughts to pass in their own time rather than trying to block them out. Trying to block out distressing thoughts will just cause increased intensity and intrusions of the thoughts you're trying not to have. 3. The ability to recognize thought distortions.  
 
Types of thought distortions include: making excessively negative predictions, underestimating your ability to cope, personalizing, mind reading, catastrophizing, \"shoulds\" and \"musts,\" making judgments of yourself or others that are black and white rather than gray, entitlement thoughts (e.g., thinking that the normal rules shouldn't apply to you), and more. The key is recognizing thought distortions is to ask yourself what thoughts you're having when you feel distressed. Some of these thoughts are likely to be thought distortions. 4. The ability and willingness to use mindfulness techniques. Mindfulness techniques can help reduce anxiety and increase willpower. Practicing mindfulness will help you stop avoidance coping, make better choices even when you're feeling anxious, and help you ruminate less. Try this 10-minute mindful walking exercise to get started. 5. The ability to talk to yourself kindly about your imperfections and mistakes. Criticizing yourself harshly when you make a mistake or when one of your personal imperfections shows up is likely to lead to rumination and avoidance coping. Research has shown that talking to yourself kindly not only helps you feel betterit also increases your motivation for self-improvement. Learning About Generalized Anxiety Disorder What is generalized anxiety disorder? We all worry. It's a normal part of life. But when you have generalized anxiety disorder, you worry about lots of things and have a hard time stopping your worry. This worry or anxiety interferes with your relationships, work, and life. What causes it? The cause is not known. But it may be passed down through families. What are the symptoms? You may feel anxious or worry most days about things like work, relationships, health, or money. You may worry about things that are unlikely to happen. You find it hard to stop or control the worry.  Because you worry a lot and try hard to stop worrying, you may feel restless, tired, tense, or cranky. You may also find it hard to think or sleep. And you may have headaches or an upset stomach. How is it diagnosed? Your doctor will ask about your health and how often you worry or feel anxious. He or she may ask about other symptoms, like whether you: · Feel restless. · Feel tired. · Have a hard time thinking or feel that your mind goes blank. · Feel cranky. · Have tense muscles. · Have sleep problems. A physical exam and tests can help make sure that your symptoms aren't caused by a different condition, such as a thyroid problem. How is it treated? Counseling and medicine can both work to treat anxiety. The two are often used along with lifestyle changes. Cognitive-behavioral therapy (CBT) is a type of counseling that's used to help treat anxiety. In CBT, you learn how to notice and replace thoughts that make you feel worried. It also can help you learn how to relax when you worry. Medicines can help. These medicines are often also used for depression. Selective serotonin reuptake inhibitors (SSRIs) are often tried first. But there are other medicines that your doctor may use. You may need to try a few medicines to find one that works well. Many people feel better by getting regular exercise, eating healthy meals, and getting good sleep. Mindfulnessfocusing on things that happen in the present momentalso can help reduce your anxiety. What can you expect when you have it? Having anxiety can be upsetting. Some people might feel less worried and stressed after a couple of months of treatment. But for other people, it might take longer to feel better. Reaching out to people for help is important. Try not to isolate yourself. Let your family and friends help you. Find someone you can trust and confide in. Talk to that person.  
When you know what anxiety isand how you can get help for ityou can start to learn new ways of thinking. This can help you cope and work through your anxiety. Follow-up care is a key part of your treatment and safety. Be sure to make and go to all appointments, and call your doctor if you are having problems. It's also a good idea to know your test results and keep a list of the medicines you take. Where can you learn more? Go to http://salome-teresa.info/ Enter G110 in the search box to learn more about \"Learning About Generalized Anxiety Disorder. \" Current as of: May 28, 2019Content Version: 12.4 © 5412-0622 [employer]. Care instructions adapted under license by [employer] (which disclaims liability or warranty for this information). If you have questions about a medical condition or this instruction, always ask your healthcare professional. [address] any warranty or liability for your use of this information. Panic Attacks: Care Instructions Your Care Instructions During a panic attack, you may have a feeling of intense fear or terror, trouble breathing, chest pain or tightness, heartbeat changes, dizziness, sweating, and shaking. A panic attack starts suddenly and usually lasts from 5 to 20 minutes but may last even longer. You have the most anxiety about 10 minutes after the attack starts. An attack can begin with a stressful event, or it can happen without a cause. Although panic attacks can cause scary symptoms, you can learn to manage them with self-care, counseling, and medicine. Follow-up care is a key part of your treatment and safety. Be sure to make and go to all appointments, and call your doctor if you are having problems. It's also a good idea to know your test results and keep a list of the medicines you take. How can you care for yourself at home? · Take your medicine exactly as directed. Call your doctor if you think you are having a problem with your medicine. · Go to your counseling sessions and follow-up appointments. · Recognize and accept your anxiety. Then, when you are in a situation that makes you anxious, say to yourself, \"This is not an emergency. I feel uncomfortable, but I am not in danger. I can keep going even if I feel anxious. \" · Be kind to your body: 
? Relieve tension with exercise or a massage. ? Get enough rest. 
? Avoid alcohol, caffeine, nicotine, and illegal drugs. They can increase your anxiety level, cause sleep problems, or trigger a panic attack. ? Learn and do relaxation techniques. See below for more about these techniques. · Engage your mind. Get out and do something you enjoy. Go to a funny movie, or take a walk or hike. Plan your day. Having too much or too little to do can make you anxious. · Keep a record of your symptoms. Discuss your fears with a good friend or family member, or join a support group for people with similar problems. Talking to others sometimes relieves stress. · Get involved in social groups, or volunteer to help others. Being alone sometimes makes things seem worse than they are. · Get at least 30 minutes of exercise on most days of the week to relieve stress. Walking is a good choice. You also may want to do other activities, such as running, swimming, cycling, or playing tennis or team sports. Relaxation techniques Do relaxation exercises for 10 to 20 minutes a day. You can play soothing, relaxing music while you do them, if you wish. · Tell others in your house that you are going to do your relaxation exercises. Ask them not to disturb you. · Find a comfortable place, away from all distractions and noise. · Lie down on your back, or sit with your back straight. · Focus on your breathing. Make it slow and steady. · Breathe in through your nose. Breathe out through either your nose or mouth.  
· Breathe deeply, filling up the area between your navel and your rib cage. Breathe so that your belly goes up and down. · Do not hold your breath. · Breathe like this for 5 to 10 minutes. Notice the feeling of calmness throughout your whole body. As you continue to breathe slowly and deeply, relax by doing the following for another 5 to 10 minutes: · Tighten and relax each muscle group in your body. You can begin at your toes and work your way up to your head. · Imagine your muscle groups relaxing and becoming heavy. · Empty your mind of all thoughts. · Let yourself relax more and more deeply. · Become aware of the state of calmness that surrounds you. · When your relaxation time is over, you can bring yourself back to alertness by moving your fingers and toes and then your hands and feet and then stretching and moving your entire body. Sometimes people fall asleep during relaxation, but they usually wake up shortly afterward. · Always give yourself time to return to full alertness before you drive a car or do anything that might cause an accident if you are not fully alert. Never play a relaxation tape while driving a car. When should you call for help? Call 911 anytime you think you may need emergency care. For example, call if: 
  · You feel you cannot stop from hurting yourself or someone else.  
 Watch closely for changes in your health, and be sure to contact your doctor if: 
  · Your panic attacks get worse.  
  · You have new or different anxiety.  
  · You are not getting better as expected. Where can you learn more? Go to http://salome-teresa.info/ Enter H601 in the search box to learn more about \"Panic Attacks: Care Instructions. \" Current as of: May 28, 2019Content Version: 12.4 © 7226-3760 [employer]. Care instructions adapted under license by [employer] (which disclaims liability or warranty for this information).  If you have questions about a medical condition or this instruction, always ask your healthcare professional. [address] any warranty or liability for your use of this information.

## 2020-04-20 RX ORDER — FLUCONAZOLE 150 MG/1
150 TABLET ORAL DAILY
Qty: 1 TAB | Refills: 1 | Status: SHIPPED | OUTPATIENT
Start: 2020-04-20 | End: 2020-04-21

## 2020-05-18 ENCOUNTER — VIRTUAL VISIT (OUTPATIENT)
Dept: FAMILY MEDICINE CLINIC | Age: 40
End: 2020-05-18

## 2020-05-18 DIAGNOSIS — N89.8 VAGINAL IRRITATION: ICD-10-CM

## 2020-05-18 DIAGNOSIS — F41.9 ANXIETY: Primary | ICD-10-CM

## 2020-05-18 NOTE — PROGRESS NOTES
[address] [place] [address]  Primary Care Office Visit - Telemedicine Problem-Oriented Note    Consent: Alberto Whitehead, who was seen by synchronous (real-time) audio-video technology, and/or her healthcare decision maker, is aware that this patient-initiated, Telehealth encounter on 5/18/2020 is a billable service, with coverage as determined by her insurance carrier. She is aware that she may receive a bill and has provided verbal consent to proceed: Yes. This service was provided through telehealth via [email or web address], both the Patient Home and [address] [place] [address]. .      Assessment & Plan:     1. Anxiety  Ongoing, less of an issue than prior. Has not started the CBT. Sent both CBT suggestions and Personal Lawerance Confer of Rights to patient via [employer] message. Continue coping techniques. Discussed briefly options of medications if she decides pharmacotherapy is needed. Patient is comfortable contacting office if symptoms worsen. 2. Vaginal irritation  Ongoing, recurrent. Discussed discontinuing any \"washes\". Notes she is not currently sexually active. Will do q1wk diflucan x 3 weeks and refraining from douches in attempts to restore normal vulvar / vaginal adrian. - fluconazole (DIFLUCAN) 150 mg tablet; Take 1 Tab by mouth every seven (7) days for 21 days. FDA advises cautious prescribing of oral fluconazole in pregnancy. Indications: treatment to prevent vulvovaginal yeast infection  Dispense: 3 Tab; Refill: 0      Total time: video conference, lab review, chart/note review, med review = [phone]  Subjective:   Alberto Whitehead is a 37 y.o. female who was seen for Anxiety and Urinary Frequency (\"itching\")    Pt c/o abdominal pain 5/10, relates this to her bladder. Pt reports she has not had to take Ditropan recently. Pt reports she was doing better with her son and sisters in town. When her son left, she had a breakdown. Pt states she is trying to avoid medication.    Hasn't been able to find the CBT instructions. She has had some wine with her sisters, but she is getting some bladder irritation. \"I think getting out of the house and focusing on other things did help some. The real test I guess is this week. \"      Prior to Admission medications    Medication Sig Start Date End Date Taking? Authorizing Provider   fluticasone propionate (FLONASE) 50 mcg/actuation nasal spray SHAKE LIQUID AND USE 2 SPRAYS IN EACH NOSTRIL DAILY 5/4/20   Symone Torres MD   oxybutynin (DITROPAN) 5 mg tablet Take 1 Tab by mouth three (3) times daily as needed (urinary frequency). 2/14/20   Lewis Torres MD   topiramate (TOPAMAX) 200 mg tablet Take 200 mg by mouth two (2) times a day. 12/10/19   Provider, Historical   cholecalciferol, vitamin D3, (VITAMIN D3 PO) Take 1 Tab by mouth daily. Provider, Historical   cyanocobalamin, vitamin B-12, (VITAMIN B-12) 1,000 mcg/mL drop Take 1-2 Drops by mouth daily. Provider, Historical   levonorgestrel (MIRENA) 20 mcg/24 hours (5 yrs) 52 mg IUD Mirena 20 mcg/24 hours (5 yrs) 52 mg intrauterine device   Take 1 device by intrauterine route.     Provider, Historical     Allergies   Allergen Reactions    Iodine Unknown (comments)     Other reaction(s): anaphylaxis/angioedema  Face throat, swelled after CT in 2001, had MRI w/wo Contrast in 2016 with no problem    Sulfa (Sulfonamide Antibiotics) Swelling    Codeine Hives    Iodinated Contrast Media Itching and Swelling    Pecan Nut Other (comments)     Throat and ears itch    Prednisone Other (comments)    Shellfish Derived Swelling    Sudafed [Pseudoephedrine Hcl] Other (comments)     Has seizure when combined w/ antibotic    Sulfamethoxazole-Trimethoprim Unknown (comments)     Stated had seizure r/t an interaction with seizure medication        Patient Active Problem List    Diagnosis Date Noted    Other constipation 01/19/2020    Endometriosis 05/08/2019    Dysmenorrhea 02/12/2019    Thyroid nodule 02/12/2019    Loose skin 07/27/2016    Allergic reaction 07/27/2016    Abnormal uterine bleeding (AUB) 07/27/2016    Vitamin D deficiency 01/25/2016    Frequent UTI 01/22/2016    CKD (chronic kidney disease) stage 2, GFR 60-89 ml/min 05/13/2015    GARCIA (dyspnea on exertion) 03/03/2014    Seizure disorder ([facility] [address] 11/26/2013     Current Outpatient Medications   Medication Sig Dispense Refill    fluticasone propionate (FLONASE) 50 mcg/actuation nasal spray SHAKE LIQUID AND USE 2 SPRAYS IN EACH NOSTRIL DAILY 16 g 5    oxybutynin (DITROPAN) 5 mg tablet Take 1 Tab by mouth three (3) times daily as needed (urinary frequency). 60 Tab 1    topiramate (TOPAMAX) 200 mg tablet Take 200 mg by mouth two (2) times a day.  cholecalciferol, vitamin D3, (VITAMIN D3 PO) Take 1 Tab by mouth daily.  cyanocobalamin, vitamin B-12, (VITAMIN B-12) 1,000 mcg/mL drop Take 1-2 Drops by mouth daily.  levonorgestrel (MIRENA) 20 mcg/24 hours (5 yrs) 52 mg IUD Mirena 20 mcg/24 hours (5 yrs) 52 mg intrauterine device   Take 1 device by intrauterine route.        Allergies   Allergen Reactions    Iodine Unknown (comments)     Other reaction(s): anaphylaxis/angioedema  Face throat, swelled after CT in 2001, had MRI w/wo Contrast in 2016 with no problem    Sulfa (Sulfonamide Antibiotics) Swelling    Codeine Hives    Iodinated Contrast Media Itching and Swelling    Pecan Nut Other (comments)     Throat and ears itch    Prednisone Other (comments)    Shellfish Derived Swelling    Sudafed [Pseudoephedrine Hcl] Other (comments)     Has seizure when combined w/ antibotic    Sulfamethoxazole-Trimethoprim Unknown (comments)     Stated had seizure r/t an interaction with seizure medication      Past Medical History:   Diagnosis Date    BV (bacterial vaginosis)     Chronic BV    Depression     Epilepsy ([facility] 2001    Dr. Alejo Gonzalez H/O exercise stress test 11/26/2013    exercise nuc stress test wnl with EF 83%    Hematuria, microscopic 2015    Pelvic floor dysfunction     Recurrent UTI     Seizures (HCC)     Urinary frequency     UTI (urinary tract infection)      Past Surgical History:   Procedure Laterality Date    HX CHOLECYSTECTOMY  2005    HX TUBAL LIGATION  2008    LAP,CHOLECYSTECTOMY       Family History   Problem Relation Age of Onset    Thyroid Disease Mother     Alcohol abuse Father     Thyroid Disease Maternal Aunt     Thyroid Disease Maternal Grandmother     Cataract Paternal Grandmother      Social History     Tobacco Use    Smoking status: Never Smoker    Smokeless tobacco: Never Used   Substance Use Topics    Alcohol use: Not Currently       Review of Systems   Constitutional: Negative for chills and fever. HENT: Negative for ear pain and sore throat. Respiratory: Negative for cough and shortness of breath. Cardiovascular: Negative for chest pain and palpitations. Gastrointestinal: Negative for abdominal pain. Genitourinary: Negative for dysuria, frequency and urgency. \"the itching is starting\"   Musculoskeletal: Negative for myalgias. Skin: Negative for rash. Neurological: Negative for speech change, focal weakness and headaches. Endo/Heme/Allergies: Does not bruise/bleed easily. Psychiatric/Behavioral: Negative for depression. The patient is nervous/anxious. The patient does not have insomnia. Objective:   Vital Signs: (As obtained by patient/caregiver at home)  There were no vitals taken for this visit. Physical Exam  Constitutional:       General: She is awake. She is not in acute distress. Appearance: She is not toxic-appearing or diaphoretic. HENT:      Head: Normocephalic and atraumatic. Nose: Nose normal.   Eyes:      General: No scleral icterus. Extraocular Movements: Extraocular movements intact. Conjunctiva/sclera: Conjunctivae normal.   Neck:      Musculoskeletal: Full passive range of motion without pain.    Pulmonary: Effort: Pulmonary effort is normal. No accessory muscle usage or respiratory distress. Skin:     General: Skin is dry. Neurological:      Mental Status: She is alert. Cranial Nerves: No cranial nerve deficit. Psychiatric:         Attention and Perception: Attention and perception normal.         Mood and Affect: Mood and affect normal.         Speech: Speech normal.         Behavior: Behavior normal. Behavior is cooperative. Cognition and Memory: Cognition and memory normal.                We discussed the expected course, resolution and complications of the diagnosis(es) in detail. Medication risks, benefits, costs, interactions, and alternatives were discussed as indicated. I advised her to contact the office if her condition worsens, changes or fails to improve as anticipated. She expressed understanding with the diagnosis(es) and plan. Lashell Campos is a 37 y.o. female who was evaluated by an audio-video encounter for concerns as above. Patient identification was verified prior to start of the visit. A caregiver was present when appropriate. Due to this being a TeleHealth encounter (During FXREL-97 public health emergency), evaluation of the following organ systems was limited: Vitals/Constitutional/EENT/Resp/CV/GI//MS/Neuro/Skin/Heme-Lymph-Imm. Pursuant to the emergency declaration under the [facility] waiver authority and the [email or web address] and [employer] Act, this Virtual Visit was conducted, with patient's (and/or legal guardian's) consent, to reduce the patient's risk of exposure to COVID-19 and provide necessary medical care. Services were provided through a synchronous discussion virtually to substitute for in-person clinic visit. I was in the office. The patient was at home. Written by Sanjeev Aguirre ATC as scribe, as dictated by Dr. Jorge Martinez.     Edwin Islas MD, confirm that all documentation is accurate.       Carlos Bird MD  Internal Medicine, Family Medicine & Sports Medicine

## 2020-05-19 RX ORDER — FLUCONAZOLE 150 MG/1
150 TABLET ORAL
Qty: 3 TAB | Refills: 0 | Status: SHIPPED | OUTPATIENT
Start: 2020-05-19 | End: 2020-06-12

## 2020-06-12 ENCOUNTER — VIRTUAL VISIT (OUTPATIENT)
Dept: FAMILY MEDICINE CLINIC | Age: 40
End: 2020-06-12

## 2020-06-12 DIAGNOSIS — N89.8 VAGINAL IRRITATION: ICD-10-CM

## 2020-06-12 DIAGNOSIS — F41.9 ANXIETY: Primary | ICD-10-CM

## 2020-06-12 RX ORDER — BUSPIRONE HYDROCHLORIDE 10 MG/1
10 TABLET ORAL 3 TIMES DAILY
Qty: 90 TAB | Refills: 1 | Status: SHIPPED | OUTPATIENT
Start: 2020-06-12 | End: 2021-03-23 | Stop reason: SDUPTHER

## 2020-06-12 RX ORDER — FLUCONAZOLE 150 MG/1
TABLET ORAL
Qty: 3 TAB | Refills: 0 | Status: SHIPPED | OUTPATIENT
Start: 2020-06-12 | End: 2020-08-25 | Stop reason: ALTCHOICE

## 2020-06-12 NOTE — PROGRESS NOTES
[employer]  Primary Care Office Visit - Telemedicine Problem-Oriented Note    Consent: Amado Nguyen, who was seen by synchronous (real-time) audio-video technology, and/or her healthcare decision maker, is aware that this patient-initiated, Telehealth encounter on 6/12/2020 is a billable service, with coverage as determined by her insurance carrier. She is aware that she may receive a bill and has provided verbal consent to proceed: Yes. This service was provided through telehealth via [employer] me, both the Patient Home and [employer]. .      Assessment & Plan:     1. Anxiety  Ongoing, not particularly well controlled. Essentially associated with her occupation. Is interested in pursuing possible medical use marijuana. Prefers not to use scheduled prescription medications. Discussed at length the use of buspirone both scheduled as well as as needed. She is amenable to trying this for now. We will send information regarding the medical marijuana clinics that I am aware of at this time. - busPIRone (BUSPAR) 10 mg tablet; Take 1 Tab by mouth three (3) times daily. Dispense: 90 Tab; Refill: 1    2. Vaginal irritation  Did not complete the weekly Diflucan x3 weeks. Prescription was resent. Total time: video conference, lab review, chart/note review, med review = 25      712  Subjective:   Amado Nguyen is a 37 y.o. female who was seen for Anxiety    Ongoing anxiety, mainly secondary to work. Notes she is having no symptoms of panic, with a panic attack as well. Has discussed with her friends and family, would prefer not to use prescription psychotropic medication. Notes that her sister did not do well on \"all those antidepressants\". Is interested in pursuing medical use marijuana. Accidentally threw out dose numbers 2 and 3 of weekly Diflucan x3 weeks. Notes that the symptoms have recurred.     Prior to Admission medications    Medication Sig Start Date End Date Taking? Authorizing Provider   fluconazole (DIFLUCAN) 150 mg tablet TAKE 1 TABLET BY MOUTH EVERY 7 DAYS FOR 21 DAYS 6/12/20   Symone Torres MD   fluconazole (DIFLUCAN) 150 mg tablet Take 1 Tab by mouth every seven (7) days for 21 days. FDA advises cautious prescribing of oral fluconazole in pregnancy. Indications: treatment to prevent vulvovaginal yeast infection 5/19/20 6/12/20  Symone Torres MD   fluticasone propionate (FLONASE) 50 mcg/actuation nasal spray SHAKE LIQUID AND USE 2 SPRAYS IN EACH NOSTRIL DAILY 5/4/20   Symone Torres MD   oxybutynin (DITROPAN) 5 mg tablet Take 1 Tab by mouth three (3) times daily as needed (urinary frequency). 2/14/20   Yousif Torres MD   topiramate (TOPAMAX) 200 mg tablet Take 200 mg by mouth two (2) times a day. 12/10/19   Provider, Historical   cholecalciferol, vitamin D3, (VITAMIN D3 PO) Take 1 Tab by mouth daily. Provider, Historical   cyanocobalamin, vitamin B-12, (VITAMIN B-12) 1,000 mcg/mL drop Take 1-2 Drops by mouth daily. Provider, Historical   levonorgestrel (MIRENA) 20 mcg/24 hours (5 yrs) 52 mg IUD Mirena 20 mcg/24 hours (5 yrs) 52 mg intrauterine device   Take 1 device by intrauterine route.     Provider, Historical     Allergies   Allergen Reactions    Iodine Unknown (comments)     Other reaction(s): anaphylaxis/angioedema  Face throat, swelled after CT in 2001, had MRI w/wo Contrast in 2016 with no problem    Sulfa (Sulfonamide Antibiotics) Swelling    Codeine Hives    Iodinated Contrast Media Itching and Swelling    Pecan Nut Other (comments)     Throat and ears itch    Prednisone Other (comments)    Shellfish Derived Swelling    Sudafed [Pseudoephedrine Hcl] Other (comments)     Has seizure when combined w/ antibotic    Sulfamethoxazole-Trimethoprim Unknown (comments)     Stated had seizure r/t an interaction with seizure medication        Patient Active Problem List    Diagnosis Date Noted    Seizure disorder ([facility] [address] 11/26/2013     Priority: 2 - Two    Other constipation 01/19/2020    Endometriosis 05/08/2019    Dysmenorrhea 02/12/2019    Thyroid nodule 02/12/2019    Loose skin 07/27/2016    Allergic reaction 07/27/2016    Abnormal uterine bleeding (AUB) 07/27/2016    Vitamin D deficiency 01/25/2016    Frequent UTI 01/22/2016    CKD (chronic kidney disease) stage 2, GFR 60-89 ml/min 05/13/2015    GARCIA (dyspnea on exertion) 03/03/2014     Past Medical History:   Diagnosis Date    BV (bacterial vaginosis)     Chronic BV    Depression     Epilepsy ([facility] [address] 2001    Dr. Kacy Sherman H/O exercise stress test 11/26/2013    exercise nuc stress test wnl with EF 83%    Hematuria, microscopic 2015    Pelvic floor dysfunction     Recurrent UTI     Seizures (HCC)     Urinary frequency     UTI (urinary tract infection)      Past Surgical History:   Procedure Laterality Date    HX CHOLECYSTECTOMY  2005    HX TUBAL LIGATION  2008    LAP,CHOLECYSTECTOMY       Family History   Problem Relation Age of Onset    Thyroid Disease Mother     Alcohol abuse Father     Thyroid Disease Maternal Aunt     Thyroid Disease Maternal Grandmother     Cataract Paternal Grandmother      Social History     Tobacco Use    Smoking status: Never Smoker    Smokeless tobacco: Never Used   Substance Use Topics    Alcohol use: Not Currently       Review of Systems   Constitutional: Negative for chills and fever. HENT: Negative for ear pain and sore throat. Respiratory: Negative for cough and shortness of breath. Cardiovascular: Negative for chest pain and palpitations. Gastrointestinal: Negative for abdominal pain. Genitourinary: Negative for dysuria, frequency and urgency. \"the itching is starting\"   Musculoskeletal: Negative for myalgias. Skin: Negative for rash. Neurological: Negative for speech change, focal weakness and headaches. Endo/Heme/Allergies: Does not bruise/bleed easily.    Psychiatric/Behavioral: Negative for depression. The patient is nervous/anxious. The patient does not have insomnia. Objective:   Vital Signs: (As obtained by patient/caregiver at home)  There were no vitals taken for this visit. Physical Exam  Constitutional:       General: She is awake. She is not in acute distress. Appearance: She is not toxic-appearing or diaphoretic. HENT:      Head: Normocephalic and atraumatic. Nose: Nose normal.   Eyes:      General: No scleral icterus. Extraocular Movements: Extraocular movements intact. Conjunctiva/sclera: Conjunctivae normal.   Neck:      Musculoskeletal: Full passive range of motion without pain. Pulmonary:      Effort: Pulmonary effort is normal. No accessory muscle usage or respiratory distress. Skin:     General: Skin is dry. Neurological:      Mental Status: She is alert. Cranial Nerves: No cranial nerve deficit. Psychiatric:         Attention and Perception: Attention and perception normal.         Mood and Affect: Mood and affect normal.         Speech: Speech normal.         Behavior: Behavior normal. Behavior is cooperative. Cognition and Memory: Cognition and memory normal.                We discussed the expected course, resolution and complications of the diagnosis(es) in detail. Medication risks, benefits, costs, interactions, and alternatives were discussed as indicated. I advised her to contact the office if her condition worsens, changes or fails to improve as anticipated. She expressed understanding with the diagnosis(es) and plan. Afshin Dumont is a 37 y.o. female who was evaluated by an audio-video encounter for concerns as above. Patient identification was verified prior to start of the visit. A caregiver was present when appropriate.  Due to this being a TeleHealth encounter (During EOYSK-61 public health emergency), evaluation of the following organ systems was limited: Vitals/Constitutional/EENT/Resp/CV/GI//MS/Neuro/Skin/Heme-Lymph-Imm. Pursuant to the emergency declaration under the [address] [place] [address], [facility] waiver authority and the [employer] and [employer] Act, this Virtual Visit was conducted, with patient's (and/or legal guardian's) consent, to reduce the patient's risk of exposure to COVID-19 and provide necessary medical care. Services were provided through a synchronous discussion virtually to substitute for in-person clinic visit. I was in the office. The patient was at home.       Tiffany Monroy MD  Internal Medicine, Family Medicine & Sports Medicine

## 2020-06-22 RX ORDER — CIPROFLOXACIN 500 MG/1
TABLET ORAL
Qty: 20 TAB | Refills: 0 | Status: SHIPPED | OUTPATIENT
Start: 2020-06-22 | End: 2020-08-25 | Stop reason: ALTCHOICE

## 2020-06-26 DIAGNOSIS — R30.0 DYSURIA: Primary | ICD-10-CM

## 2020-08-05 ENCOUNTER — PATIENT MESSAGE (OUTPATIENT)
Dept: FAMILY MEDICINE CLINIC | Age: 40
End: 2020-08-05

## 2020-08-05 NOTE — TELEPHONE ENCOUNTER
From: Liseth Rodriguez  To: Elizabeth Finney MD  Sent: 8/5/2020 11:52 AM EDT  Subject: Non-Urgent Medical Question    Hi Dr Milo Dugan     If I am still having panic attacks and headaches. I am wondering could I request short term disability from my employer? I have an appointment with my neurologist on next Tuesday regarding the migraines and headaches. I am going to start looking for another job soon but the job is too stressful. Im just trying to look at all options.

## 2020-08-06 ENCOUNTER — VIRTUAL VISIT (OUTPATIENT)
Dept: FAMILY MEDICINE CLINIC | Age: 40
End: 2020-08-06

## 2020-08-06 DIAGNOSIS — F41.0 PANIC ATTACKS: ICD-10-CM

## 2020-08-06 DIAGNOSIS — F41.8 ANXIOUS DEPRESSION: Primary | ICD-10-CM

## 2020-08-06 DIAGNOSIS — F51.04 PSYCHOPHYSIOLOGICAL INSOMNIA: ICD-10-CM

## 2020-08-06 RX ORDER — SERTRALINE HYDROCHLORIDE 25 MG/1
25 TABLET, FILM COATED ORAL DAILY
Qty: 30 TAB | Refills: 2 | Status: SHIPPED | OUTPATIENT
Start: 2020-08-06 | End: 2020-10-13

## 2020-08-06 NOTE — PROGRESS NOTES
[facility]  Primary Care Office Visit - Telemedicine Problem-Oriented Note    Consent: Charles Johnson, who was seen by synchronous (real-time) audio-video technology, and/or her healthcare decision maker, is aware that this patient-initiated, Telehealth encounter on 8/6/2020 is a billable service, with coverage as determined by her insurance carrier. She is aware that she may receive a bill and has provided verbal consent to proceed: Yes. This service was provided through telehealth via [employer] me, both the Patient Home and [facility]. .      Assessment & Plan:       ICD-10-CM ICD-9-CM   1. 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 Psychophysiological insomnia  F51.04 307.42   3. Panic attacks  F41.0 300.01       Orders Placed This Encounter    sertraline (ZOLOFT) 25 mg tablet     Sig: Take 1 Tab by mouth daily. Dispense:  30 Tab     Refill:  2       Significantly worsened anxiety & depression since last visit. Daily symptoms, affecting work performance and ability to interact with others. Also affecting sleep. This is the 4th visit in the last 4 months addressing this issue. Finally amenable to starting SSRI      > start sertraline 12.5 to 25mg daily. Counseled that we need to plan on committing to this pharmacotherapy for at least 4-6mo, and that it does take about 4-6 weeks to assess efficacy of this treatment. > strongly recommending counseling / therapy  > change buspirone to PRN  > close follow-up     Patient denies SI/HI        Total time: video conference, lab review, chart/note review, med review, completion of paperwork = 40min      712  Subjective:   Charles Johnson is a 37 y.o. female who was seen for Anxiety    Last VV: 6/12/2020    Still thinking about looking for another job. Spoke with her mom last night, which helped her calm down a bit. 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   \"even when I talk about the situation, I feel sharp pains in her chest\". Been with this company x 5 years. HOLLY 2/7 8/6/2020   Feeling nervous, anxious or on edge? 3   Not being able to stop or control worrying? 2   HOLLY-2 Subtotal 5   Worrying too much about different things? 2   Trouble relaxing? 3   Being so restless that it is hard to sit still? 0   Becoming easily annoyed or irritable? 2   Feeling afraid as if something awful might happen? 1   HOLLY-7 Total Score 13   If you checked off any problems, how difficult have these problems made it for you to do your work, take care of thinks at home, or get along with other people? Somewhat difficult       3 most recent PHQ Screens 8/6/2020   Little interest or pleasure in doing things Nearly every day   Feeling down, depressed, irritable, or hopeless Nearly every day   Total Score PHQ 2 6   Trouble falling or staying asleep, or sleeping too much Nearly every day   Feeling tired or having little energy Nearly every day   Poor appetite, weight loss, or overeating Nearly every day   Feeling bad about yourself - or that you are a failure or have let yourself or your family down Nearly every day   Trouble concentrating on things such as school, work, reading, or watching TV Nearly every day   Moving or speaking so slowly that other people could have noticed; or the opposite being so fidgety that others notice Several days   Thoughts of being better off dead, or hurting yourself in some way Not at all   PHQ 9 Score 22   How difficult have these problems made it for you to do your work, take care of your home and get along with others Somewhat difficult         CSSRS 8/6/2020   1) Within the past month, have you wished you were dead or wished you could go to sleep and not wake up? No   2) Have you actually had any thoughts of killing yourself? No   6) Have you ever done anything, started to do anything, or prepared to do anything to end your life?  No           Prior to Admission medications    Medication Sig Start Date End Date Taking? Authorizing Provider   ciprofloxacin HCl (CIPRO) 500 mg tablet TAKE 1 TABLET BY MOUTH TWICE DAILY FOR 10 DAYS 6/22/20   Symone Torres MD   fluconazole (DIFLUCAN) 150 mg tablet TAKE 1 TABLET BY MOUTH EVERY 7 DAYS FOR 21 DAYS 6/12/20   Symone Torres MD   busPIRone (BUSPAR) 10 mg tablet Take 1 Tab by mouth three (3) times daily. 6/12/20   Keisha Torres MD   fluticasone propionate (FLONASE) 50 mcg/actuation nasal spray SHAKE LIQUID AND USE 2 SPRAYS IN EACH NOSTRIL DAILY 5/4/20   Symone Torres MD   oxybutynin (DITROPAN) 5 mg tablet Take 1 Tab by mouth three (3) times daily as needed (urinary frequency). 2/14/20   Keisha Torres MD   topiramate (TOPAMAX) 200 mg tablet Take 200 mg by mouth two (2) times a day. 12/10/19   Provider, Historical   cholecalciferol, vitamin D3, (VITAMIN D3 PO) Take 1 Tab by mouth daily. Provider, Historical   cyanocobalamin, vitamin B-12, (VITAMIN B-12) 1,000 mcg/mL drop Take 1-2 Drops by mouth daily. Provider, Historical   levonorgestrel (MIRENA) 20 mcg/24 hours (5 yrs) 52 mg IUD Mirena 20 mcg/24 hours (5 yrs) 52 mg intrauterine device   Take 1 device by intrauterine route.     Provider, Historical     Allergies   Allergen Reactions    Iodine Unknown (comments)     Other reaction(s): anaphylaxis/angioedema  Face throat, swelled after CT in 2001, had MRI w/wo Contrast in 2016 with no problem    Sulfa (Sulfonamide Antibiotics) Swelling    Codeine Hives    Iodinated Contrast Media Itching and Swelling    Pecan Nut Other (comments)     Throat and ears itch    Prednisone Other (comments)    Shellfish Derived Swelling    Sudafed [Pseudoephedrine Hcl] Other (comments)     Has seizure when combined w/ antibotic    Sulfamethoxazole-Trimethoprim Unknown (comments)     Stated had seizure r/t an interaction with seizure medication        Patient Active Problem List    Diagnosis Date Noted    Seizure disorder ([facility] [address] 11/26/2013 Priority: 2 - Two    Other constipation 01/19/2020    Endometriosis 05/08/2019    Dysmenorrhea 02/12/2019    Thyroid nodule 02/12/2019    Loose skin 07/27/2016    Allergic reaction 07/27/2016    Abnormal uterine bleeding (AUB) 07/27/2016    Vitamin D deficiency 01/25/2016    Frequent UTI 01/22/2016    CKD (chronic kidney disease) stage 2, GFR 60-89 ml/min 05/13/2015    GARCIA (dyspnea on exertion) 03/03/2014     Past Medical History:   Diagnosis Date    BV (bacterial vaginosis)     Chronic BV    Depression     Epilepsy ([address] 2001    Dr. Vilma Bonner H/O exercise stress test 11/26/2013    exercise nuc stress test wnl with EF 83%    Hematuria, microscopic 2015    Pelvic floor dysfunction     Recurrent UTI     Seizures (HCC)     Urinary frequency     UTI (urinary tract infection)      Past Surgical History:   Procedure Laterality Date    HX CHOLECYSTECTOMY  2005    HX TUBAL LIGATION  2008    LAP,CHOLECYSTECTOMY       Family History   Problem Relation Age of Onset    Thyroid Disease Mother     Alcohol abuse Father     Thyroid Disease Maternal Aunt     Thyroid Disease Maternal Grandmother     Cataract Paternal Grandmother      Social History     Tobacco Use    Smoking status: Never Smoker    Smokeless tobacco: Never Used   Substance Use Topics    Alcohol use: Not Currently       Review of Systems   Constitutional: Negative for chills and fever. HENT: Negative for ear pain and sore throat. Respiratory: Negative for cough and shortness of breath. Cardiovascular: Negative for chest pain and palpitations. Gastrointestinal: Negative for abdominal pain. Genitourinary: Negative for dysuria. Musculoskeletal: Negative for myalgias. Skin: Negative for rash. Neurological: Negative for speech change, focal weakness and headaches. Endo/Heme/Allergies: Does not bruise/bleed easily. Psychiatric/Behavioral: Positive for depression. Negative for suicidal ideas.  The patient is nervous/anxious and has insomnia. Objective:   Vital Signs: (As obtained by patient/caregiver at home)  There were no vitals taken for this visit. Physical Exam  Constitutional:       General: She is awake. She is not in acute distress. Appearance: She is not toxic-appearing or diaphoretic. HENT:      Head: Normocephalic and atraumatic. Nose: Nose normal.   Eyes:      General: No scleral icterus. Extraocular Movements: Extraocular movements intact. Conjunctiva/sclera: Conjunctivae normal.   Neck:      Musculoskeletal: Full passive range of motion without pain. Pulmonary:      Effort: Pulmonary effort is normal. No accessory muscle usage or respiratory distress. Skin:     General: Skin is dry. Neurological:      Mental Status: She is alert. Cranial Nerves: No cranial nerve deficit. Psychiatric:         Attention and Perception: Attention and perception normal.         Mood and Affect: Mood is anxious. Affect is tearful. Speech: Speech normal.         Behavior: Behavior normal. Behavior is cooperative. Thought Content: Thought content normal. Thought content does not include homicidal or suicidal ideation. Thought content does not include homicidal or suicidal plan. Cognition and Memory: Cognition and memory normal.              We discussed the expected course, resolution and complications of the diagnosis(es) in detail. Medication risks, benefits, costs, interactions, and alternatives were discussed as indicated. I advised her to contact the office if her condition worsens, changes or fails to improve as anticipated. She expressed understanding with the diagnosis(es) and plan. Charles Johnson is a 37 y.o. female who was evaluated by an audio-video encounter for concerns as above. Patient identification was verified prior to start of the visit. A caregiver was present when appropriate.  Due to this being a TeleHealth encounter (During COVID-19 public health emergency), evaluation of the following organ systems was limited: Vitals/Constitutional/EENT/Resp/CV/GI//MS/Neuro/Skin/Heme-Lymph-Imm. Pursuant to the emergency declaration under the [address] [place] [address], [facility] waiver authority and the [employer] Act, this Virtual Visit was conducted, with patient's (and/or legal guardian's) consent, to reduce the patient's risk of exposure to COVID-19 and provide necessary medical care. Services were provided through a synchronous discussion virtually to substitute for in-person clinic visit. I was in the office. The patient was at home.       Abimael Ford MD  Internal Medicine, Family Medicine & Sports Medicine

## 2020-08-06 NOTE — PATIENT INSTRUCTIONS
To Do: 
-  
 
 
Notes from your doctor: -  
 
 
TESTING RESULTS Results will be released to [address] Normal results will be sent via mail. If you have questions about your results, please schedule a follow up appointment to discuss with your PCP. MEDICATION REFILLS Please allow at least 2 business days for refill requests to be addressed. Medication refills may be requested through your pharmacy. Refills will not be provided by the after hours/on call provider.

## 2020-08-08 PROBLEM — F41.8 ANXIOUS DEPRESSION: Status: ACTIVE | Noted: 2020-08-08

## 2020-08-08 PROBLEM — F41.0 PANIC ATTACKS: Status: ACTIVE | Noted: 2020-08-08

## 2020-08-08 PROBLEM — F51.04 PSYCHOPHYSIOLOGICAL INSOMNIA: Status: ACTIVE | Noted: 2020-08-08

## 2020-08-11 ENCOUNTER — TELEPHONE (OUTPATIENT)
Dept: FAMILY MEDICINE CLINIC | Age: 40
End: 2020-08-11

## 2020-08-11 NOTE — TELEPHONE ENCOUNTER
Spoke w/ and got most of the information needed; pt is scheduled for f/u w/PCP on 8/25/2020; pt will call back w/appt scheduled for psychiatry; Pt called back the initial consultation for psyche eval is for 8/27/2020.

## 2020-08-11 NOTE — TELEPHONE ENCOUNTER
Regarding: FW:instructions after 06 Aug 2020 virtual visit  Contact: Harley Yu Dean -    I left paperwork on your desk that needs this info. .. so here ya go.  :)    ----- Message -----  From: Jani Orlando LPN  Sent: 7/33/3785   1:38 PM EDT  To: Cookie Marcelino MD  Subject: RE:instructions after 06 Aug 2020 virtual vi#    ----- Message from Laura Tamez LPN sent at 5/39/6042  1:38 PM EDT -----       ----- Message from Scotty Zelaya to Ramila Conti MD sent at 8/10/2020  1:27 PM -----   Thank you I picked it up this morning. The paperwork has been faxed to your office to be completed. I have my leave date as 8/20/20 because there can be a delay with paperwork. Thanks again.      ----- Message -----       Claudette Jennings MD       Sent:8/6/2020 10:49 AM EDT         To:Mely Duff    Subject:instructions after 06 Aug 2020 virtual visit    - start sertraline (zoloft) 12.5mg (1/2 tablet) daily, and then after 6-8 days, increase to 25mg daily  - look into counseling / psychotherapy, and once you get that set up, please MyChart msg me the details (so we can put it in your documentation)  - use the buspirone as needed  - when you figure out the details re: your leave / Thanh Bonner / short-term disability, please let me know via [employer] job doing the work to take care of YOU.   You Got This.    ~ Dr. Mando Betts

## 2020-08-11 NOTE — TELEPHONE ENCOUNTER
Patient is following up on paperwork that was sent in last week to see if Dr. Irvin Moreira has completed.

## 2020-08-11 NOTE — TELEPHONE ENCOUNTER
----- Message from Liz Casas MD sent at 8/8/2020  5:06 PM EDT -----  Regarding: paperwork  So I filled out as much as I could over the weekend. I left tabs where stuff still needs to be filled out. We need to know:  - is she planning on a \"continuous leave\"? (I think she is)  - what date is she (or did she) stop working?  - does she have a plan re: when she is going to try to go back (which determines her expected return to work date)? - her next telemedicine visit appt with me      If you think you can fill out all the blank spots once we get this info, then you can send it off. Otherwise, it can wait for me to come back. ALSO. .. Can you call her and let her know that \"Melissa looked at your paperwork, and it looks like your leave management people are looking for you to be able to provide information re: seeing a formal counselor and/or psychiatrist... so you should start working on that and be able to give us the information when you have it scheduled, etc\". Thanks!

## 2020-08-12 ENCOUNTER — TELEPHONE (OUTPATIENT)
Dept: FAMILY MEDICINE CLINIC | Age: 40
End: 2020-08-12

## 2020-08-14 NOTE — TELEPHONE ENCOUNTER
Patient LVM asking if FMLA form has been faxed; states last day of leave is 8/19/2020; would like faxed to: [phone]; pt's call back #[phone].

## 2020-08-25 ENCOUNTER — VIRTUAL VISIT (OUTPATIENT)
Dept: FAMILY MEDICINE CLINIC | Age: 40
End: 2020-08-25

## 2020-08-25 DIAGNOSIS — F51.04 PSYCHOPHYSIOLOGICAL INSOMNIA: ICD-10-CM

## 2020-08-25 DIAGNOSIS — F41.0 PANIC ATTACKS: ICD-10-CM

## 2020-08-25 DIAGNOSIS — F41.8 ANXIOUS DEPRESSION: Primary | ICD-10-CM

## 2020-08-25 NOTE — Clinical Note
Pls send her a MyChart msg with the follow up VV30 appt details. Aim for 9/17 through 9/21. Sushma Barber!

## 2020-08-25 NOTE — PROGRESS NOTES
[facility]  Primary Care Office Visit - Telemedicine Problem-Oriented Note    Consent: Carissa Lowry, who was seen by synchronous (real-time) audio-video technology, and/or her healthcare decision maker, is aware that this patient-initiated, Telehealth encounter on 8/25/2020 is a billable service, with coverage as determined by her insurance carrier. She is aware that she may receive a bill and has provided verbal consent to proceed: Yes. This service was provided through telehealth via [employer] me, both the Patient Home and [facility]. Assessment & Plan:       ICD-10-CM ICD-9-CM   1. 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 Psychophysiological insomnia  F51.04 307.42   3. Panic attacks  F41.0 300.01        Now currently on FMLA - continuous leave as of 8/20/2020. Improvement in PHQ 9 seen today, and patient endorses no panic attacks since the start of her leave. Has pending psychiatry evaluation today.    > Continue sertraline 12.5 mg daily  > Keep follow-up appointment with psychiatry  > Close follow-up    Return to work date - pending psychiatry input    No orders of the defined types were placed in this encounter. Total time: video conference, lab review, chart/note review, med review = 25min    Follow-up and Dispositions    · Return in about 3 weeks (around 9/15/2020) for 30min telemedicine. Future Appointments   Date Time Provider Santiago Naqvi   9/18/2020  1:00 PM Dodie Torres MD [facility] MARIE SCHED           712  Subjective:   Carissa Lowry is a 37 y.o. female who was seen for Anxiety    Last visit: 8/6/2020    Last day of work was 8/19/2020. Has not had any panic attacks since the last day of work. States that she started the Zoloft 12.5 mg on 8/20/2020. Denies any adverse effects, and reports good compliance. Has not been using the buspirone.     Notes that the days leading up to her leave were quite stressful, however she is able to endorse the fact that the act of training (others on her workload) was a positive experience. Interpersonal relations with her supervisor did seem to worsen leading up to her last day. Note she does have an appointment with psychiatry today. At this time, the duration of her leave is undefined. Is thinking about looking for other employment. HOLLY 2/7 8/25/2020 8/6/2020   Feeling nervous, anxious or on edge? 2 3   Not being able to stop or control worrying? 3 2   HOLLY-2 Subtotal 5 5   Worrying too much about different things? 3 2   Trouble relaxing? 2 3   Being so restless that it is hard to sit still? 1 0   Becoming easily annoyed or irritable? 1 2   Feeling afraid as if something awful might happen? 1 1   HOLLY-7 Total Score 13 13   If you checked off any problems, how difficult have these problems made it for you to do your work, take care of thinks at home, or get along with other people?   Very difficult Somewhat difficult       3 most recent PHQ Screens 8/25/2020   Little interest or pleasure in doing things Several days   Feeling down, depressed, irritable, or hopeless Several days   Total Score PHQ 2 2   Trouble falling or staying asleep, or sleeping too much More than half the days   Feeling tired or having little energy Several days   Poor appetite, weight loss, or overeating Several days   Feeling bad about yourself - or that you are a failure or have let yourself or your family down More than half the days   Trouble concentrating on things such as school, work, reading, or watching TV Several days   Moving or speaking so slowly that other people could have noticed; or the opposite being so fidgety that others notice Several days   Thoughts of being better off dead, or hurting yourself in some way Not at all   PHQ 9 Score 10   How difficult have these problems made it for you to do your work, take care of your home and get along with others Very difficult       CSSRS 8/25/2020 8/6/2020   1) Within the past month, have you wished you were dead or wished you could go to sleep and not wake up? No No   2) Have you actually had any thoughts of killing yourself? No No   6) Have you ever done anything, started to do anything, or prepared to do anything to end your life? No No           Prior to Admission medications    Medication Sig Start Date End Date Taking? Authorizing Provider   sertraline (ZOLOFT) 25 mg tablet Take 1 Tab by mouth daily. Patient taking differently: Take 12.5 mg by mouth daily. 8/6/20  Yes Kaia Beyer MD   fluticasone propionate (FLONASE) 50 mcg/actuation nasal spray SHAKE LIQUID AND USE 2 SPRAYS IN EACH NOSTRIL DAILY 5/4/20  Yes Symone Torres MD   topiramate (TOPAMAX) 200 mg tablet Take 200 mg by mouth two (2) times a day. 12/10/19  Yes Provider, Historical   levonorgestrel (MIRENA) 20 mcg/24 hours (5 yrs) 52 mg IUD Mirena 20 mcg/24 hours (5 yrs) 52 mg intrauterine device   Take 1 device by intrauterine route. Yes Provider, Historical   busPIRone (BUSPAR) 10 mg tablet Take 1 Tab by mouth three (3) times daily. 6/12/20   Yaw Torres MD   oxybutynin (DITROPAN) 5 mg tablet Take 1 Tab by mouth three (3) times daily as needed (urinary frequency). 2/14/20   Yaw Torres MD   cholecalciferol, vitamin D3, (VITAMIN D3 PO) Take 1 Tab by mouth daily. Provider, Historical   cyanocobalamin, vitamin B-12, (VITAMIN B-12) 1,000 mcg/mL drop Take 1-2 Drops by mouth daily.     Provider, Historical     Allergies   Allergen Reactions    Iodine Unknown (comments)     Other reaction(s): anaphylaxis/angioedema  Face throat, swelled after CT in 2001, had MRI w/wo Contrast in 2016 with no problem    Sulfa (Sulfonamide Antibiotics) Swelling    Codeine Hives    Iodinated Contrast Media Itching and Swelling    Pecan Nut Other (comments)     Throat and ears itch    Prednisone Other (comments)    Shellfish Derived Swelling    Sudafed [Pseudoephedrine Hcl] Other (comments)     Has seizure when combined w/ antibotic    Sulfamethoxazole-Trimethoprim Unknown (comments)     Stated had seizure r/t an interaction with seizure medication        Patient Active Problem List    Diagnosis Date Noted    Seizure disorder ([facility] 11/26/2013     Priority: 2 - Two    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    Psychophysiological insomnia 08/08/2020    Panic attacks 08/08/2020    Other constipation 01/19/2020    Endometriosis 05/08/2019    Dysmenorrhea 02/12/2019    Thyroid nodule 02/12/2019    Loose skin 07/27/2016    Allergic reaction 07/27/2016    Abnormal uterine bleeding (AUB) 07/27/2016    Vitamin D deficiency 01/25/2016    Frequent UTI 01/22/2016    CKD (chronic kidney disease) stage 2, GFR 60-89 ml/min 05/13/2015    GARCIA (dyspnea on exertion) 03/03/2014     Past Medical History:   Diagnosis Date    BV (bacterial vaginosis)     Chronic BV    Depression     Epilepsy ([facility] 2001    Dr. Bjorn Bush H/O exercise stress test 11/26/2013    exercise nuc stress test wnl with EF 83%    Hematuria, microscopic 2015    Pelvic floor dysfunction     Recurrent UTI     Seizures (HCC)     Urinary frequency     UTI (urinary tract infection)      Past Surgical History:   Procedure Laterality Date    HX CHOLECYSTECTOMY  2005    HX TUBAL LIGATION  2008    LAP,CHOLECYSTECTOMY       Family History   Problem Relation Age of Onset    Thyroid Disease Mother     Alcohol abuse Father     Thyroid Disease Maternal Aunt     Thyroid Disease Maternal Grandmother     Cataract Paternal Grandmother      Social History     Tobacco Use    Smoking status: Never Smoker    Smokeless tobacco: Never Used   Substance Use Topics    Alcohol use: Not Currently       Review of Systems   Constitutional: Negative for chills and fever. HENT: Negative for ear pain and sore throat. Respiratory: Negative for cough and shortness of breath. Cardiovascular: Negative for chest pain and palpitations.    Gastrointestinal: Negative for abdominal pain. Genitourinary: Negative for dysuria. Musculoskeletal: Negative for myalgias. Skin: Negative for rash. Neurological: Negative for speech change, focal weakness and headaches. Endo/Heme/Allergies: Does not bruise/bleed easily. Psychiatric/Behavioral: Positive for depression. Negative for suicidal ideas. The patient is nervous/anxious and has insomnia. Objective:   Vital Signs: (As obtained by patient/caregiver at home)  There were no vitals taken for this visit. Physical Exam  Constitutional:       General: She is awake. She is not in acute distress. Appearance: She is not toxic-appearing or diaphoretic. HENT:      Head: Normocephalic and atraumatic. Nose: Nose normal.   Eyes:      General: No scleral icterus. Extraocular Movements: Extraocular movements intact. Conjunctiva/sclera: Conjunctivae normal.   Neck:      Musculoskeletal: Full passive range of motion without pain. Pulmonary:      Effort: Pulmonary effort is normal. No accessory muscle usage or respiratory distress. Skin:     General: Skin is dry. Neurological:      Mental Status: She is alert. Cranial Nerves: No cranial nerve deficit. Psychiatric:         Attention and Perception: Attention and perception normal.         Mood and Affect: Mood is anxious. Speech: Speech normal.         Behavior: Behavior normal. Behavior is cooperative. Thought Content: Thought content normal. Thought content does not include homicidal or suicidal ideation. Thought content does not include homicidal or suicidal plan. Cognition and Memory: Cognition and memory normal.                We discussed the expected course, resolution and complications of the diagnosis(es) in detail. Medication risks, benefits, costs, interactions, and alternatives were discussed as indicated.   I advised her to contact the office if her condition worsens, changes or fails to improve as anticipated. She expressed understanding with the diagnosis(es) and plan. Mikael Goodman is a 37 y.o. female who was evaluated by an audio-video encounter for concerns as above. Patient identification was verified prior to start of the visit. A caregiver was present when appropriate. Due to this being a TeleHealth encounter (During [facility]-75 public health emergency), evaluation of the following organ systems was limited: Vitals/Constitutional/EENT/Resp/CV/GI//MS/Neuro/Skin/Heme-Lymph-Imm. Pursuant to the emergency declaration under the [address] [place] [address], [address]5 waiver authority and the [employer] and [employer] Act, this Virtual Visit was conducted, with patient's (and/or legal guardian's) consent, to reduce the patient's risk of exposure to COVID-19 and provide necessary medical care. Services were provided through a synchronous discussion virtually to substitute for in-person clinic visit. I was in the office. The patient was at home.       Jean-Paul Olmstead MD  Internal Medicine, Family Medicine & Sports Medicine

## 2020-08-26 DIAGNOSIS — N89.8 VAGINAL IRRITATION: ICD-10-CM

## 2020-08-27 DIAGNOSIS — N89.8 VAGINAL DISCHARGE: ICD-10-CM

## 2020-08-27 RX ORDER — FLUCONAZOLE 150 MG/1
TABLET ORAL
Qty: 3 TAB | Refills: 1 | Status: SHIPPED | OUTPATIENT
Start: 2020-08-27 | End: 2020-12-07

## 2020-08-27 RX ORDER — METRONIDAZOLE 500 MG/1
TABLET ORAL
Qty: 14 TAB | Refills: 0 | Status: SHIPPED | OUTPATIENT
Start: 2020-08-27 | End: 2020-09-18

## 2020-09-04 ENCOUNTER — TELEPHONE (OUTPATIENT)
Dept: FAMILY MEDICINE CLINIC | Age: 40
End: 2020-09-04

## 2020-09-04 NOTE — TELEPHONE ENCOUNTER
Patient called stating that she took her last dose of Diflucan on Wednesday along with her Zoloft and she ended up waking up in the middle of the night with a headache and light headedness however went back to sleep. When she woke up Thursday those symptoms were gone however she had a sore throat. This morning she look at her throat and has white patches on the left side and feels swollen. Patient is concerned about an allergic reaction to the medication. I did inform her it sounds like strep throat however she states she has not really been in contact with anyone. Please advise.

## 2020-09-04 NOTE — TELEPHONE ENCOUNTER
Likely not an allergic reaction, as that would be a very odd and rare presentation for that. Sounds more like pharyngitis. .. whether or not it is from [employer]" or another bacteria, or a virus. .. impossible to tell without testing. Would recommend warm salt water gargles, and tylenol / ibuprofen as needed and monitoring. If worsens, may consider seeing urgent care for evaluation / throat swab (we cannot perform that in our office). Thanks.

## 2020-09-18 ENCOUNTER — VIRTUAL VISIT (OUTPATIENT)
Dept: FAMILY MEDICINE CLINIC | Age: 40
End: 2020-09-18

## 2020-09-18 DIAGNOSIS — F41.8 ANXIOUS DEPRESSION: Primary | ICD-10-CM

## 2020-09-18 DIAGNOSIS — R45.4 IRRITABILITY AND ANGER: ICD-10-CM

## 2020-09-18 DIAGNOSIS — F51.04 PSYCHOPHYSIOLOGICAL INSOMNIA: ICD-10-CM

## 2020-09-18 DIAGNOSIS — F41.0 PANIC ATTACKS: ICD-10-CM

## 2020-09-18 NOTE — PROGRESS NOTES
[address] [place] [address]  Primary Care Office Visit - Telemedicine Problem-Oriented Note    Consent: Yessenia Wetzel, who was seen by synchronous (real-time) audio-video technology, and/or her healthcare decision maker, is aware that this patient-initiated, Telehealth encounter on 9/18/2020 is a billable service, with coverage as determined by her insurance carrier. She is aware that she may receive a bill and has provided verbal consent to proceed: Yes. This service was provided through telehealth via [employer] me, both the Patient Home and [facility]. Assessment & Plan:       ICD-10-CM ICD-9-CM   1. 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 Psychophysiological insomnia  F51.04 307.42   3. Panic attacks  F41.0 300.01   4. Irritability and anger  R45.4 799.22       No change to current regimen of zoloft 25mg daily. (on this dose for ~2wks at this point). Strongly encourage ongoing counseling / therapy. Returning to work at this time would likely result in significant discord between herself and her colleagues/supervisor due to her inability to conduct herself professionally at this time. Follow-up and Dispositions    · Return in 25 days (on 10/13/2020) for 30min telemedicine. Total time: video conference, chart/note review, med review, paperwork completion = 40min      712  Subjective:   Yessenia Wetzel is a 37 y.o. female who was seen for Anxiety and Depression    Last VV: 8/25/2020    As directed, has been going to counseling. Nicola Yip  Has had 3 visits thus far. Next appt on the 2nd of October. \"some things she said, like 'you don't have to put on an act in front of me for your paperwork' made me quite pissed off. Why would she say something like that? At least I didn't give her a piece of my mind right then. \"    Zoloft has decreased panic attacks, and increased ability to deal with stress (\"an ex- interaction that wasn't as angry as I could have been\"), but still having issues with being irritable, with clenched teeth and being fidgety. Since disability was denied, insomnia significantly worsened, mainly with sleep maintenance - waking 2x/night. Improved relationship with her daughter, age 15. \"I think my anger and emotions with work really strained our relationship. \"  Notes her BF, her mother and her  are all supportive of her current endeavors. Still struggling to read and focus, even for things for pleasure. HOLLY 2/7 9/18/2020 8/25/2020   Feeling nervous, anxious or on edge? 1 2   Not being able to stop or control worrying? 2 3   HOLLY-2 Subtotal 3 5   Worrying too much about different things? 1 3   Trouble relaxing? 1 2   Being so restless that it is hard to sit still? 2 1   Becoming easily annoyed or irritable? 0 1   Feeling afraid as if something awful might happen? 0 1   HOLLY-7 Total Score 7 13   If you checked off any problems, how difficult have these problems made it for you to do your work, take care of thinks at home, or get along with other people?   Somewhat difficult Very difficult       3 most recent PHQ Screens 9/18/2020   Little interest or pleasure in doing things Several days   Feeling down, depressed, irritable, or hopeless Several days   Total Score PHQ 2 2   Trouble falling or staying asleep, or sleeping too much Several days   Feeling tired or having little energy Several days   Poor appetite, weight loss, or overeating Nearly every day   Feeling bad about yourself - or that you are a failure or have let yourself or your family down Not at all   Trouble concentrating on things such as school, work, reading, or watching TV Nearly every day   Moving or speaking so slowly that other people could have noticed; or the opposite being so fidgety that others notice Several days   Thoughts of being better off dead, or hurting yourself in some way Not at all   PHQ 9 Score 11   How difficult have these problems made it for you to do your work, take care of your home and get along with others Extremely difficult         Prior to Admission medications    Medication Sig Start Date End Date Taking? Authorizing Provider   fluconazole (DIFLUCAN) 150 mg tablet TAKE 1 TABLET BY MOUTH EVERY 7 DAYS FOR 21 DAYS 8/27/20  Yes Kaia Beyer MD   sertraline (ZOLOFT) 25 mg tablet Take 1 Tab by mouth daily. 8/6/20  Yes Kaia Beyer MD   fluticasone propionate (FLONASE) 50 mcg/actuation nasal spray SHAKE LIQUID AND USE 2 SPRAYS IN EACH NOSTRIL DAILY 5/4/20  Yes Symone Torres MD   topiramate (TOPAMAX) 200 mg tablet Take 200 mg by mouth two (2) times a day. 12/10/19  Yes Provider, Historical   cholecalciferol, vitamin D3, (VITAMIN D3 PO) Take 1 Tab by mouth daily. Yes Provider, Historical   cyanocobalamin, vitamin B-12, (VITAMIN B-12) 1,000 mcg/mL drop Take 1-2 Drops by mouth daily. Yes Provider, Historical   levonorgestrel (MIRENA) 20 mcg/24 hours (5 yrs) 52 mg IUD Mirena 20 mcg/24 hours (5 yrs) 52 mg intrauterine device   Take 1 device by intrauterine route. Yes Provider, Historical   busPIRone (BUSPAR) 10 mg tablet Take 1 Tab by mouth three (3) times daily. 6/12/20   Yaw Torres MD   oxybutynin (DITROPAN) 5 mg tablet Take 1 Tab by mouth three (3) times daily as needed (urinary frequency).  2/14/20   Kaia Beyer MD     Allergies   Allergen Reactions    Iodine Unknown (comments)     Other reaction(s): anaphylaxis/angioedema  Face throat, swelled after CT in 2001, had MRI w/wo Contrast in 2016 with no problem    Sulfa (Sulfonamide Antibiotics) Swelling    Codeine Hives    Iodinated Contrast Media Itching and Swelling    Pecan Nut Other (comments)     Throat and ears itch    Prednisone Other (comments)    Shellfish Derived Swelling    Sudafed [Pseudoephedrine Hcl] Other (comments)     Has seizure when combined w/ antibotic    Sulfamethoxazole-Trimethoprim Unknown (comments)     Stated had seizure r/t an interaction with seizure medication        Patient Active Problem List    Diagnosis Date Noted    Seizure disorder ([facility] 11/26/2013     Priority: 2 - Two    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    Psychophysiological insomnia 08/08/2020    Panic attacks 08/08/2020    Other constipation 01/19/2020    Endometriosis 05/08/2019    Dysmenorrhea 02/12/2019    Thyroid nodule 02/12/2019    Loose skin 07/27/2016    Allergic reaction 07/27/2016    Abnormal uterine bleeding (AUB) 07/27/2016    Vitamin D deficiency 01/25/2016    Frequent UTI 01/22/2016    CKD (chronic kidney disease) stage 2, GFR 60-89 ml/min 05/13/2015    GARCIA (dyspnea on exertion) 03/03/2014     Past Medical History:   Diagnosis Date    BV (bacterial vaginosis)     Chronic BV    Depression     Epilepsy ([facility] 2001    Dr. Nayak Ahr H/O exercise stress test 11/26/2013    exercise nuc stress test wnl with EF 83%    Hematuria, microscopic 2015    Pelvic floor dysfunction     Recurrent UTI     Seizures (HCC)     Urinary frequency     UTI (urinary tract infection)      Past Surgical History:   Procedure Laterality Date    HX CHOLECYSTECTOMY  2005    HX TUBAL LIGATION  2008    LAP,CHOLECYSTECTOMY       Family History   Problem Relation Age of Onset    Thyroid Disease Mother     Alcohol abuse Father     Thyroid Disease Maternal Aunt     Thyroid Disease Maternal Grandmother     Cataract Paternal Grandmother      Social History     Tobacco Use    Smoking status: Never Smoker    Smokeless tobacco: Never Used   Substance Use Topics    Alcohol use: Not Currently       Review of Systems   Constitutional: Negative for chills and fever. HENT: Negative for ear pain and sore throat. Respiratory: Negative for cough and shortness of breath. Cardiovascular: Negative for chest pain and palpitations. Gastrointestinal: Negative for abdominal pain. Genitourinary: Negative for dysuria. Musculoskeletal: Negative for myalgias.    Skin: Negative for rash. Neurological: Negative for speech change, focal weakness and headaches. Endo/Heme/Allergies: Does not bruise/bleed easily. Psychiatric/Behavioral: Positive for depression. Negative for suicidal ideas. The patient is nervous/anxious and has insomnia. Objective:   Vital Signs: (As obtained by patient/caregiver at home)  There were no vitals taken for this visit. Physical Exam  Constitutional:       General: She is awake. She is not in acute distress. Appearance: She is not toxic-appearing or diaphoretic. HENT:      Head: Normocephalic and atraumatic. Nose: Nose normal.   Eyes:      General: No scleral icterus. Extraocular Movements: Extraocular movements intact. Conjunctiva/sclera: Conjunctivae normal.   Neck:      Musculoskeletal: Full passive range of motion without pain. Pulmonary:      Effort: Pulmonary effort is normal. No accessory muscle usage or respiratory distress. Skin:     General: Skin is dry. Neurological:      Mental Status: She is alert. Cranial Nerves: No cranial nerve deficit. Psychiatric:         Attention and Perception: Attention and perception normal.         Mood and Affect: Mood is anxious. Affect is angry. Speech: Speech normal.         Behavior: Behavior normal. Behavior is cooperative. Thought Content: Thought content normal. Thought content does not include homicidal or suicidal ideation. Thought content does not include homicidal or suicidal plan. Cognition and Memory: Cognition and memory normal.              We discussed the expected course, resolution and complications of the diagnosis(es) in detail. Medication risks, benefits, costs, interactions, and alternatives were discussed as indicated. I advised her to contact the office if her condition worsens, changes or fails to improve as anticipated. She expressed understanding with the diagnosis(es) and plan. Sri Bautista is a 37 y.o. female who was evaluated by an audio-video encounter for concerns as above. Patient identification was verified prior to start of the visit. A caregiver was present when appropriate. Due to this being a TeleHealth encounter (During [facility] public health emergency), evaluation of the following organ systems was limited: Vitals/Constitutional/EENT/Resp/CV/GI//MS/Neuro/Skin/Heme-Lymph-Imm. Pursuant to the emergency declaration under the [address] [place] [address], [facility] waiver authority and the [employer] and [employer] Act, this Virtual Visit was conducted, with patient's (and/or legal guardian's) consent, to reduce the patient's risk of exposure to COVID-19 and provide necessary medical care. Services were provided through a synchronous discussion virtually to substitute for in-person clinic visit. I was in the office. The patient was at home.       Abimael Ford MD  Internal Medicine, Family Medicine & Sports Medicine

## 2020-10-02 ENCOUNTER — TELEPHONE (OUTPATIENT)
Dept: FAMILY MEDICINE CLINIC | Age: 40
End: 2020-10-02

## 2020-10-02 NOTE — TELEPHONE ENCOUNTER
Patient called stating that the  with her disability claim called and informed her that there was not enough specific details provided and she only has 5 more days to get it corrected or case will be closed. Asked her what questions need to be more specific patient states she was unsure. Also asked were they speaking of the form Dr. Blossom Fallon did or the form her therapist did.  Patient was unsure about that as well and states she will speak with her therapist today when she has her appointment because  did mention something about therapy notes so maybe they were speaking of form from therapist.

## 2020-10-06 NOTE — TELEPHONE ENCOUNTER
Ms James Moore pt's mental health therapist called to speak to Dr Suri Pham. She states she needs to speak to her to coordinate some care.  Please return her call at [phone]

## 2020-10-06 NOTE — TELEPHONE ENCOUNTER
Spoke with Kelsey Yin' therapist, Milka Shipley at great length. We seem to be in agreement of our assessment of Mely Duff' mental health, and that work is a contributing factor. Neither she nor I have any information as to why the most recent request for disability qualification was denied. Carmen Arita and I will continue to collaborate as long as Kelsey Yin gives permission to do so. I informed Carmen Arita that I can pass along to Mely that at this point, there is no actionable items for us to address at this time for her disability claim, and if/when she gets details re: the denial, it would be in her best interest to get that documentation to myself and Carmen Arita to see if anything was left out in the administrative process. It may be, however, that Kelsey Yin' clinical picture may not meet her employer's criteria for disability coverage, which is not something Carmen Arita nor I can control.

## 2020-10-13 ENCOUNTER — VIRTUAL VISIT (OUTPATIENT)
Dept: FAMILY MEDICINE CLINIC | Age: 40
End: 2020-10-13
Payer: COMMERCIAL

## 2020-10-13 DIAGNOSIS — F51.04 PSYCHOPHYSIOLOGICAL INSOMNIA: Primary | ICD-10-CM

## 2020-10-13 DIAGNOSIS — F41.8 ANXIOUS DEPRESSION: ICD-10-CM

## 2020-10-13 DIAGNOSIS — R45.4 IRRITABILITY AND ANGER: ICD-10-CM

## 2020-10-13 DIAGNOSIS — F41.0 PANIC ATTACKS: ICD-10-CM

## 2020-10-13 PROCEDURE — 99214 OFFICE O/P EST MOD 30 MIN: CPT | Performed by: FAMILY MEDICINE

## 2020-10-13 RX ORDER — SERTRALINE HYDROCHLORIDE 50 MG/1
50 TABLET, FILM COATED ORAL DAILY
Qty: 30 TAB | Refills: 2 | Status: SHIPPED | OUTPATIENT
Start: 2020-10-13 | End: 2020-10-27 | Stop reason: SDUPTHER

## 2020-10-13 NOTE — PROGRESS NOTES
[address] [place] [address]  Primary Care Office Visit - Telemedicine Problem-Oriented Note    Consent: Dayanna De La Fuente, who was seen by synchronous (real-time) audio-video technology, and/or her healthcare decision maker, is aware that this patient-initiated, Telehealth encounter on 10/13/2020 is a billable service, with coverage as determined by her insurance carrier. She is aware that she may receive a bill and has provided verbal consent to proceed: Yes. This service was provided through telehealth via [employer] me, both the Patient Home and [facility]. Assessment & Plan:       ICD-10-CM ICD-9-CM   1. Psychophysiological insomnia  F51.04 307.42   2. 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 Panic attacks  F41.0 300.01   4. Irritability and anger  R45.4 799.22         Ongoing. Did note some benefit from recent trial of PRN buspirone. > amenable to increasing zoloft from 25mg to 50mg  > encouraged ongoing PRN use of buspirone, maybe consider taking \"earlier, when she feels the anxiety building up, rather than after having several panic attacks\"  > encouraged her to continue to investigate other options for employment/income, as it seems as though her current position no longer serves her as it should.  >       Orders Placed This Encounter    sertraline (ZOLOFT) 50 mg tablet     Sig: Take 1 Tab by mouth daily. Dispense:  30 Tab     Refill:  2     Future Appointments   Date Time Provider Santiago Donya   10/27/2020 10:30 AM Lisa Torres MD BSMA BS AMB     Total time: video conference, lab review, chart/note review, med review = 25min      712  Subjective:   Dayanna De La Fuente is a 37 y.o. female who was seen for Anxiety and Depression    Had multiple panic attacks on Sunday. Did finally try prn buspirone 1 tab, which seemed to help. Did not cause any drowsiness, maybe even felt slightly energetic.   \"trying to figure out what has been going on in the last 1.5wks\"  Feels jumpy, very irritable, and \"snapping at people\". Admits to significantly decreased sleep and PO intake. \"I'm jumpy for no good reason. For example, I thought a water bottle was going to spill, and even if it did, it wouldn't have made much of a mess at all, and it wasn't even my water bottle, but I'm the one who was really startled and jumpy about it. \"  Is not particularly pleased with her experiences thus far with her counselor. Is currently now looking for other employment opportunities. 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 \"I'm thinking that this job is just not right for me anymore. \"      HOLLY 2/7 10/13/2020 10/13/2020 9/18/2020   Feeling nervous, anxious or on edge? 2 2 1   Not being able to stop or control worrying? 3 3 2   HOLLY-2 Subtotal 5 - 3   Worrying too much about different things? 3 3 1   Trouble relaxing? 1 1 1   Being so restless that it is hard to sit still? 2 2 2   Becoming easily annoyed or irritable? 2 2 0   Feeling afraid as if something awful might happen? 1 1 0   HOLLY-7 Total Score 14 - 7   If you checked off any problems, how difficult have these problems made it for you to do your work, take care of thinks at home, or get along with other people?   Extremely difficult - Somewhat difficult       3 most recent PHQ Screens 10/13/2020   Little interest or pleasure in doing things More than half the days   Feeling down, depressed, irritable, or hopeless Nearly every day   Total Score PHQ 2 5   Trouble falling or staying asleep, or sleeping too much Nearly every day   Feeling tired or having little energy Nearly every day   Poor appetite, weight loss, or overeating Nearly every day   Feeling bad about yourself - or that you are a failure or have let yourself or your family down Nearly every day   Trouble concentrating on things such as school, work, reading, or watching TV Several days   Moving or speaking so slowly that other people could have noticed; or the opposite being so fidgety that others notice More than half the days   Thoughts of being better off dead, or hurting yourself in some way Not at all   PHQ 9 Score 20   How difficult have these problems made it for you to do your work, take care of your home and get along with others Extremely difficult           Prior to Admission medications    Medication Sig Start Date End Date Taking? Authorizing Provider   sertraline (ZOLOFT) 50 mg tablet Take 1 Tab by mouth daily. 10/13/20  Yes Deanna Silverio MD   busPIRone (BUSPAR) 10 mg tablet Take 1 Tab by mouth three (3) times daily. 6/12/20  Yes Symone Torres MD   topiramate (TOPAMAX) 200 mg tablet Take 200 mg by mouth two (2) times a day. 12/10/19  Yes Provider, Historical   levonorgestrel (MIRENA) 20 mcg/24 hours (5 yrs) 52 mg IUD Mirena 20 mcg/24 hours (5 yrs) 52 mg intrauterine device   Take 1 device by intrauterine route. Yes Provider, Historical   fluconazole (DIFLUCAN) 150 mg tablet TAKE 1 TABLET BY MOUTH EVERY 7 DAYS FOR 21 DAYS 8/27/20   Symone Torres MD   fluticasone propionate (FLONASE) 50 mcg/actuation nasal spray SHAKE LIQUID AND USE 2 SPRAYS IN EACH NOSTRIL DAILY 5/4/20   Symone Torres MD   oxybutynin (DITROPAN) 5 mg tablet Take 1 Tab by mouth three (3) times daily as needed (urinary frequency). 2/14/20   Lia Torres MD   cholecalciferol, vitamin D3, (VITAMIN D3 PO) Take 1 Tab by mouth daily. Provider, Historical   cyanocobalamin, vitamin B-12, (VITAMIN B-12) 1,000 mcg/mL drop Take 1-2 Drops by mouth daily.     Provider, Historical     Allergies   Allergen Reactions    Iodine Unknown (comments)     Other reaction(s): anaphylaxis/angioedema  Face throat, swelled after CT in 2001, had MRI w/wo Contrast in 2016 with no problem    Sulfa (Sulfonamide Antibiotics) Swelling    Codeine Hives    Iodinated Contrast Media Itching and Swelling    Pecan Nut Other (comments)     Throat and ears itch    Prednisone Other (comments)    Shellfish Derived Swelling    Sudafed [Pseudoephedrine Hcl] Other (comments)     Has seizure when combined w/ antibotic    Sulfamethoxazole-Trimethoprim Unknown (comments)     Stated had seizure r/t an interaction with seizure medication        Patient Active Problem List    Diagnosis Date Noted    Seizure disorder ([facility] 11/26/2013     Priority: 2 - Two    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    Psychophysiological insomnia 08/08/2020    Panic attacks 08/08/2020    Other constipation 01/19/2020    Endometriosis 05/08/2019    Dysmenorrhea 02/12/2019    Thyroid nodule 02/12/2019    Loose skin 07/27/2016    Allergic reaction 07/27/2016    Abnormal uterine bleeding (AUB) 07/27/2016    Vitamin D deficiency 01/25/2016    Frequent UTI 01/22/2016    CKD (chronic kidney disease) stage 2, GFR 60-89 ml/min 05/13/2015    GARCIA (dyspnea on exertion) 03/03/2014     Past Medical History:   Diagnosis Date    BV (bacterial vaginosis)     Chronic BV    Depression     Epilepsy ([facility] 2001    Dr. Stacy Vanegas H/O exercise stress test 11/26/2013    exercise nuc stress test wnl with EF 83%    Hematuria, microscopic 2015    Pelvic floor dysfunction     Recurrent UTI     Seizures (HCC)     Urinary frequency     UTI (urinary tract infection)      Past Surgical History:   Procedure Laterality Date    HX CHOLECYSTECTOMY  2005    HX TUBAL LIGATION  2008    LAP,CHOLECYSTECTOMY       Family History   Problem Relation Age of Onset    Thyroid Disease Mother     Alcohol abuse Father     Thyroid Disease Maternal Aunt     Thyroid Disease Maternal Grandmother     Cataract Paternal Grandmother      Social History     Tobacco Use    Smoking status: Never Smoker    Smokeless tobacco: Never Used   Substance Use Topics    Alcohol use: Not Currently       Review of Systems   Constitutional: Negative for chills and fever. HENT: Negative for ear pain and sore throat.     Respiratory: Negative for cough and shortness of breath. Cardiovascular: Negative for chest pain and palpitations. Gastrointestinal: Negative for abdominal pain. Genitourinary: Negative for dysuria. Musculoskeletal: Negative for myalgias. Skin: Negative for rash. Neurological: Negative for speech change, focal weakness and headaches. Endo/Heme/Allergies: Does not bruise/bleed easily. Psychiatric/Behavioral: Positive for depression. Negative for suicidal ideas. The patient is nervous/anxious and has insomnia. Objective:   Vital Signs: (As obtained by patient/caregiver at home)  There were no vitals taken for this visit. Physical Exam  Constitutional:       General: She is awake. She is not in acute distress. Appearance: She is not toxic-appearing or diaphoretic. HENT:      Head: Normocephalic and atraumatic. Nose: Nose normal.   Eyes:      General: No scleral icterus. Extraocular Movements: Extraocular movements intact. Conjunctiva/sclera: Conjunctivae normal.   Neck:      Musculoskeletal: Full passive range of motion without pain. Pulmonary:      Effort: Pulmonary effort is normal. No accessory muscle usage or respiratory distress. Skin:     General: Skin is dry. Neurological:      Mental Status: She is alert. Cranial Nerves: No cranial nerve deficit. Psychiatric:         Attention and Perception: Attention and perception normal.         Mood and Affect: Mood is anxious. Affect is angry. Speech: Speech normal.         Behavior: Behavior normal. Behavior is cooperative. Thought Content: Thought content normal. Thought content does not include homicidal or suicidal ideation. Thought content does not include homicidal or suicidal plan. Cognition and Memory: Cognition and memory normal.                We discussed the expected course, resolution and complications of the diagnosis(es) in detail.   Medication risks, benefits, costs, interactions, and alternatives were discussed as indicated. I advised her to contact the office if her condition worsens, changes or fails to improve as anticipated. She expressed understanding with the diagnosis(es) and plan. Viv Noonan is a 37 y.o. female who was evaluated by an audio-video encounter for concerns as above. Patient identification was verified prior to start of the visit. A caregiver was present when appropriate. Due to this being a TeleHealth encounter (During KMGUI-00 public health emergency), evaluation of the following organ systems was limited: Vitals/Constitutional/EENT/Resp/CV/GI//MS/Neuro/Skin/Heme-Lymph-Imm. Pursuant to the emergency declaration under the [facility], [facility]5 waiver authority and the [employer] and [employer] Act, this Virtual Visit was conducted, with patient's (and/or legal guardian's) consent, to reduce the patient's risk of exposure to COVID-19 and provide necessary medical care. Services were provided through a synchronous discussion virtually to substitute for in-person clinic visit. I was in the office. The patient was at home.       Claudia Guerrero MD  Internal Medicine, Family Medicine & Sports Medicine

## 2020-10-27 ENCOUNTER — VIRTUAL VISIT (OUTPATIENT)
Dept: FAMILY MEDICINE CLINIC | Age: 40
End: 2020-10-27
Payer: COMMERCIAL

## 2020-10-27 DIAGNOSIS — F41.0 PANIC ATTACKS: ICD-10-CM

## 2020-10-27 DIAGNOSIS — R45.4 IRRITABILITY AND ANGER: ICD-10-CM

## 2020-10-27 DIAGNOSIS — F41.8 ANXIOUS DEPRESSION: ICD-10-CM

## 2020-10-27 DIAGNOSIS — F51.04 PSYCHOPHYSIOLOGICAL INSOMNIA: Primary | ICD-10-CM

## 2020-10-27 PROCEDURE — 99214 OFFICE O/P EST MOD 30 MIN: CPT | Performed by: FAMILY MEDICINE

## 2020-10-27 RX ORDER — SERTRALINE HYDROCHLORIDE 50 MG/1
50 TABLET, FILM COATED ORAL DAILY
Qty: 90 TAB | Refills: 1 | Status: SHIPPED | OUTPATIENT
Start: 2020-10-27 | End: 2021-01-15 | Stop reason: SDUPTHER

## 2020-10-27 NOTE — PROGRESS NOTES
[address] [place] [address]  Primary Care Office Visit - Telemedicine Problem-Oriented Note    Consent: Adalberto Baer, who was seen by synchronous (real-time) audio-video technology, and/or her healthcare decision maker, is aware that this patient-initiated, Telehealth encounter on 10/27/2020 is a billable service, with coverage as determined by her insurance carrier. She is aware that she may receive a bill and has provided verbal consent to proceed: Yes. This service was provided through telehealth via [employer] me, both the Patient Home and [address] [place] [address]. Assessment & Plan:       ICD-10-CM ICD-9-CM   1. Psychophysiological insomnia  F51.04 307.42   2. 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 Panic attacks  F41.0 300.01   4. Irritability and anger  R45.4 799.22       Majority of visit spent with counseling & education, specifically re: the physical manifestations of stress and anxiety, particularly during a panic attack. > continue zoloft 50mg  > continue buspirone use PRN, encouraged not to wait until anxiety level is quite high  > suggested regular exercise to discharge excess adrenaline on a regular basis  > encouraged her to continue ongoing thoughts on what she can do and is willing to do to make a living    Plan on f/u a few days before return to work, as she may need [facility] accommodations for intermittent absences, depending on the result of her discussion with her Human Resources department. Orders Placed This Encounter    sertraline (ZOLOFT) 50 mg tablet     Sig: Take 1 Tab by mouth daily. Dispense:  90 Tab     Refill:  1     Future Appointments   Date Time Provider Santiago Naqvi   11/9/2020  9:45 AM Siri Torres MD BSMA BS AMB         Total time: video conference, lab review, chart/note review, med review = 25min      712  Subjective:   Adalberto Baer is a 37 y.o. female who was seen for Anxiety     First week used buspirone a few times. Has not used since.   Is sleeping a bit better now. Does not note any AE from increased zoloft dose. Has FMLA until nov 11th. Did apply for some jobs. Still undecided about returning to her job. Is wondering about filing for disability. Prior to Admission medications    Medication Sig Start Date End Date Taking? Authorizing Provider   sertraline (ZOLOFT) 50 mg tablet Take 1 Tab by mouth daily. 10/13/20   Arielle Torres MD   fluconazole (DIFLUCAN) 150 mg tablet TAKE 1 TABLET BY MOUTH EVERY 7 DAYS FOR 21 DAYS 8/27/20   Symone Torres MD   busPIRone (BUSPAR) 10 mg tablet Take 1 Tab by mouth three (3) times daily. 6/12/20   Arielle Trores MD   fluticasone propionate (FLONASE) 50 mcg/actuation nasal spray SHAKE LIQUID AND USE 2 SPRAYS IN EACH NOSTRIL DAILY 5/4/20   Symone Torres MD   oxybutynin (DITROPAN) 5 mg tablet Take 1 Tab by mouth three (3) times daily as needed (urinary frequency). 2/14/20   Arielle Torres MD   topiramate (TOPAMAX) 200 mg tablet Take 200 mg by mouth two (2) times a day. 12/10/19   Provider, Historical   cholecalciferol, vitamin D3, (VITAMIN D3 PO) Take 1 Tab by mouth daily. Provider, Historical   cyanocobalamin, vitamin B-12, (VITAMIN B-12) 1,000 mcg/mL drop Take 1-2 Drops by mouth daily. Provider, Historical   levonorgestrel (MIRENA) 20 mcg/24 hours (5 yrs) 52 mg IUD Mirena 20 mcg/24 hours (5 yrs) 52 mg intrauterine device   Take 1 device by intrauterine route.     Provider, Historical     Allergies   Allergen Reactions    Iodine Unknown (comments)     Other reaction(s): anaphylaxis/angioedema  Face throat, swelled after CT in 2001, had MRI w/wo Contrast in 2016 with no problem    Sulfa (Sulfonamide Antibiotics) Swelling    Codeine Hives    Iodinated Contrast Media Itching and Swelling    Pecan Nut Other (comments)     Throat and ears itch    Prednisone Other (comments)    Shellfish Derived Swelling    Sudafed [Pseudoephedrine Hcl] Other (comments)     Has seizure when combined w/ antibotic    Sulfamethoxazole-Trimethoprim Unknown (comments)     Stated had seizure r/t an interaction with seizure medication        Patient Active Problem List    Diagnosis Date Noted    Seizure disorder ([facility] 11/26/2013     Priority: 2 - Two    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    Psychophysiological insomnia 08/08/2020    Panic attacks 08/08/2020    Other constipation 01/19/2020    Endometriosis 05/08/2019    Dysmenorrhea 02/12/2019    Thyroid nodule 02/12/2019    Loose skin 07/27/2016    Allergic reaction 07/27/2016    Abnormal uterine bleeding (AUB) 07/27/2016    Vitamin D deficiency 01/25/2016    Frequent UTI 01/22/2016    CKD (chronic kidney disease) stage 2, GFR 60-89 ml/min 05/13/2015    GARCIA (dyspnea on exertion) 03/03/2014     Past Medical History:   Diagnosis Date    BV (bacterial vaginosis)     Chronic BV    Depression     Epilepsy ([facility] 2001    Dr. Art Khalil H/O exercise stress test 11/26/2013    exercise nuc stress test wnl with EF 83%    Hematuria, microscopic 2015    Pelvic floor dysfunction     Recurrent UTI     Seizures (HCC)     Urinary frequency     UTI (urinary tract infection)      Past Surgical History:   Procedure Laterality Date    HX CHOLECYSTECTOMY  2005    HX TUBAL LIGATION  2008    LAP,CHOLECYSTECTOMY       Family History   Problem Relation Age of Onset    Thyroid Disease Mother     Alcohol abuse Father     Thyroid Disease Maternal Aunt     Thyroid Disease Maternal Grandmother     Cataract Paternal Grandmother      Social History     Tobacco Use    Smoking status: Never Smoker    Smokeless tobacco: Never Used   Substance Use Topics    Alcohol use: Not Currently       Review of Systems   Constitutional: Negative for chills and fever. HENT: Negative for ear pain and sore throat. Respiratory: Negative for cough and shortness of breath. Cardiovascular: Negative for chest pain and palpitations.    Gastrointestinal: Negative for abdominal pain. Genitourinary: Negative for dysuria. Musculoskeletal: Negative for myalgias. Skin: Negative for rash. Neurological: Negative for speech change, focal weakness and headaches. Endo/Heme/Allergies: Does not bruise/bleed easily. Psychiatric/Behavioral: Negative for depression and suicidal ideas. The patient is nervous/anxious. The patient does not have insomnia. Objective:   Vital Signs: (As obtained by patient/caregiver at home)  There were no vitals taken for this visit. Physical Exam  Constitutional:       General: She is awake. She is not in acute distress. Appearance: Normal appearance. She is obese. She is not toxic-appearing or diaphoretic. HENT:      Head: Normocephalic and atraumatic. Nose: Nose normal.   Eyes:      General: No scleral icterus. Extraocular Movements: Extraocular movements intact. Conjunctiva/sclera: Conjunctivae normal.   Neck:      Musculoskeletal: Full passive range of motion without pain. Pulmonary:      Effort: Pulmonary effort is normal. No accessory muscle usage or respiratory distress. Skin:     General: Skin is dry. Neurological:      Mental Status: She is alert. Cranial Nerves: No cranial nerve deficit. Psychiatric:         Attention and Perception: Attention and perception normal.         Mood and Affect: Mood and affect normal.         Speech: Speech normal.         Behavior: Behavior normal. Behavior is cooperative. Cognition and Memory: Cognition and memory normal.                We discussed the expected course, resolution and complications of the diagnosis(es) in detail. Medication risks, benefits, costs, interactions, and alternatives were discussed as indicated. I advised her to contact the office if her condition worsens, changes or fails to improve as anticipated. She expressed understanding with the diagnosis(es) and plan.      Jacinta Jacobs is a 37 y.o. female who was evaluated by an audio-video encounter for concerns as above. Patient identification was verified prior to start of the visit. A caregiver was present when appropriate. Due to this being a TeleHealth encounter (During VKIVL-48 public health emergency), evaluation of the following organ systems was limited: Vitals/Constitutional/EENT/Resp/CV/GI//MS/Neuro/Skin/Heme-Lymph-Imm. Pursuant to the emergency declaration under the [facility], [facility] waiver authority and the [employer] Act, this Virtual Visit was conducted, with patient's (and/or legal guardian's) consent, to reduce the patient's risk of exposure to COVID-19 and provide necessary medical care. Services were provided through a synchronous discussion virtually to substitute for in-person clinic visit. I was in the office. The patient was at home.       Claudia Guerrero MD  Internal Medicine, Family Medicine & Sports Medicine

## 2020-11-09 ENCOUNTER — VIRTUAL VISIT (OUTPATIENT)
Dept: FAMILY MEDICINE CLINIC | Age: 40
End: 2020-11-09
Payer: COMMERCIAL

## 2020-11-09 DIAGNOSIS — F41.0 PANIC ATTACKS: ICD-10-CM

## 2020-11-09 DIAGNOSIS — F41.8 ANXIOUS DEPRESSION: Primary | ICD-10-CM

## 2020-11-09 PROCEDURE — 99214 OFFICE O/P EST MOD 30 MIN: CPT | Performed by: FAMILY MEDICINE

## 2020-11-09 NOTE — PROGRESS NOTES
[facility]  Primary Care Office Visit - Telemedicine Problem-Oriented Note    Consent: Judith Ann, who was seen by synchronous (real-time) audio-video technology, and/or her healthcare decision maker, is aware that this patient-initiated, Telehealth encounter on 11/9/2020 is a billable service, with coverage as determined by her insurance carrier. She is aware that she may receive a bill and has provided verbal consent to proceed: Yes. This service was provided through telehealth via [employer] me, both the Patient Home and [facility]. Assessment & Plan:       ICD-10-CM ICD-9-CM   1. 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 Panic attacks  F41.0 300.01       Ongoing. At this time, has more tools for coping, and demonstrates improved insight & perspective. Discussed at length options for returning to work. At this point, will start with 6 hours per day, 30h/week. No change to current medication regimen. Will continue with close follow-up. Key Psychotherapeutic Meds             sertraline (ZOLOFT) 50 mg tablet (Taking) Take 1 Tab by mouth daily. busPIRone (BUSPAR) 10 mg tablet (Taking) Take 1 Tab by mouth three (3) times daily. No orders of the defined types were placed in this encounter. Future Appointments   Date Time Provider Santiago Naqvi   12/7/2020  9:15 AM Ann Torres MD BSMA BS AMB         Total time: video conference, lab review, chart/note review, med review = 21min      712  Subjective:   Judith Ann is a 41 y.o. female who was seen for Anxiety    Has been looking at other jobs. Looking at [employer], but pays less. Is wanting to try to return to her job with limited hours. Is aware that since she does not have any additional FMLA left, that her job may not continue to employ her with these limitations. Is taking her zoloft regularly. Had to use her buspirone recently, mainly in context of returning to work.       Prior to Admission medications    Medication Sig Start Date End Date Taking? Authorizing Provider   sertraline (ZOLOFT) 50 mg tablet Take 1 Tab by mouth daily. 10/27/20   Alonzo Torres MD   fluconazole (DIFLUCAN) 150 mg tablet TAKE 1 TABLET BY MOUTH EVERY 7 DAYS FOR 21 DAYS 8/27/20   Symone Torres MD   busPIRone (BUSPAR) 10 mg tablet Take 1 Tab by mouth three (3) times daily. 6/12/20   Alonzo Torres MD   fluticasone propionate (FLONASE) 50 mcg/actuation nasal spray SHAKE LIQUID AND USE 2 SPRAYS IN EACH NOSTRIL DAILY 5/4/20   Symone Torres MD   oxybutynin (DITROPAN) 5 mg tablet Take 1 Tab by mouth three (3) times daily as needed (urinary frequency). 2/14/20   Alonzo Torres MD   topiramate (TOPAMAX) 200 mg tablet Take 200 mg by mouth two (2) times a day. 12/10/19   Provider, Historical   cholecalciferol, vitamin D3, (VITAMIN D3 PO) Take 1 Tab by mouth daily. Provider, Historical   cyanocobalamin, vitamin B-12, (VITAMIN B-12) 1,000 mcg/mL drop Take 1-2 Drops by mouth daily. Provider, Historical   levonorgestrel (MIRENA) 20 mcg/24 hours (5 yrs) 52 mg IUD Mirena 20 mcg/24 hours (5 yrs) 52 mg intrauterine device   Take 1 device by intrauterine route.     Provider, Historical     Allergies   Allergen Reactions    Iodine Unknown (comments)     Other reaction(s): anaphylaxis/angioedema  Face throat, swelled after CT in 2001, had MRI w/wo Contrast in 2016 with no problem    Sulfa (Sulfonamide Antibiotics) Swelling    Codeine Hives    Iodinated Contrast Media Itching and Swelling    Pecan Nut Other (comments)     Throat and ears itch    Prednisone Other (comments)    Shellfish Derived Swelling    Sudafed [Pseudoephedrine Hcl] Other (comments)     Has seizure when combined w/ antibotic    Sulfamethoxazole-Trimethoprim Unknown (comments)     Stated had seizure r/t an interaction with seizure medication        Patient Active Problem List    Diagnosis Date Noted    Seizure disorder ([facility] [address] 11/26/2013 Priority: 2 - Two    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    Psychophysiological insomnia 08/08/2020    Panic attacks 08/08/2020    Other constipation 01/19/2020    Endometriosis 05/08/2019    Dysmenorrhea 02/12/2019    Thyroid nodule 02/12/2019    Loose skin 07/27/2016    Allergic reaction 07/27/2016    Abnormal uterine bleeding (AUB) 07/27/2016    Vitamin D deficiency 01/25/2016    Frequent UTI 01/22/2016    CKD (chronic kidney disease) stage 2, GFR 60-89 ml/min 05/13/2015    GARCIA (dyspnea on exertion) 03/03/2014     Past Medical History:   Diagnosis Date    BV (bacterial vaginosis)     Chronic BV    Depression     Epilepsy ([address] 2001    Dr. Dereje Panda H/O exercise stress test 11/26/2013    exercise nuc stress test wnl with EF 83%    Hematuria, microscopic 2015    Pelvic floor dysfunction     Recurrent UTI     Seizures (HCC)     Urinary frequency     UTI (urinary tract infection)      Past Surgical History:   Procedure Laterality Date    HX CHOLECYSTECTOMY  2005    HX TUBAL LIGATION  2008    LAP,CHOLECYSTECTOMY       Family History   Problem Relation Age of Onset    Thyroid Disease Mother     Alcohol abuse Father     Thyroid Disease Maternal Aunt     Thyroid Disease Maternal Grandmother     Cataract Paternal Grandmother      Social History     Tobacco Use    Smoking status: Never Smoker    Smokeless tobacco: Never Used   Substance Use Topics    Alcohol use: Not Currently       Review of Systems   Constitutional: Negative for chills and fever. HENT: Negative for ear pain and sore throat. Respiratory: Negative for cough and shortness of breath. Cardiovascular: Negative for chest pain and palpitations. Gastrointestinal: Negative for abdominal pain. Genitourinary: Negative for dysuria. Musculoskeletal: Negative for myalgias. Skin: Negative for rash. Neurological: Negative for speech change, focal weakness and headaches.    Endo/Heme/Allergies: Does not bruise/bleed easily. Psychiatric/Behavioral: Negative for depression and suicidal ideas. The patient is nervous/anxious. The patient does not have insomnia. Objective:   Vital Signs: (As obtained by patient/caregiver at home)  There were no vitals taken for this visit. Physical Exam  Constitutional:       General: She is awake. She is not in acute distress. Appearance: Normal appearance. She is overweight. She is not toxic-appearing or diaphoretic. HENT:      Head: Normocephalic and atraumatic. Nose: Nose normal.   Eyes:      General: No scleral icterus. Extraocular Movements: Extraocular movements intact. Conjunctiva/sclera: Conjunctivae normal.   Neck:      Musculoskeletal: Full passive range of motion without pain. Pulmonary:      Effort: Pulmonary effort is normal. No accessory muscle usage or respiratory distress. Skin:     General: Skin is dry. Neurological:      Mental Status: She is alert. Cranial Nerves: No cranial nerve deficit. Psychiatric:         Attention and Perception: Attention and perception normal.         Mood and Affect: Mood and affect normal.         Speech: Speech normal.         Behavior: Behavior normal. Behavior is cooperative. Cognition and Memory: Cognition and memory normal.              We discussed the expected course, resolution and complications of the diagnosis(es) in detail. Medication risks, benefits, costs, interactions, and alternatives were discussed as indicated. I advised her to contact the office if her condition worsens, changes or fails to improve as anticipated. She expressed understanding with the diagnosis(es) and plan. Adalberto Baer is a 41 y.o. female who was evaluated by an audio-video encounter for concerns as above. Patient identification was verified prior to start of the visit. A caregiver was present when appropriate.  Due to this being a TeleHealth encounter (During IKZRY-14 public health emergency), evaluation of the following organ systems was limited: Vitals/Constitutional/EENT/Resp/CV/GI//MS/Neuro/Skin/Heme-Lymph-Imm. Pursuant to the emergency declaration under the [facility] waiver authority and the [employer] Act, this Virtual Visit was conducted, with patient's (and/or legal guardian's) consent, to reduce the patient's risk of exposure to COVID-19 and provide necessary medical care. Services were provided through a synchronous discussion virtually to substitute for in-person clinic visit. I was in the office. The patient was at home.       Sarahy Campos MD  Internal Medicine, Family Medicine & Sports Medicine

## 2020-11-10 ENCOUNTER — TELEPHONE (OUTPATIENT)
Dept: FAMILY MEDICINE CLINIC | Age: 40
End: 2020-11-10

## 2020-12-07 ENCOUNTER — VIRTUAL VISIT (OUTPATIENT)
Dept: FAMILY MEDICINE CLINIC | Age: 40
End: 2020-12-07
Payer: COMMERCIAL

## 2020-12-07 DIAGNOSIS — G40.909 SEIZURE DISORDER (HCC): ICD-10-CM

## 2020-12-07 DIAGNOSIS — F41.8 ANXIOUS DEPRESSION: Primary | ICD-10-CM

## 2020-12-07 DIAGNOSIS — G44.229 CHRONIC TENSION-TYPE HEADACHE, NOT INTRACTABLE: ICD-10-CM

## 2020-12-07 DIAGNOSIS — N89.8 VAGINAL IRRITATION: ICD-10-CM

## 2020-12-07 PROCEDURE — 99214 OFFICE O/P EST MOD 30 MIN: CPT | Performed by: FAMILY MEDICINE

## 2020-12-07 RX ORDER — FLUCONAZOLE 150 MG/1
TABLET ORAL
Qty: 3 TAB | Refills: 1 | Status: SHIPPED | OUTPATIENT
Start: 2020-12-07 | End: 2021-03-03

## 2020-12-07 NOTE — PROGRESS NOTES
Note to patient:  The purpose of this note is to communicate optimally with the other physicians / APCs involved in your care. It is written using standard medical terminology. If you have questions regarding the details of the note, please contact my office to schedule an appointment to address your questions. [facility]  Primary Care Office Visit - Telemedicine Problem-Oriented Note    Consent: Dayanna De La Fuente, who was seen by synchronous (real-time) audio-video technology, and/or her healthcare decision maker, is aware that this patient-initiated, Telehealth encounter on 12/7/2020 is a billable service, with coverage as determined by her insurance carrier. She is aware that she may receive a bill and has provided verbal consent to proceed: Yes. This service was provided through telehealth via [employer] me, both the Patient Home and [facility]. Assessment & Plan:       ICD-10-CM ICD-9-CM   1. 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 Chronic tension-type headache, not intractable  G44.229 339.12   3. Seizure disorder ([facility] [address]  G40.909 345.90   4. Vaginal irritation  N89.8 623.9       # anxious depression  And   # panic attacks  Ongoing, stable. > continue counseling  > continue current Rx  > can try increasing hours, max of 35h/wk. Paperwork completed for employer  Key Psychotherapeutic Meds             sertraline (ZOLOFT) 50 mg tablet (Taking) Take 1 Tab by mouth daily. busPIRone (BUSPAR) 10 mg tablet (Taking) Take 1 Tab by mouth three (3) times daily. # tension-type headache / migraine  Ongoing, worsening, in setting of   # seizure disorder  Has had dry needling for cervicogenic component in the past.    Was Rx parafon forte by neuro in Aug 2020, however hasn't tried it yet.   zanaflex clinically ineffective in the past.  > recommended getting blue light glasses for computer use  > encourage frequent breaks with gentle neck exercises  > trial of parafon forte as prescribed by neurology, consider trying 1/2 dose before bed on a weekend night  Key Neurological Meds             topiramate (TOPAMAX) 200 mg tablet (Taking) Take 200 mg by mouth two (2) times a day. # vaginal irritation  Episodically recurrent. > fluconazole prn      Orders Placed This Encounter    fluconazole (DIFLUCAN) 150 mg tablet     Sig: TAKE 1 TABLET BY MOUTH EVERY 7 DAYS FOR 21 DAYS     Dispense:  3 Tab     Refill:  1         712  Subjective:   Mary Kang is a 41 y.o. female who was seen for Anxiety    Last VV: 11/9/2020    Hasn't interacted much with supervisor. Generally doing 30h/wk. Frequently doing computer / desk work for several hours without breaks. \"My therapist told me that I need to tell you that I do that. \"  Notes nearly daily migraines, and would prefer not to use her injectable abortive medications. Was so severe that she couldn't get out of bed on Sat. Starts with neck tension, progresses to tension across the forehead and assoc with photophobia / phonophobia and intolerance of smells. No seizure activity. Prior to Admission medications    Medication Sig Start Date End Date Taking? Authorizing Provider   sertraline (ZOLOFT) 50 mg tablet Take 1 Tab by mouth daily. 10/27/20   Ar Torres MD   fluconazole (DIFLUCAN) 150 mg tablet TAKE 1 TABLET BY MOUTH EVERY 7 DAYS FOR 21 DAYS 8/27/20   Symone Torres MD   busPIRone (BUSPAR) 10 mg tablet Take 1 Tab by mouth three (3) times daily. 6/12/20   Ar Torres MD   fluticasone propionate (FLONASE) 50 mcg/actuation nasal spray SHAKE LIQUID AND USE 2 SPRAYS IN EACH NOSTRIL DAILY 5/4/20   Symone Torres MD   oxybutynin (DITROPAN) 5 mg tablet Take 1 Tab by mouth three (3) times daily as needed (urinary frequency). 2/14/20   Ar Torres MD   topiramate (TOPAMAX) 200 mg tablet Take 200 mg by mouth two (2) times a day.  12/10/19   Provider, Historical   cholecalciferol, vitamin D3, (VITAMIN D3 PO) Take 1 Tab by mouth daily. Provider, Historical   cyanocobalamin, vitamin B-12, (VITAMIN B-12) 1,000 mcg/mL drop Take 1-2 Drops by mouth daily. Provider, Historical   levonorgestrel (MIRENA) 20 mcg/24 hours (5 yrs) 52 mg IUD Mirena 20 mcg/24 hours (5 yrs) 52 mg intrauterine device   Take 1 device by intrauterine route.     Provider, Historical     Allergies   Allergen Reactions    Iodine Unknown (comments)     Other reaction(s): anaphylaxis/angioedema  Face throat, swelled after CT in 2001, had MRI w/wo Contrast in 2016 with no problem    Sulfa (Sulfonamide Antibiotics) Swelling    Codeine Hives    Iodinated Contrast Media Itching and Swelling    Pecan Nut Other (comments)     Throat and ears itch    Prednisone Other (comments)    Shellfish Derived Swelling    Sudafed [Pseudoephedrine Hcl] Other (comments)     Has seizure when combined w/ antibotic    Sulfamethoxazole-Trimethoprim Unknown (comments)     Stated had seizure r/t an interaction with seizure medication        Patient Active Problem List    Diagnosis Date Noted    Seizure disorder ([facility] 11/26/2013     Priority: 2 - Two    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    Psychophysiological insomnia 08/08/2020    Panic attacks 08/08/2020    Other constipation 01/19/2020    Endometriosis 05/08/2019    Dysmenorrhea 02/12/2019    Thyroid nodule 02/12/2019    Loose skin 07/27/2016    Allergic reaction 07/27/2016    Abnormal uterine bleeding (AUB) 07/27/2016    Vitamin D deficiency 01/25/2016    Frequent UTI 01/22/2016    CKD (chronic kidney disease) stage 2, GFR 60-89 ml/min 05/13/2015    GARCIA (dyspnea on exertion) 03/03/2014     Past Medical History:   Diagnosis Date    BV (bacterial vaginosis)     Chronic BV    Depression     Epilepsy ([facility] [address] 2001    Dr. Stacy Vanegas H/O exercise stress test 11/26/2013    exercise nuc stress test wnl with EF 83%    Hematuria, microscopic 2015    Pelvic floor dysfunction     Recurrent UTI     Seizures ([address]     Urinary frequency     UTI (urinary tract infection)      Past Surgical History:   Procedure Laterality Date    HX CHOLECYSTECTOMY  2005    HX TUBAL LIGATION  2008    LAP,CHOLECYSTECTOMY       Family History   Problem Relation Age of Onset    Thyroid Disease Mother     Alcohol abuse Father     Thyroid Disease Maternal Aunt     Thyroid Disease Maternal Grandmother     Cataract Paternal Grandmother      Social History     Tobacco Use    Smoking status: Never Smoker    Smokeless tobacco: Never Used   Substance Use Topics    Alcohol use: Not Currently       Review of Systems   Constitutional: Negative for chills and fever. HENT: Negative for ear pain and sore throat. Phonophobia and smell intolerance assoc with HA   Eyes: Positive for photophobia. Respiratory: Negative for cough and shortness of breath. Cardiovascular: Negative for chest pain and palpitations. Gastrointestinal: Negative for abdominal pain. Genitourinary: Negative for dysuria. Musculoskeletal: Positive for myalgias and neck pain. Skin: Negative for rash. Neurological: Positive for headaches. Negative for speech change and focal weakness. Endo/Heme/Allergies: Does not bruise/bleed easily. Psychiatric/Behavioral: Negative for depression. The patient is nervous/anxious. The patient does not have insomnia. Objective:   Vital Signs: (As obtained by patient/caregiver at home)  There were no vitals taken for this visit. Physical Exam  Constitutional:       General: She is awake. She is not in acute distress. Appearance: Normal appearance. She is overweight. She is not toxic-appearing or diaphoretic. HENT:      Head: Normocephalic and atraumatic. Nose: Nose normal.   Eyes:      General: No scleral icterus. Extraocular Movements: Extraocular movements intact. Conjunctiva/sclera: Conjunctivae normal.   Neck:      Musculoskeletal: Full passive range of motion without pain. Pulmonary:      Effort: Pulmonary effort is normal. No accessory muscle usage or respiratory distress. Skin:     General: Skin is dry. Neurological:      Mental Status: She is alert. Cranial Nerves: No cranial nerve deficit. Psychiatric:         Attention and Perception: Attention and perception normal.         Mood and Affect: Mood and affect normal.         Speech: Speech normal.         Behavior: Behavior normal. Behavior is cooperative. Cognition and Memory: Cognition and memory normal.                We discussed the expected course, resolution and complications of the diagnosis(es) in detail. Medication risks, benefits, costs, interactions, and alternatives were discussed as indicated. I advised her to contact the office if her condition worsens, changes or fails to improve as anticipated. She expressed understanding with the diagnosis(es) and plan. Carlyle Marley is a 41 y.o. female who was evaluated by an audio-video encounter for concerns as above. Patient identification was verified prior to start of the visit. A caregiver was present when appropriate. Due to this being a TeleHealth encounter ([facility]), evaluation of the following organ systems was limited: Vitals/Constitutional/EENT/Resp/CV/GI//MS/Neuro/Skin/Heme-Lymph-Imm. Pursuant to the emergency declaration under the [facility] waiver authority and the [employer] Act, this Virtual Visit was conducted, with patient's (and/or legal guardian's) consent, to reduce the patient's risk of exposure to COVID-19 and provide necessary medical care. Services were provided through a synchronous discussion virtually to substitute for in-person clinic visit. I was in the office. The patient was at home.       Shara Deshpande MD  Internal Medicine, Family Medicine & Sports Medicine

## 2021-01-06 DIAGNOSIS — N89.8 VAGINAL DISCHARGE: ICD-10-CM

## 2021-01-06 RX ORDER — METRONIDAZOLE 500 MG/1
TABLET ORAL
Qty: 14 TAB | Refills: 0 | Status: SHIPPED | OUTPATIENT
Start: 2021-01-06 | End: 2021-04-22 | Stop reason: SDUPTHER

## 2021-01-15 DIAGNOSIS — F41.8 ANXIOUS DEPRESSION: ICD-10-CM

## 2021-01-15 RX ORDER — SERTRALINE HYDROCHLORIDE 50 MG/1
50 TABLET, FILM COATED ORAL DAILY
Qty: 90 TAB | Refills: 1 | Status: SHIPPED | OUTPATIENT
Start: 2021-01-15 | End: 2021-04-12 | Stop reason: SDUPTHER

## 2021-01-15 NOTE — TELEPHONE ENCOUNTER
Last seen 12/7/20    Last filled 10/27/20 qty 90 w[facility]    Patient request rx to be sent to [facility].

## 2021-01-25 ENCOUNTER — PATIENT MESSAGE (OUTPATIENT)
Dept: FAMILY MEDICINE CLINIC | Age: 41
End: 2021-01-25

## 2021-01-25 DIAGNOSIS — N18.2 CKD (CHRONIC KIDNEY DISEASE) STAGE 2, GFR 60-89 ML/MIN: ICD-10-CM

## 2021-01-25 DIAGNOSIS — G40.909 SEIZURE DISORDER (HCC): Primary | ICD-10-CM

## 2021-01-25 DIAGNOSIS — N39.0 FREQUENT UTI: ICD-10-CM

## 2021-01-25 DIAGNOSIS — Z00.00 ROUTINE ADULT HEALTH MAINTENANCE: ICD-10-CM

## 2021-01-25 DIAGNOSIS — E55.9 VITAMIN D DEFICIENCY: ICD-10-CM

## 2021-01-25 DIAGNOSIS — F41.8 ANXIOUS DEPRESSION: ICD-10-CM

## 2021-01-25 DIAGNOSIS — R53.83 FATIGUE, UNSPECIFIED TYPE: ICD-10-CM

## 2021-01-25 DIAGNOSIS — E04.1 THYROID NODULE: ICD-10-CM

## 2021-01-25 NOTE — TELEPHONE ENCOUNTER
From: Lorenzo Skelton  To: Sharion Snellen, MD  Sent: 1/25/2021 9:28 AM EST  Subject: Non-Urgent Medical Question    Hi Dr Mary Anne Root    I believe we have an appointment for February 1st. Could you please schedule some blood and urine work prior to the visit? Im not sure whats going on but I would like to check my levels. My symptoms were itching on parts of the skin, very hungry on some days, I know have bruising on my thighs, and fatigue. I tried to schedule a visit with you sooner but youre booked.

## 2021-01-26 NOTE — TELEPHONE ENCOUNTER
Spoke to patient and scheduled     Future Appointments   Date Time Provider Santiago Naqvi   1/27/2021  8:00 AM LAB_BSMA BSMA BS AMB   2/1/2021 10:30 AM Debbie Torres MD BSMA BS AMB   2/12/2021 11:30 AM Debbie Torres MD BSMA BS AMB

## 2021-01-26 NOTE — TELEPHONE ENCOUNTER
Please call Roshni Johnson and arrange for fasting bloodwork, as well as an additional in-person appt, as we will not be able to cover all of her complaints in the upcoming 15min virtual visit, which is really only enough time to cover her [id number] concerns. Also please ask her to complete the quizzes online ahead of time for her upcoming virtual visit. Thanks.     Orders Placed This Encounter    CULTURE, URINE     Standing Status:   Future     Standing Expiration Date:   1/25/2022    HEMOGLOBIN A1C WITH EAG     Standing Status:   Future     Standing Expiration Date:   1/25/2022    CBC WITH AUTOMATED DIFF     Standing Status:   Future     Standing Expiration Date:   3/56/1442    METABOLIC PANEL, COMPREHENSIVE     Standing Status:   Future     Standing Expiration Date:   1/25/2022    T4, FREE     Standing Status:   Future     Standing Expiration Date:   1/25/2022    LIPID PANEL     Standing Status:   Future     Standing Expiration Date:   1/25/2022    TSH 3RD GENERATION     Standing Status:   Future     Standing Expiration Date:   1/25/2022    VITAMIN D, 25 HYDROXY     Standing Status:   Future     Standing Expiration Date:   1/25/2022    VITAMIN B12 & FOLATE     Standing Status:   Future     Standing Expiration Date:   1/25/2022    FERRITIN     Standing Status:   Future     Standing Expiration Date:   1/25/2022    IRON PROFILE     Standing Status:   Future     Standing Expiration Date:   1/25/2022    URINALYSIS W/MICROSCOPIC     Standing Status:   Future     Standing Expiration Date:   1/25/2022       Future Appointments   Date Time Provider Santiago Naqvi   2/1/2021 10:30 AM Ricasa, Homero Fothergill, MD BSMA BS AMB

## 2021-01-27 ENCOUNTER — APPOINTMENT (OUTPATIENT)
Dept: FAMILY MEDICINE CLINIC | Age: 41
End: 2021-01-27

## 2021-01-27 DIAGNOSIS — N39.0 FREQUENT UTI: ICD-10-CM

## 2021-01-27 DIAGNOSIS — F41.8 ANXIOUS DEPRESSION: ICD-10-CM

## 2021-01-27 DIAGNOSIS — R53.83 FATIGUE, UNSPECIFIED TYPE: ICD-10-CM

## 2021-01-27 DIAGNOSIS — Z00.00 ROUTINE ADULT HEALTH MAINTENANCE: ICD-10-CM

## 2021-01-27 DIAGNOSIS — E55.9 VITAMIN D DEFICIENCY: ICD-10-CM

## 2021-01-27 DIAGNOSIS — E04.1 THYROID NODULE: ICD-10-CM

## 2021-01-27 DIAGNOSIS — N18.2 CKD (CHRONIC KIDNEY DISEASE) STAGE 2, GFR 60-89 ML/MIN: ICD-10-CM

## 2021-02-01 ENCOUNTER — VIRTUAL VISIT (OUTPATIENT)
Dept: FAMILY MEDICINE CLINIC | Age: 41
End: 2021-02-01
Payer: COMMERCIAL

## 2021-02-01 DIAGNOSIS — R53.83 FATIGUE, UNSPECIFIED TYPE: Primary | ICD-10-CM

## 2021-02-01 DIAGNOSIS — F41.8 ANXIOUS DEPRESSION: ICD-10-CM

## 2021-02-01 DIAGNOSIS — E55.9 VITAMIN D DEFICIENCY: ICD-10-CM

## 2021-02-01 DIAGNOSIS — G47.9 DISORDERED SLEEP: ICD-10-CM

## 2021-02-01 DIAGNOSIS — G40.909 SEIZURE DISORDER (HCC): ICD-10-CM

## 2021-02-01 DIAGNOSIS — E04.1 THYROID NODULE: ICD-10-CM

## 2021-02-01 DIAGNOSIS — R79.0 LOW FERRITIN: ICD-10-CM

## 2021-02-01 PROCEDURE — 99214 OFFICE O/P EST MOD 30 MIN: CPT | Performed by: FAMILY MEDICINE

## 2021-02-01 RX ORDER — LANOLIN ALCOHOL/MO/W.PET/CERES
325 CREAM (GRAM) TOPICAL EVERY OTHER DAY
Qty: 30 TAB | Refills: 0
Start: 2021-02-01 | End: 2021-05-18 | Stop reason: SINTOL

## 2021-02-01 RX ORDER — ERGOCALCIFEROL 1.25 MG/1
50000 CAPSULE ORAL
Qty: 12 CAP | Refills: 1 | Status: SHIPPED | OUTPATIENT
Start: 2021-02-01 | End: 2021-06-11 | Stop reason: SDUPTHER

## 2021-02-01 NOTE — PROGRESS NOTES
Note to patient:  The purpose of this note is to communicate optimally with the other physicians / APCs involved in your care. It is written using standard medical terminology. If you have questions regarding the details of the note, please contact my office to schedule an appointment to address your questions. Ragini Romero  Primary Care Office Visit - Telemedicine Problem-Oriented Note    Assessment & Plan:       ICD-10-CM ICD-9-CM   1. Fatigue, unspecified type  R53.83 780.79   2. Disordered sleep  G47.9 780.50   3. Low ferritin  R79.0 790.6   4. Vitamin D deficiency  E55.9 268.9   5. 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 Seizure disorder ([address]  G40.909 345.90   7. Thyroid nodule  E04.1 241.0       # Fatigue  With  # disordered sleep (decreased sleep maintenance)  Recurrent issue, particularly problematic last 2 months  Labs reveal  # low ferritin of 18  Has mirena, thus no issue with menorrhagia  and  # VitD of 21  Recurrent issue  > restart daily iron supplementation as javed; goal ferritin > 75  > ergo 50k weekly, likely indefinitely as VitD def has been a recurrent issue      # anxious depression  Ongoing, relatively stable  > no change to current regimen  > provided documentation for no change to work limitations at this time  Future item: need to update documentation further at next visit      # sz disorder  Followed by neuro      # thyroid nodule  Recent TFTs wnl  Will examine thyroid during upcoming in-person appt      Orders Placed This Encounter    ergocalciferol (ERGOCALCIFEROL) 1,250 mcg (50,000 unit) capsule     Sig: Take 1 Cap by mouth every seven (7) days. Dispense:  12 Cap     Refill:  1    ferrous sulfate 325 mg (65 mg iron) tablet     Sig: Take 1 Tab by mouth every other day.      Dispense:  30 Tab     Refill:  0         Future Appointments   Date Time Provider Santiago Naqvi   2/12/2021 11:30 AM Ricasa, Coletta Meigs, MD BSMA BS AMB           [id number]  Subjective:   Mely HALL Dominic Guan is a 41 y.o. female who was seen for Anxiety    Last VV: 12/7/2020 - max 35h/wk    Has upcoming appt with neurology 2/11/2021    Issues staying asleep  Lack of energy during the day  Overeating  Going to bed 8-9pm  Some leg pain  Waking at least 3-4x/night  Nightmares recently      Just got a puppy in December  Lab mix. Not waking her. Uses buspirone occasionally  Still on the zoloft 50mg daily. Workload isn't too bad. Issues staying awake on some days. Squeezing hands to open jars. HOLLY 2/7 1/26/2021 10/13/2020 10/13/2020   Feeling nervous, anxious or on edge? 1 2 2   Not being able to stop or control worrying? 0 3 3   HOLLY-2 Subtotal - 5 -   Worrying too much about different things? 1 3 3   Trouble relaxing? 1 1 1   Being so restless that it is hard to sit still? 0 2 2   Becoming easily annoyed or irritable? 3 2 2   Feeling afraid as if something awful might happen? 1 1 1   HOLLY-7 Total Score 7 14 -   If you checked off any problems, how difficult have these problems made it for you to do your work, take care of thinks at home, or get along with other people?   Somewhat difficult Extremely difficult -       3 most recent PHQ Screens 1/26/2021   Little interest or pleasure in doing things Several days   Feeling down, depressed, irritable, or hopeless Several days   Total Score PHQ 2 -   Trouble falling or staying asleep, or sleeping too much Nearly every day   Feeling tired or having little energy Nearly every day   Poor appetite, weight loss, or overeating Several days   Feeling bad about yourself - or that you are a failure or have let yourself or your family down Several days   Trouble concentrating on things such as school, work, reading, or watching TV Several days   Moving or speaking so slowly that other people could have noticed; or the opposite being so fidgety that others notice Not at all   Thoughts of being better off dead, or hurting yourself in some way Not at all   PHQ 9 Score 11   How difficult have these problems made it for you to do your work, take care of your home and get along with others Very difficult     CSSRS 1/26/2021 10/13/2020 10/13/2020   1) Within the past month, have you wished you were dead or wished you could go to sleep and not wake up? No No No   2) Have you actually had any thoughts of killing yourself? No No No   6) Have you ever done anything, started to do anything, or prepared to do anything to end your life? No No No         Prior to Admission medications    Medication Sig Start Date End Date Taking? Authorizing Provider   sertraline (ZOLOFT) 50 mg tablet Take 1 Tab by mouth daily. 1/15/21   Ehsan Torres MD   metroNIDAZOLE (FLAGYL) 500 mg tablet TAKE 1 TABLET BY MOUTH TWICE DAILY FOR 7 DAYS 1/6/21   Symone Torres MD   fluconazole (DIFLUCAN) 150 mg tablet TAKE 1 TABLET BY MOUTH EVERY 7 DAYS FOR 21 DAYS 12/7/20   Symone Torres MD   busPIRone (BUSPAR) 10 mg tablet Take 1 Tab by mouth three (3) times daily. 6/12/20   Ehsan Torres MD   fluticasone propionate (FLONASE) 50 mcg/actuation nasal spray SHAKE LIQUID AND USE 2 SPRAYS IN EACH NOSTRIL DAILY 5/4/20   Symone Torres MD   oxybutynin (DITROPAN) 5 mg tablet Take 1 Tab by mouth three (3) times daily as needed (urinary frequency). 2/14/20   Ehsan Torres MD   topiramate (TOPAMAX) 200 mg tablet Take 200 mg by mouth two (2) times a day. 12/10/19   Provider, Historical   cholecalciferol, vitamin D3, (VITAMIN D3 PO) Take 1 Tab by mouth daily. Provider, Historical   cyanocobalamin, vitamin B-12, (VITAMIN B-12) 1,000 mcg/mL drop Take 1-2 Drops by mouth daily. Provider, Historical   levonorgestrel (MIRENA) 20 mcg/24 hours (5 yrs) 52 mg IUD Mirena 20 mcg/24 hours (5 yrs) 52 mg intrauterine device   Take 1 device by intrauterine route.     Provider, Historical     Allergies   Allergen Reactions    Iodine Unknown (comments)     Other reaction(s): anaphylaxis/angioedema  Face throat, swelled after CT in 2001, had MRI w/wo Contrast in 2016 with no problem    Sulfa (Sulfonamide Antibiotics) Swelling    Codeine Hives    Iodinated Contrast Media Itching and Swelling    Pecan Nut Other (comments)     Throat and ears itch    Prednisone Other (comments)    Shellfish Derived Swelling    Sudafed [Pseudoephedrine Hcl] Other (comments)     Has seizure when combined w/ antibotic    Sulfamethoxazole-Trimethoprim Unknown (comments)     Stated had seizure r/t an interaction with seizure medication        Patient Active Problem List    Diagnosis Date Noted    Seizure disorder ([facility] 11/26/2013     Priority: 2 - Two    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    Psychophysiological insomnia 08/08/2020    Panic attacks 08/08/2020    Other constipation 01/19/2020    Endometriosis 05/08/2019    Dysmenorrhea 02/12/2019    Thyroid nodule 02/12/2019    Loose skin 07/27/2016    Allergic reaction 07/27/2016    Abnormal uterine bleeding (AUB) 07/27/2016    Vitamin D deficiency 01/25/2016    Frequent UTI 01/22/2016    CKD (chronic kidney disease) stage 2, GFR 60-89 ml/min 05/13/2015    GARCIA (dyspnea on exertion) 03/03/2014     Past Medical History:   Diagnosis Date    BV (bacterial vaginosis)     Chronic BV    Depression     Epilepsy ([facility] 2001    Dr. Kalyani Russo H/O exercise stress test 11/26/2013    exercise nuc stress test wnl with EF 83%    Hematuria, microscopic 2015    Pelvic floor dysfunction     Recurrent UTI     Seizures (HCC)     Urinary frequency     UTI (urinary tract infection)      Past Surgical History:   Procedure Laterality Date    HX CHOLECYSTECTOMY  2005    HX TUBAL LIGATION  2008    MT LAP,CHOLECYSTECTOMY       Family History   Problem Relation Age of Onset    Thyroid Disease Mother     Alcohol abuse Father     Thyroid Disease Maternal Aunt     Thyroid Disease Maternal Grandmother     Cataract Paternal Grandmother      Social History     Tobacco Use    Smoking status: Never Smoker    Smokeless tobacco: Never Used   Substance Use Topics    Alcohol use: Not Currently       Review of Systems        Objective:   Vital Signs: (As obtained by patient/caregiver at home)  There were no vitals taken for this visit. Physical Exam  Constitutional:       General: She is awake. She is not in acute distress. Appearance: Normal appearance. She is overweight. She is not toxic-appearing or diaphoretic. HENT:      Head: Normocephalic and atraumatic. Nose: Nose normal.   Eyes:      General: No scleral icterus. Extraocular Movements: Extraocular movements intact. Conjunctiva/sclera: Conjunctivae normal.   Neck:      Musculoskeletal: Full passive range of motion without pain. Pulmonary:      Effort: Pulmonary effort is normal. No accessory muscle usage or respiratory distress. Skin:     General: Skin is dry. Neurological:      Mental Status: She is alert. Cranial Nerves: No cranial nerve deficit. Psychiatric:         Attention and Perception: Attention and perception normal.         Mood and Affect: Mood and affect normal.         Speech: Speech normal.         Behavior: Behavior normal. Behavior is cooperative. Cognition and Memory: Cognition and memory normal.           Labs / Results:     Had to ask staff to call lab to request results. Labs drawn 1/27/2021  Tot chol 220 (H)   (H)  Ferritin 18 (range )  CMP wnl  TSH and fT4 wnl  CBC with diff wnl  B12 569 (range 200-1100)  Normal folate  UA unremarkable  Urine culture - no growth  VitD 21 (L) (range )        Mely Bobo is being evaluated by a audio-video encounter to address concerns as mentioned above. A caregiver was present when appropriate.  Due to this being a TeleHealth encounter (During [facility]-14 public health emergency), evaluation of the following organ systems was limited: Chetan Dowell / Joy Gale / Shun Maldonado / Lionel Hollingsworth / Nikkie Stokes / Landy Hightower / Neha Nevarez / Zan Ramsey 87 / Edward Mario / Michelle Canal / Heme-Lymph-Imm. Pursuant to the emergency declaration under the [facility], [address] [place] [address] authority and the [employer] Act, this Virtual Visit was conducted with patient's (and/or legal guardian's) consent, to reduce the patient's risk of exposure to COVID-19 and provide necessary medical care. The patient (and/or legal guardian) has also been advised to contact this office for worsening conditions or problems, and seek emergency medical treatment and/or call 911 if deemed necessary. Patient identification was verified at the start of the visit: YES    Services were provided through a synchronous discussion virtually to substitute for in-person clinic visit. Patient was located at home and provider was located in office or at home. An electronic signature was used to authenticate this note.     Hemal Beckford MD  Internal Medicine, Family Medicine & Sports Medicine

## 2021-02-12 ENCOUNTER — OFFICE VISIT (OUTPATIENT)
Dept: FAMILY MEDICINE CLINIC | Age: 41
End: 2021-02-12
Payer: COMMERCIAL

## 2021-02-12 VITALS
HEIGHT: 64 IN | HEART RATE: 87 BPM | TEMPERATURE: 97.2 F | WEIGHT: 185.2 LBS | OXYGEN SATURATION: 98 % | SYSTOLIC BLOOD PRESSURE: 99 MMHG | DIASTOLIC BLOOD PRESSURE: 65 MMHG | BODY MASS INDEX: 31.62 KG/M2 | RESPIRATION RATE: 16 BRPM

## 2021-02-12 DIAGNOSIS — F41.8 ANXIOUS DEPRESSION: ICD-10-CM

## 2021-02-12 DIAGNOSIS — E66.09 CLASS 1 OBESITY DUE TO EXCESS CALORIES WITH BODY MASS INDEX (BMI) OF 31.0 TO 31.9 IN ADULT, UNSPECIFIED WHETHER SERIOUS COMORBIDITY PRESENT: Primary | ICD-10-CM

## 2021-02-12 DIAGNOSIS — R79.0 LOW FERRITIN: ICD-10-CM

## 2021-02-12 DIAGNOSIS — G40.909 SEIZURE DISORDER (HCC): ICD-10-CM

## 2021-02-12 DIAGNOSIS — E55.9 VITAMIN D DEFICIENCY: ICD-10-CM

## 2021-02-12 DIAGNOSIS — Z23 NEEDS FLU SHOT: ICD-10-CM

## 2021-02-12 DIAGNOSIS — R53.83 FATIGUE, UNSPECIFIED TYPE: ICD-10-CM

## 2021-02-12 DIAGNOSIS — L85.3 DRY SKIN DERMATITIS: ICD-10-CM

## 2021-02-12 PROCEDURE — 90686 IIV4 VACC NO PRSV 0.5 ML IM: CPT | Performed by: FAMILY MEDICINE

## 2021-02-12 PROCEDURE — 90471 IMMUNIZATION ADMIN: CPT | Performed by: FAMILY MEDICINE

## 2021-02-12 PROCEDURE — 99215 OFFICE O/P EST HI 40 MIN: CPT | Performed by: FAMILY MEDICINE

## 2021-02-12 NOTE — PROGRESS NOTES
Note to patient:  The purpose of this note is to communicate optimally with the other physicians / APCs involved in your care. It is written using standard medical terminology. If you have questions regarding the details of the note, please contact my office to schedule an appointment to address your questions. Ragini Romero  Primary Care Office Visit - Problem-Oriented    : 1980   Yefri Vargas is a 41 y.o. female presenting for  Chief Complaint   Patient presents with    Skin Problem     itching     Bleeding/Bruising    Fatigue    Dizziness     after eating sweets     Immunization/Injection     FLU            Assessment/Plan:           ICD-10-CM ICD-9-CM   1. Class 1 obesity due to excess calories with body mass index (BMI) of 31.0 to 31.9 in adult, unspecified whether serious comorbidity present  E66.09 278.00    Z68.31 V85.31   2. Fatigue, unspecified type  R53.83 780.79   3. Vitamin D deficiency  E55.9 268.9   4. Anxious depression  F41.8 300.4   5. Seizure disorder ([facility]  G40.909 345.90   6. Low ferritin  R79.0 790.6   7. Dry skin dermatitis  L85.3 692.89   8. Needs flu shot  Z23 V04.81       Orders Placed This Encounter    Influenza Virus Vaccine QUAD, PF Syr 6 Months + (Flulaval, Fluarix [id number])       Counseled at great length (>30min f2f) re: weight loss strategies / opportunities. See notes below  Not a good candidate for most pharmacotherapy given sz disorder medications    Anxious depression is ongoing  No change to current medication  > positive reinforcement given for continuing to seek other job opportunities        # itching  - optimize skin moisturizing, particularly after a hot shower (however less hot showers might be helpful too)  - Cereve, Aquaphor  - I sent Rx for Eucerin to your mail order pharmacy, hoping they will cover      # weight loss  - www. dietdoctor. com  - consider \"lower carb\" choices  - behavioral modifications for getting comfortable with cravings / hunger  - intermittent fasting I will say, is very convenient, because it involves essentially zero planning      # continue High dose vitamin D weekly. .. indefinitely      # work on increasing iron in diet. ... either with every other day OTC iron, or using marcelino iron fish while cooking        On this date 2/12/2021, I have spent 60 minutes providing care to this patient, which included reviewing the EMR to see if there were any recent visits to the ED, specialists, prior lab or radiology results, obtaining the history from the patient, examining the patient, providing discharge education regarding the diagnosis and counseling on appropriate follow-up, as well as documenting this visit in the EMR. This document may have been created with the aid of dictation software. Text may contain errors, particularly phonetic errors. Reviewed management plan & instructions with patient, who voiced understanding. Abby Hardin MD  Internal Medicine, Family Medicine & Sports Medicine  2/12/2021    Subjective   History:   Gregoria Cooper is a 41 y.o. female presenting to address:  Chief Complaint   Patient presents with    Skin Problem     itching     Bleeding/Bruising    Fatigue    Dizziness     after eating sweets     Immunization/Injection     FLU       Last VV: 2/1/2021    Diffuse itching. Notes dry skin. Feels tired all the time. Notes dizziness after sweets. Most concerned about weight gain. Have started changing the way she eats. Walking her new dog in the morning. Still unhappy at work. Currently limited to 35h/week. Looking for other employment. Compliant with meds, denies any AE. HOLLY 2/7 2/12/2021 1/26/2021 10/13/2020   Feeling nervous, anxious or on edge? 0 1 2   Not being able to stop or control worrying? 2 0 3   HOLLY-2 Subtotal 2 - 5   Worrying too much about different things? 3 1 3   Trouble relaxing? 0 1 1   Being so restless that it is hard to sit still?  1 0 2   Becoming easily annoyed or irritable? 2 3 2   Feeling afraid as if something awful might happen? 0 1 1   HOLLY-7 Total Score 8 7 14   If you checked off any problems, how difficult have these problems made it for you to do your work, take care of thinks at home, or get along with other people? Very difficult Somewhat difficult Extremely difficult       3 most recent PHQ Screens 2/12/2021   Little interest or pleasure in doing things More than half the days   Feeling down, depressed, irritable, or hopeless Several days   Total Score PHQ 2 3   Trouble falling or staying asleep, or sleeping too much Nearly every day   Feeling tired or having little energy Nearly every day   Poor appetite, weight loss, or overeating Nearly every day   Feeling bad about yourself - or that you are a failure or have let yourself or your family down More than half the days   Trouble concentrating on things such as school, work, reading, or watching TV More than half the days   Moving or speaking so slowly that other people could have noticed; or the opposite being so fidgety that others notice Not at all   Thoughts of being better off dead, or hurting yourself in some way Not at all   PHQ 9 Score 16   How difficult have these problems made it for you to do your work, take care of your home and get along with others Extremely difficult                 Past Medical History:   Diagnosis Date    BV (bacterial vaginosis)     Chronic BV    Depression     Epilepsy ([facility] 2001    Dr. Butch Dyson H/O exercise stress test 11/26/2013    exercise nuc stress test wnl with EF 83%    Hematuria, microscopic 2015    Pelvic floor dysfunction     Recurrent UTI     Seizures ([facility]     Urinary frequency     UTI (urinary tract infection)      Past Surgical History:   Procedure Laterality Date    HX CHOLECYSTECTOMY  2005    HX TUBAL LIGATION  2008    CA LAP,CHOLECYSTECTOMY        reports that she has never smoked.  She has never used smokeless tobacco. She reports previous alcohol use. She reports previous drug use. Social History     Social History Narrative    Works at [employer].      Social History     Tobacco Use   Smoking Status Never Smoker   Smokeless Tobacco Never Used     Family History   Problem Relation Age of Onset    Thyroid Disease Mother     Alcohol abuse Father     Thyroid Disease Maternal Aunt     Thyroid Disease Maternal Grandmother     Cataract Paternal Grandmother      Allergies   Allergen Reactions    Iodine Unknown (comments)     Other reaction(s): anaphylaxis/angioedema  Face throat, swelled after CT in 2001, had MRI w/wo Contrast in 2016 with no problem    Sulfa (Sulfonamide Antibiotics) Swelling    Codeine Hives    Iodinated Contrast Media Itching and Swelling    Pecan Nut Other (comments)     Throat and ears itch    Prednisone Other (comments)    Shellfish Derived Swelling    Sudafed [Pseudoephedrine Hcl] Other (comments)     Has seizure when combined w/ antibotic    Sulfamethoxazole-Trimethoprim Unknown (comments)     Stated had seizure r/t an interaction with seizure medication        Problem List:      Patient Active Problem List    Diagnosis    Seizure disorder ([facility]     -- 6/1/2016 [neuro; Dr. Lissa Quesada: request previous record, consider EEG, increase topamax to 150mg BID; f/u 3-4mo    -- 10/7/2014 [neuro; Dr. Radha Willard: continue topamax 100mg BID, B12 1000mcg daily, triptans prn      Anxious depression    Psychophysiological insomnia    Panic attacks    Other constipation    Endometriosis    Dysmenorrhea    Thyroid nodule    Loose skin    Allergic reaction    Abnormal uterine bleeding (AUB)    Vitamin D deficiency     - VitD 7.9 (L) (1/25/2016); start 50k 2x/wk x 12wks, then 4000 units daily      Frequent UTI    CKD (chronic kidney disease) stage 2, GFR 60-89 ml/min     - eGFR 75 (5/5/2015) [obtained by neurology]      GARCIA (dyspnea on exertion)     -- 2/11/2014: per Dr. Becca Singleton, possible exercise induced asthma. Medications:     Current Outpatient Medications   Medication Sig    ergocalciferol (ERGOCALCIFEROL) 1,250 mcg (50,000 unit) capsule Take 1 Cap by mouth every seven (7) days.  ferrous sulfate 325 mg (65 mg iron) tablet Take 1 Tab by mouth every other day.  sertraline (ZOLOFT) 50 mg tablet Take 1 Tab by mouth daily.  busPIRone (BUSPAR) 10 mg tablet Take 1 Tab by mouth three (3) times daily.  fluticasone propionate (FLONASE) 50 mcg/actuation nasal spray SHAKE LIQUID AND USE 2 SPRAYS IN EACH NOSTRIL DAILY    topiramate (TOPAMAX) 200 mg tablet Take 200 mg by mouth two (2) times a day.  levonorgestrel (MIRENA) 20 mcg/24 hours (5 yrs) 52 mg IUD Mirena 20 mcg/24 hours (5 yrs) 52 mg intrauterine device   Take 1 device by intrauterine route.  metroNIDAZOLE (FLAGYL) 500 mg tablet TAKE 1 TABLET BY MOUTH TWICE DAILY FOR 7 DAYS    fluconazole (DIFLUCAN) 150 mg tablet TAKE 1 TABLET BY MOUTH EVERY 7 DAYS FOR 21 DAYS    oxybutynin (DITROPAN) 5 mg tablet Take 1 Tab by mouth three (3) times daily as needed (urinary frequency).  cholecalciferol, vitamin D3, (VITAMIN D3 PO) Take 1 Tab by mouth daily.  cyanocobalamin, vitamin B-12, (VITAMIN B-12) 1,000 mcg/mL drop Take 1-2 Drops by mouth daily. No current facility-administered medications for this visit. Review of Systems:     Review of Systems           Objective   Physical Assessment:   VS:    Vitals:    02/12/21 1141   BP: 99/65   Pulse: 87   Resp: 16   Temp: 97.2 °F (36.2 °C)   TempSrc: Temporal   SpO2: 98%   Weight: 185 lb 3.2 oz (84 kg)   Height: 5' 4\" (1.626 m)   PainSc:   0 - No pain       Physical Exam  Nursing note reviewed. Constitutional:       Appearance: She is well-developed. She is obese. She is not diaphoretic. HENT:      Head: Normocephalic and atraumatic. Nose:      Comments: Mask in place  Neck:      Musculoskeletal: Neck supple. Thyroid: No thyromegaly.    Cardiovascular:      Rate and Rhythm: Normal rate and regular rhythm. Heart sounds: No murmur. Pulmonary:      Effort: Pulmonary effort is normal.      Breath sounds: Normal breath sounds. No wheezing or rales. Musculoskeletal: Normal range of motion. Skin:     General: Skin is warm and dry. Neurological:      Mental Status: She is alert and oriented to person, place, and time. Cranial Nerves: No cranial nerve deficit. Coordination: Coordination normal.   Psychiatric:         Behavior: Behavior normal.         Thought Content: Thought content normal.         Judgment: Judgment normal.         Records Review     Neurology (2/11/2021):    Assessment:   Betty Cabrera is a 41 y.o. Black female with a PMHx of: migraine (topamax), seizure (last Oct 2019---on topamax), and neck/ back pain (saw pain management for injections in the past, that did not help) whom presents solo in follow up for HA and seizure. She has fatigue and questions if any neurologic basis? Noted labs essentially normal sent from PCP (copy in EMR). Exam non focal. Offered updated imaging studies to ensure no tumour (e.g. pituitary) or signs of demyelination, but this was declined. Review:  (G40.209) Complex partial seizure evolving to generalized seizure ([facility]    (G43.009) Migraine without aura and without status migrainosus, not intractable    (M79.18) Myofascial pain    (R53.83) Other fatigue    Plan:   Continue topamax (noted has hx of tubal ligation)  -above for seizure and HA  -if seizure, no driving 6 months and call 911  Has parafon forte available, encouraged patient to utilize/try   If worsening symptoms, will reassess and reconsider updated imaging studies. Again assured patient normal exam in office.

## 2021-02-12 NOTE — PROGRESS NOTES
Carolyn Anguiano is a 41 y.o. female (: 1980) presenting to address:    Chief Complaint   Patient presents with    Skin Problem     itching     Bleeding/Bruising    Fatigue    Dizziness     after eating sweets     Immunization/Injection     FLU     Flu Immunization/s administered 2021 by Fidencio Apodaca LPN with patients consent. Patient tolerated procedure well. No reactions noted.         Vitals:    21 1141   BP: 99/65   Pulse: 87   Resp: 16   Temp: 97.2 °F (36.2 °C)   TempSrc: Temporal   SpO2: 98%   Weight: 185 lb 3.2 oz (84 kg)   Height: 5' 4\" (1.626 m)   PainSc:   0 - No pain       Hearing/Vision:   No exam data present    Learning Assessment:     Learning Assessment 2016   PRIMARY LEARNER Patient   HIGHEST LEVEL OF EDUCATION - PRIMARY LEARNER  4 YEARS OF COLLEGE   BARRIERS PRIMARY LEARNER NONE   CO-LEARNER CAREGIVER No   PRIMARY LANGUAGE ENGLISH   LEARNER PREFERENCE PRIMARY VIDEOS     LISTENING   ANSWERED BY self   RELATIONSHIP SELF     Depression Screening:     3 most recent PHQ Screens 2021   Little interest or pleasure in doing things More than half the days   Feeling down, depressed, irritable, or hopeless Several days   Total Score PHQ 2 3   Trouble falling or staying asleep, or sleeping too much Nearly every day   Feeling tired or having little energy Nearly every day   Poor appetite, weight loss, or overeating Nearly every day   Feeling bad about yourself - or that you are a failure or have let yourself or your family down More than half the days   Trouble concentrating on things such as school, work, reading, or watching TV More than half the days   Moving or speaking so slowly that other people could have noticed; or the opposite being so fidgety that others notice Not at all   Thoughts of being better off dead, or hurting yourself in some way Not at all   PHQ 9 Score 16   How difficult have these problems made it for you to do your work, take care of your home and get along with others Extremely difficult     Fall Risk Assessment:     Fall Risk Assessment, last 12 mths 2/12/2021   Able to walk? Yes   Fall in past 12 months? 0   Do you feel unsteady? 0   Are you worried about falling 0     Abuse Screening:     Abuse Screening Questionnaire 11/1/2019   Do you ever feel afraid of your partner? N   Are you in a relationship with someone who physically or mentally threatens you? N   Is it safe for you to go home? Y     Coordination of Care Questionaire:   1. Have you been to the ER, urgent care clinic since your last visit? Hospitalized since your last visit? NO    2. Have you seen or consulted any other health care providers outside of the [address] [place] [address] since your last visit? Include any pap smears or colon screening. YES Dr. Naaman Bence Neruologerika    Advanced Directive:   1. Do you have an Advanced Directive? NO    2. Would you like information on Advanced Directives?  NO

## 2021-02-18 ENCOUNTER — TELEPHONE (OUTPATIENT)
Dept: FAMILY MEDICINE CLINIC | Age: 41
End: 2021-02-18

## 2021-02-18 NOTE — TELEPHONE ENCOUNTER
Can she get us the information for the formulary for her specific plan? If so, I can take a look. Otherwise, we would have no idea how to find an alternative that is okay with her insurance.

## 2021-02-18 NOTE — TELEPHONE ENCOUNTER
Patient called stating that her HR department received the return to work authorization and wants more details to know patient is only released to restricted duties if there are no physical limitations and has reached maximum medical improvement. Please advise.

## 2021-02-18 NOTE — TELEPHONE ENCOUNTER
Per patient Eucerin cream was denied by [employer] mail order patient is wanting to know if there is an alternative

## 2021-03-02 DIAGNOSIS — N89.8 VAGINAL IRRITATION: ICD-10-CM

## 2021-03-03 RX ORDER — FLUCONAZOLE 150 MG/1
TABLET ORAL
Qty: 3 TAB | Refills: 1 | Status: SHIPPED | OUTPATIENT
Start: 2021-03-03 | End: 2021-04-02 | Stop reason: SDUPTHER

## 2021-03-03 NOTE — TELEPHONE ENCOUNTER
Last seen 2/12/21    Last filled 12/7/20 qty 3 w/ 1 refill    Future Appointments   Date Time Provider Santiago Naqvi   4/12/2021 11:00 AM Rosa Torres MD [facility]

## 2021-03-04 NOTE — TELEPHONE ENCOUNTER
pls fax updated form (no restrictions), and inform Debbi Clancy that it was sent.       Ms Simmonsleonela number is [phone]  Fax [phone] and [phone]

## 2021-03-23 ENCOUNTER — VIRTUAL VISIT (OUTPATIENT)
Dept: FAMILY MEDICINE CLINIC | Age: 41
End: 2021-03-23
Payer: COMMERCIAL

## 2021-03-23 DIAGNOSIS — F41.9 ANXIETY: ICD-10-CM

## 2021-03-23 PROCEDURE — 99214 OFFICE O/P EST MOD 30 MIN: CPT | Performed by: FAMILY MEDICINE

## 2021-03-23 RX ORDER — BUSPIRONE HYDROCHLORIDE 10 MG/1
10 TABLET ORAL 3 TIMES DAILY
Qty: 90 TAB | Refills: 1
Start: 2021-03-23

## 2021-03-23 NOTE — PROGRESS NOTES
Note to patient:  The purpose of this note is to communicate optimally with the other physicians / APCs involved in your care. It is written using standard medical terminology. If you have questions regarding the details of the note, please contact my office to schedule an appointment to address your questions. [address]   Primary Care Office Visit - Telemedicine Problem-Oriented Note    Assessment & Plan:       ICD-10-CM ICD-9-CM   1. Anxiety  F41.9 300.00     Increased anxiety 2/2 increased stressors. > .no change to current regimen. In fact, may increas buspirone to 40mg/day. > since she is pursuing medical marijuana in a legal fashion, encouraged her to emphasize her self-care  > encouraged revisiting her Personal Nora Matthews of Rights    Future Appointments   Date Time Provider Santiago Donya   4/12/2021 11:00 AM Stas Torres MD [facility]         Orders Placed This Encounter    busPIRone (BUSPAR) 10 mg tablet     Sig: Take 1 Tab by mouth three (3) times daily. May take an additional dose (max of 40mg/day) as needed. Dispense:  90 Tab     Refill:  1       On this date 3/23/2021, I have spent 37 minutes providing care to this patient, which included reviewing the EMR to see if there were any recent visits to the ED, specialists, prior lab or radiology results, obtaining the history from the patient, examining the patient, providing discharge education regarding the diagnosis and counseling on appropriate follow-up, as well as documenting this visit in the EMR. 712  Subjective:   Gregoria Cooper is a 41 y.o. female who was seen for Anxiety    \"my family is driving me nuts\"  Eldest son was living with her from Aug 2020 to Mar 2021, and recently moved out, but only after causing some increased financial issues (damaged car). Has 14yo and 20yo. Notes that her mother & sister(s) are \"triggers\" as well.   Actually left a family function early the other day because of disagreements, and notes that her 11yo daughter \"reported that they were saying not nice things about me in her presence\". Current anxiety sx: episodic chest discomfort, not assoc with activity    Has increased her buspirone 10mg TID recently  Compliant with zoloft 50mg  Got medical card, but hasn't gone to the facility. HOLLY 2/7 3/23/2021 2/12/2021 1/26/2021   Feeling nervous, anxious or on edge? 2 0 1   Not being able to stop or control worrying? 3 2 0   HOLLY-2 Subtotal 5 2 -   Worrying too much about different things? 3 3 1   Trouble relaxing? 2 0 1   Being so restless that it is hard to sit still? 0 1 0   Becoming easily annoyed or irritable? 2 2 3   Feeling afraid as if something awful might happen? 1 0 1   HOLLY-7 Total Score 13 8 7   If you checked off any problems, how difficult have these problems made it for you to do your work, take care of thinks at home, or get along with other people?   - Very difficult Somewhat difficult       3 most recent PHQ Screens 3/23/2021   Little interest or pleasure in doing things More than half the days   Feeling down, depressed, irritable, or hopeless More than half the days   Total Score PHQ 2 4   Trouble falling or staying asleep, or sleeping too much Nearly every day   Feeling tired or having little energy Nearly every day   Poor appetite, weight loss, or overeating More than half the days   Feeling bad about yourself - or that you are a failure or have let yourself or your family down More than half the days   Trouble concentrating on things such as school, work, reading, or watching TV Several days   Moving or speaking so slowly that other people could have noticed; or the opposite being so fidgety that others notice Not at all   Thoughts of being better off dead, or hurting yourself in some way Not at all   PHQ 9 Score 15   How difficult have these problems made it for you to do your work, take care of your home and get along with others Extremely difficult         Prior to Admission medications    Medication Sig Start Date End Date Taking? Authorizing Provider   fluconazole (DIFLUCAN) 150 mg tablet TAKE 1 TABLET BY MOUTH EVERY 7 DAYS FOR 21 DAYS 3/3/21   Symone Torres MD   glycerin-mineral oil-lanolin (EUCERIN PLUS) topical cream Apply  to affected area as needed for Dry Skin. 2/12/21   Dianna Torres MD   ergocalciferol (ERGOCALCIFEROL) 1,250 mcg (50,000 unit) capsule Take 1 Cap by mouth every seven (7) days. 2/1/21   Dianna Torres MD   ferrous sulfate 325 mg (65 mg iron) tablet Take 1 Tab by mouth every other day. 2/1/21   Dianna Torres MD   sertraline (ZOLOFT) 50 mg tablet Take 1 Tab by mouth daily. 1/15/21   Dianna Torres MD   metroNIDAZOLE (FLAGYL) 500 mg tablet TAKE 1 TABLET BY MOUTH TWICE DAILY FOR 7 DAYS 1/6/21   Symone Torres MD   busPIRone (BUSPAR) 10 mg tablet Take 1 Tab by mouth three (3) times daily. 6/12/20   Dianna Torres MD   fluticasone propionate (FLONASE) 50 mcg/actuation nasal spray SHAKE LIQUID AND USE 2 SPRAYS IN EACH NOSTRIL DAILY 5/4/20   Symone Torres MD   oxybutynin (DITROPAN) 5 mg tablet Take 1 Tab by mouth three (3) times daily as needed (urinary frequency). 2/14/20   Dianna Torres MD   topiramate (TOPAMAX) 200 mg tablet Take 200 mg by mouth two (2) times a day. 12/10/19   Provider, Historical   cholecalciferol, vitamin D3, (VITAMIN D3 PO) Take 1 Tab by mouth daily. Provider, Historical   cyanocobalamin, vitamin B-12, (VITAMIN B-12) 1,000 mcg/mL drop Take 1-2 Drops by mouth daily. Provider, Historical   levonorgestrel (MIRENA) 20 mcg/24 hours (5 yrs) 52 mg IUD Mirena 20 mcg/24 hours (5 yrs) 52 mg intrauterine device   Take 1 device by intrauterine route.     Provider, Historical     Allergies   Allergen Reactions    Iodine Unknown (comments)     Other reaction(s): anaphylaxis/angioedema  Face throat, swelled after CT in 2001, had MRI w/wo Contrast in 2016 with no problem    Sulfa (Sulfonamide Antibiotics) Swelling    Codeine Hives    Iodinated Contrast Media Itching and Swelling    Pecan Nut Other (comments)     Throat and ears itch    Prednisone Other (comments)    Shellfish Derived Swelling    Sudafed [Pseudoephedrine Hcl] Other (comments)     Has seizure when combined w/ antibotic    Sulfamethoxazole-Trimethoprim Unknown (comments)     Stated had seizure r/t an interaction with seizure medication        Patient Active Problem List    Diagnosis Date Noted    Seizure disorder ([facility] 11/26/2013     Priority: 2 - Two    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    Psychophysiological insomnia 08/08/2020    Panic attacks 08/08/2020    Other constipation 01/19/2020    Endometriosis 05/08/2019    Dysmenorrhea 02/12/2019    Thyroid nodule 02/12/2019    Loose skin 07/27/2016    Allergic reaction 07/27/2016    Abnormal uterine bleeding (AUB) 07/27/2016    Vitamin D deficiency 01/25/2016    Frequent UTI 01/22/2016    CKD (chronic kidney disease) stage 2, GFR 60-89 ml/min 05/13/2015    GARCIA (dyspnea on exertion) 03/03/2014     Past Medical History:   Diagnosis Date    BV (bacterial vaginosis)     Chronic BV    Depression     Epilepsy ([facility] 2001    Dr. Niall Roth H/O exercise stress test 11/26/2013    exercise nuc stress test wnl with EF 83%    Hematuria, microscopic 2015    Pelvic floor dysfunction     Recurrent UTI     Seizures (HCC)     Urinary frequency     UTI (urinary tract infection)      Past Surgical History:   Procedure Laterality Date    HX CHOLECYSTECTOMY  2005    HX TUBAL LIGATION  2008    MT LAP,CHOLECYSTECTOMY       Family History   Problem Relation Age of Onset    Thyroid Disease Mother     Alcohol abuse Father     Thyroid Disease Maternal Aunt     Thyroid Disease Maternal Grandmother     Cataract Paternal Grandmother      Social History     Tobacco Use    Smoking status: Never Smoker    Smokeless tobacco: Never Used   Substance Use Topics    Alcohol use: Not Currently       Review of Systems        Objective:   Vital Signs: (As obtained by patient/caregiver at home)  There were no vitals taken for this visit.  Physical Exam  Constitutional:       General: She is awake. She is not in acute distress.     Appearance: She is not toxic-appearing or diaphoretic.   HENT:      Head: Normocephalic and atraumatic.      Nose: Nose normal.   Eyes:      General: No scleral icterus.     Extraocular Movements: Extraocular movements intact.      Conjunctiva/sclera: Conjunctivae normal.   Neck:      Musculoskeletal: Full passive range of motion without pain.   Pulmonary:      Effort: Pulmonary effort is normal. No accessory muscle usage or respiratory distress.   Skin:     General: Skin is dry.   Neurological:      Mental Status: She is alert.      Cranial Nerves: No cranial nerve deficit.   Psychiatric:         Attention and Perception: Attention and perception normal.         Mood and Affect: Mood and affect normal.         Speech: Speech normal.         Behavior: Behavior normal. Behavior is cooperative.         Cognition and Memory: Cognition and memory normal.                Mely Duff is being evaluated by a audio-video encounter to address concerns as mentioned above.  A caregiver was present when appropriate. Due to this being a TeleHealth encounter (During COVID-19 public health emergency), evaluation of the following organ systems was limited: Vitals / Constitutional / EENT / Resp / CV / GI /  / MS / Neuro / Skin / Heme-Lymph-Imm.      Pursuant to the emergency declaration under the Glaser Act and the National Emergencies Act, 1135 waiver authority and the Coronavirus Preparedness and Response Supplemental Appropriations Act, this Virtual Visit was conducted with patient's (and/or legal guardian's) consent, to reduce the patient's risk of exposure to COVID-19 and provide necessary medical care.  The patient (and/or legal guardian) has also been  advised to contact this office for worsening conditions or problems, and seek emergency medical treatment and/or call 911 if deemed necessary. Patient identification was verified at the start of the visit: YES    Services were provided through a synchronous discussion virtually to substitute for in-person clinic visit. Patient was located at home and provider was located in office or at home. An electronic signature was used to authenticate this note.     Tiffany Monroy MD  Internal Medicine, Family Medicine & Sports Medicine

## 2021-04-02 DIAGNOSIS — E55.9 VITAMIN D DEFICIENCY: ICD-10-CM

## 2021-04-02 DIAGNOSIS — N89.8 VAGINAL IRRITATION: ICD-10-CM

## 2021-04-02 RX ORDER — ERGOCALCIFEROL 1.25 MG/1
50000 CAPSULE ORAL
Qty: 12 CAP | Refills: 1 | Status: CANCELLED | OUTPATIENT
Start: 2021-04-02

## 2021-04-02 RX ORDER — FLUCONAZOLE 150 MG/1
TABLET ORAL
Qty: 3 TAB | Refills: 1 | Status: SHIPPED | OUTPATIENT
Start: 2021-04-02 | End: 2021-04-12 | Stop reason: SDUPTHER

## 2021-04-02 NOTE — TELEPHONE ENCOUNTER
Called pt. No answer. She had requested refill of vit D but has a refill at the pharmacy. 3/23/2021 vv.

## 2021-04-03 ENCOUNTER — DOCUMENTATION ONLY (OUTPATIENT)
Dept: FAMILY MEDICINE CLINIC | Age: 41
End: 2021-04-03

## 2021-04-06 ENCOUNTER — E-VISIT (OUTPATIENT)
Dept: FAMILY MEDICINE CLINIC | Age: 41
End: 2021-04-06

## 2021-04-12 ENCOUNTER — VIRTUAL VISIT (OUTPATIENT)
Dept: FAMILY MEDICINE CLINIC | Age: 41
End: 2021-04-12
Payer: COMMERCIAL

## 2021-04-12 VITALS — WEIGHT: 180 LBS | BODY MASS INDEX: 29.99 KG/M2 | HEIGHT: 65 IN

## 2021-04-12 DIAGNOSIS — F41.8 ANXIOUS DEPRESSION: ICD-10-CM

## 2021-04-12 DIAGNOSIS — N89.8 VAGINAL IRRITATION: ICD-10-CM

## 2021-04-12 PROCEDURE — 99214 OFFICE O/P EST MOD 30 MIN: CPT | Performed by: FAMILY MEDICINE

## 2021-04-12 RX ORDER — SERTRALINE HYDROCHLORIDE 50 MG/1
50 TABLET, FILM COATED ORAL DAILY
Qty: 90 TAB | Refills: 3 | Status: SHIPPED | OUTPATIENT
Start: 2021-04-12 | End: 2021-07-08

## 2021-04-12 RX ORDER — FLUCONAZOLE 150 MG/1
150 TABLET ORAL
Qty: 4 TAB | Refills: 1 | Status: SHIPPED | OUTPATIENT
Start: 2021-04-12 | End: 2021-05-04

## 2021-04-12 NOTE — PROGRESS NOTES
Riccardo Gordon is a 41 y.o. female (: 1980) presenting to address:    Chief Complaint   Patient presents with    Medication Evaluation       Vitals:    21 0700   Weight: 180 lb (81.6 kg)   Height: 5' 4.5\" (1.638 m)       Is someone accompanying this pt? NO    Is the patient using any DME equipment during OV? NO    Hearing/Vision:   No exam data present    Learning Assessment:     Learning Assessment 2016   PRIMARY LEARNER Patient   HIGHEST LEVEL OF EDUCATION - PRIMARY LEARNER  4 YEARS OF COLLEGE   BARRIERS PRIMARY LEARNER NONE   CO-LEARNER CAREGIVER No   PRIMARY LANGUAGE ENGLISH   LEARNER PREFERENCE PRIMARY VIDEOS     LISTENING   ANSWERED BY self   RELATIONSHIP SELF     Depression Screening:     3 most recent PHQ Screens 2021   Little interest or pleasure in doing things Not at all   Feeling down, depressed, irritable, or hopeless More than half the days   Total Score PHQ 2 2   Trouble falling or staying asleep, or sleeping too much -   Feeling tired or having little energy -   Poor appetite, weight loss, or overeating -   Feeling bad about yourself - or that you are a failure or have let yourself or your family down -   Trouble concentrating on things such as school, work, reading, or watching TV -   Moving or speaking so slowly that other people could have noticed; or the opposite being so fidgety that others notice -   Thoughts of being better off dead, or hurting yourself in some way -   PHQ 9 Score -   How difficult have these problems made it for you to do your work, take care of your home and get along with others -     Fall Risk Assessment:     Fall Risk Assessment, last 12 mths 2021   Able to walk? Yes   Fall in past 12 months? 0   Do you feel unsteady? 0   Are you worried about falling 0     Coordination of Care Questionaire:   1. Have you been to the ER, urgent care clinic since your last visit? Hospitalized since your last visit? NO    2.  Have you seen or consulted any other health care providers outside of the [address] [place] [address] since your last visit? Include any pap smears or colon screening. NO    Advanced Directive:   1. Do you have an Advanced Directive? NO    2. Would you like information on Advanced Directives?  NO

## 2021-04-12 NOTE — PROGRESS NOTES
Note to patient:  The purpose of this note is to communicate optimally with the other physicians / APCs involved in your care. It is written using standard medical terminology. If you have questions regarding the details of the note, please contact my office to schedule an appointment to address your questions. [address]   Primary Care Office Visit - Telemedicine Problem-Oriented Note    Assessment & Plan:       ICD-10-CM ICD-9-CM   1. Vaginal irritation  N89.8 623.9   2. 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 > encourage self-care  > no change to current regimen  Key Psychotherapeutic Meds             sertraline (ZOLOFT) 50 mg tablet (Taking) Take 1 Tab by mouth daily. busPIRone (BUSPAR) 10 mg tablet (Taking) Take 1 Tab by mouth three (3) times daily. May take an additional dose (max of 40mg/day) as needed. Orders Placed This Encounter    fluconazole (DIFLUCAN) 150 mg tablet     Sig: Take 1 Tab by mouth every seven (7) days for 4 doses. FDA advises cautious prescribing of oral fluconazole in pregnancy. Dispense:  4 Tab     Refill:  1    sertraline (ZOLOFT) 50 mg tablet     Sig: Take 1 Tab by mouth daily. Dispense:  90 Tab     Refill:  3       Future Appointments   Date Time Provider Snatiago Naqvi   5/18/2021 11:00 AM Anupama Torres MD [facility] BS AMB         On this date 4/12/2021, I have spent 32 minutes providing care to this patient, which included reviewing the EMR to see if there were any recent visits to the ED, specialists, prior lab or radiology results, obtaining the history from the patient, examining the patient, providing discharge education regarding the diagnosis and counseling on appropriate follow-up, as well as documenting this visit in the EMR.         712  Subjective:   Lieutenant Baez is a 41 y.o. female who was seen for Medication Evaluation      Urinary discomfort  Increased water intake, using some over-the-counter d-mannose and elderberry. anxiety  Work has been challenging. Also distancing a bit from her family that she finds less than supportive. Discussed this with her  Dariel Dasilva of separation from a lot of people right now\". Currently has an empty house as her dtr went to hang out with her 1/2 sister, and her dog went in-training. Did get to the dispensary, and has tried a few edibles, as well as vape and tincture drops. She does find this helpful. Did use buspirone over the weekend due chest pain. Prior to Admission medications    Medication Sig Start Date End Date Taking? Authorizing Provider   fluconazole (DIFLUCAN) 150 mg tablet Take 1 Tab by mouth every seven (7) days for 4 doses. FDA advises cautious prescribing of oral fluconazole in pregnancy. 4/12/21 5/4/21 Yes Patricia العلي MD   sertraline (ZOLOFT) 50 mg tablet Take 1 Tab by mouth daily. 4/12/21  Yes Patricia العلي MD   busPIRone (BUSPAR) 10 mg tablet Take 1 Tab by mouth three (3) times daily. May take an additional dose (max of 40mg/day) as needed. 3/23/21  Yes Symone Torres MD   ferrous sulfate 325 mg (65 mg iron) tablet Take 1 Tab by mouth every other day. 2/1/21  Yes Patricia العلي MD   fluticasone propionate (FLONASE) 50 mcg/actuation nasal spray SHAKE LIQUID AND USE 2 SPRAYS IN EACH NOSTRIL DAILY 5/4/20  Yes Symone Torres MD   topiramate (TOPAMAX) 200 mg tablet Take 200 mg by mouth two (2) times a day. 12/10/19  Yes Provider, Historical   cholecalciferol, vitamin D3, (VITAMIN D3 PO) Take 1 Tab by mouth daily. Yes Provider, Historical   levonorgestrel (MIRENA) 20 mcg/24 hours (5 yrs) 52 mg IUD Mirena 20 mcg/24 hours (5 yrs) 52 mg intrauterine device   Take 1 device by intrauterine route.    Yes Provider, Historical   metroNIDAZOLE (FLAGYL) 500 mg tablet TAKE 1 TABLET BY MOUTH TWICE DAILY FOR 7 DAYS 4/22/21   Floresita Ontiveros MD MAR   glycerin-mineral oil-lanolin (EUCERIN PLUS) topical cream Apply  to affected area as needed for Dry Skin. 2/12/21   Scotty Torres MD   ergocalciferol (ERGOCALCIFEROL) 1,250 mcg (50,000 unit) capsule Take 1 Cap by mouth every seven (7) days. 2/1/21   Scotty Torres MD   oxybutynin (DITROPAN) 5 mg tablet Take 1 Tab by mouth three (3) times daily as needed (urinary frequency). 2/14/20   Scotty Torres MD   cyanocobalamin, vitamin B-12, (VITAMIN B-12) 1,000 mcg/mL drop Take 1-2 Drops by mouth daily.     Provider, Historical     Allergies   Allergen Reactions    Iodine Unknown (comments)     Other reaction(s): anaphylaxis/angioedema  Face throat, swelled after CT in 2001, had MRI w/wo Contrast in 2016 with no problem    Sulfa (Sulfonamide Antibiotics) Swelling    Codeine Hives    Iodinated Contrast Media Itching and Swelling    Pecan Nut Other (comments)     Throat and ears itch    Prednisone Other (comments)    Shellfish Derived Swelling    Sudafed [Pseudoephedrine Hcl] Other (comments)     Has seizure when combined w/ antibotic    Sulfamethoxazole-Trimethoprim Unknown (comments)     Stated had seizure r/t an interaction with seizure medication        Patient Active Problem List    Diagnosis Date Noted    Seizure disorder ([facility] 11/26/2013     Priority: 2 - Two    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    Psychophysiological insomnia 08/08/2020    Panic attacks 08/08/2020    Other constipation 01/19/2020    Endometriosis 05/08/2019    Dysmenorrhea 02/12/2019    Thyroid nodule 02/12/2019    Loose skin 07/27/2016    Allergic reaction 07/27/2016    Abnormal uterine bleeding (AUB) 07/27/2016    Vitamin D deficiency 01/25/2016    Frequent UTI 01/22/2016    CKD (chronic kidney disease) stage 2, GFR 60-89 ml/min 05/13/2015    GARCIA (dyspnea on exertion) 03/03/2014     Past Medical History:   Diagnosis Date    BV (bacterial vaginosis)     Chronic BV    Depression     [facility]     Neftaly Martinez    H/O exercise stress test 11/26/2013    exercise nuc stress test wnl with EF 83%    Hematuria, microscopic 2015    Pelvic floor dysfunction     Recurrent UTI     Seizures (HCC)     Urinary frequency     UTI (urinary tract infection)      Past Surgical History:   Procedure Laterality Date    HX CHOLECYSTECTOMY  2005    HX TUBAL LIGATION  2008    CT LAP,CHOLECYSTECTOMY       Family History   Problem Relation Age of Onset    Thyroid Disease Mother     Alcohol abuse Father     Thyroid Disease Maternal Aunt     Thyroid Disease Maternal Grandmother     Cataract Paternal Grandmother      Social History     Tobacco Use    Smoking status: Never Smoker    Smokeless tobacco: Never Used   Substance Use Topics    Alcohol use: Not Currently       Review of Systems        Objective:   Vital Signs: (As obtained by patient/caregiver at home)  Visit Vitals  Ht 5' 4.5\" (1.638 m)   Wt 180 lb (81.6 kg)   BMI 30.42 kg/m²     Physical Exam  Constitutional:       General: She is awake. She is not in acute distress. Appearance: She is not toxic-appearing or diaphoretic. HENT:      Head: Normocephalic and atraumatic. Nose: Nose normal.   Eyes:      General: No scleral icterus. Extraocular Movements: Extraocular movements intact. Conjunctiva/sclera: Conjunctivae normal.   Neck:      Musculoskeletal: Full passive range of motion without pain. Pulmonary:      Effort: Pulmonary effort is normal. No accessory muscle usage or respiratory distress. Skin:     General: Skin is dry. Neurological:      Mental Status: She is alert. Cranial Nerves: No cranial nerve deficit. Psychiatric:         Attention and Perception: Attention and perception normal.         Mood and Affect: Mood and affect normal.         Speech: Speech normal.         Behavior: Behavior normal. Behavior is cooperative.          Cognition and Memory: Cognition and memory normal.            Aftab Vickers, who was evaluated through a synchronous (real-time) audio-video encounter, and/or her healthcare decision maker, is aware that it is a billable service, with coverage as determined by her insurance carrier. She provided verbal consent to proceed: Yes, and patient identification was verified. This visit was conducted pursuant to the emergency declaration under the [address] [place] [address] and the [employer] General Act. A caregiver was present when appropriate. Ability to conduct physical exam was limited. The patient was located in a state where the provider was credentialed to provide care.      Kade Cassidy MD  Internal Medicine, Family Medicine & Sports Medicine

## 2021-04-16 DIAGNOSIS — N89.8 VAGINAL DISCHARGE: ICD-10-CM

## 2021-04-16 RX ORDER — METRONIDAZOLE 500 MG/1
TABLET ORAL
Qty: 14 TAB | Refills: 0 | OUTPATIENT
Start: 2021-04-16

## 2021-04-16 NOTE — TELEPHONE ENCOUNTER
Last seen VV  4/12/21    Last filled 4/12/21 qty 4 w/ 1 refill (written to take 1 tab po q7 days)    Patient should not need refills. Called patient and left message for her to return call to inform.

## 2021-04-22 DIAGNOSIS — N89.8 VAGINAL DISCHARGE: ICD-10-CM

## 2021-04-22 RX ORDER — METRONIDAZOLE 500 MG/1
TABLET ORAL
Qty: 14 TAB | Refills: 0 | Status: SHIPPED | OUTPATIENT
Start: 2021-04-22 | End: 2021-05-18

## 2021-04-22 NOTE — TELEPHONE ENCOUNTER
Patient sent [employer] message requesting refill for Flagyl. Please see message below: We discussed my symptoms specifically during my last appointment. I know you know Im aware of the difference of my symptoms of my yeast infection and bv. My bv symptoms are never extremely fishy smell but its not pleasant. This is why I submitted the refill. When taking the meds it typically clears up in a few days.

## 2021-05-18 ENCOUNTER — APPOINTMENT (OUTPATIENT)
Dept: FAMILY MEDICINE CLINIC | Age: 41
End: 2021-05-18

## 2021-05-18 ENCOUNTER — VIRTUAL VISIT (OUTPATIENT)
Dept: FAMILY MEDICINE CLINIC | Age: 41
End: 2021-05-18
Payer: COMMERCIAL

## 2021-05-18 DIAGNOSIS — F41.8 ANXIOUS DEPRESSION: Primary | ICD-10-CM

## 2021-05-18 DIAGNOSIS — E55.9 VITAMIN D DEFICIENCY: Chronic | ICD-10-CM

## 2021-05-18 DIAGNOSIS — R79.0 LOW FERRITIN: ICD-10-CM

## 2021-05-18 DIAGNOSIS — R76.8 ANA POSITIVE: ICD-10-CM

## 2021-05-18 DIAGNOSIS — G25.81 RESTLESS LEG SYNDROME: ICD-10-CM

## 2021-05-18 DIAGNOSIS — F51.04 PSYCHOPHYSIOLOGICAL INSOMNIA: ICD-10-CM

## 2021-05-18 DIAGNOSIS — M25.50 POLYARTHRALGIA: ICD-10-CM

## 2021-05-18 DIAGNOSIS — R53.83 FATIGUE, UNSPECIFIED TYPE: ICD-10-CM

## 2021-05-18 DIAGNOSIS — R40.0 DAYTIME SOMNOLENCE: ICD-10-CM

## 2021-05-18 PROCEDURE — 99215 OFFICE O/P EST HI 40 MIN: CPT | Performed by: FAMILY MEDICINE

## 2021-05-18 NOTE — PROGRESS NOTES
Note to patient:  The purpose of this note is to communicate optimally with the other physicians / APCs involved in your care. It is written using standard medical terminology. If you have questions regarding the details of the note, please contact my office to schedule an appointment to address your questions. Ragini Romero  Primary Care Office Visit - Telemedicine Problem-Oriented Note    Assessment & Plan:       ICD-10-CM ICD-9-CM   1. 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 Psychophysiological insomnia  F51.04 307.42   3. Polyarthralgia  M25.50 719.49   4. Vitamin D deficiency  E55.9 268.9   5. Low ferritin  R79.0 790.6   6. Daytime somnolence  R40.0 780.54   7. Fatigue, unspecified type  R53.83 780.79   8. Restless leg syndrome  G25.81 333.94     # polyarthralgia - new issue, significant  DDx includes autoimmune. As she notes she has had this issue intermittently however currently rates as severe, will have her come in for labwork ASAP. Future considerations: referral to rheumatology    # anxious depression - much better than before, stable  > no change to current regimen  Key Psychotherapeutic Meds             sertraline (ZOLOFT) 50 mg tablet (Taking) Take 1 Tab by mouth daily. busPIRone (BUSPAR) 10 mg tablet (Taking) Take 1 Tab by mouth three (3) times daily. May take an additional dose (max of 40mg/day) as needed.           # VitD def  - unclear control  # low ferrtin - unclear control  May be contributing to  # RLS - working diagnosis  > labs    # daytime somnolence  # fatigue  > amenable to sleep med eval        Orders Placed This Encounter    URIC ACID     Standing Status:   Future     Number of Occurrences:   1     Standing Expiration Date:   5/18/2022    SED RATE (ESR)     Standing Status:   Future     Number of Occurrences:   1     Standing Expiration Date:   5/18/2022    CASPER COMPREHENSIVE PLUS PANEL     Standing Status:   Future     Number of Occurrences:   1     Standing Expiration Date:   9/88/1318    CYCLIC CITRUL PEPTIDE AB, IGG     Standing Status:   Future     Number of Occurrences:   1     Standing Expiration Date:   5/18/2022    C REACTIVE PROTEIN, QT     Standing Status:   Future     Number of Occurrences:   1     Standing Expiration Date:   5/18/2022    FERRITIN     Standing Status:   Future     Number of Occurrences:   1     Standing Expiration Date:   5/18/2022    VITAMIN B12 & FOLATE     Standing Status:   Future     Number of Occurrences:   1     Standing Expiration Date:   5/18/2022    VITAMIN D, 25 HYDROXY     Standing Status:   Future     Number of Occurrences:   1     Standing Expiration Date:   1/18/9880    METABOLIC PANEL, COMPREHENSIVE     Standing Status:   Future     Number of Occurrences:   1     Standing Expiration Date:   5/18/2022   [facility] SLEEP MEDICINE REFERRAL     Referral Priority:   Routine     Referral Type:   Consultation     Referral Reason:   Specialty Services Required     Referred to Provider:   Christina Gilmore MD     Number of Visits Requested:   1       On this date 5/18/2021, I have spent 42 minutes providing care to this patient, which included reviewing the EMR to see if there were any recent visits to the ED, specialists, prior lab or radiology results, obtaining the history from the patient, examining the patient, providing discharge education regarding the diagnosis and counseling on appropriate follow-up, as well as documenting this visit in the EMR. 712  Subjective:   Tiffanie Richardson is a 41 y.o. female who was seen for Follow-up (medication eval)    \"My spirits are doing well, but I'm having other issues\". Notes mood and mental health relatively good. Compliant with Rx. # Diffuse joint pain, specifically B hand pain, B shoulder pain and B knees. \"I thought it was because of my low VitD, but I have been really good about taking that\". Notes difficulty with  strength.   Is intolerant of the PO iron as it makes her nauseated. Has the parafon forte (given by neuro) - which makes her sleepy. # significant daytime somnolence  Bedtime = MN  Up naturally at 4-5am  \"I could readily take naps all day everyday\"    No significant known Family hx of autoimmune diseases / disorders      HOLLY 2/7 5/18/2021 3/23/2021 2/12/2021   Feeling nervous, anxious or on edge? 0 2 0   Not being able to stop or control worrying? 0 3 2   HOLLY-2 Subtotal 0 5 2   Worrying too much about different things? 0 3 3   Trouble relaxing? 2 2 0   Being so restless that it is hard to sit still? 0 0 1   Becoming easily annoyed or irritable? 0 2 2   Feeling afraid as if something awful might happen? 0 1 0   HOLLY-7 Total Score 2 13 8   If you checked off any problems, how difficult have these problems made it for you to do your work, take care of thinks at home, or get along with other people? Somewhat difficult - Very difficult       3 most recent PHQ Screens 5/18/2021   Little interest or pleasure in doing things Several days   Feeling down, depressed, irritable, or hopeless Not at all   Total Score PHQ 2 1   Trouble falling or staying asleep, or sleeping too much Nearly every day   Feeling tired or having little energy Nearly every day   Poor appetite, weight loss, or overeating Several days   Feeling bad about yourself - or that you are a failure or have let yourself or your family down Not at all   Trouble concentrating on things such as school, work, reading, or watching TV Several days   Moving or speaking so slowly that other people could have noticed; or the opposite being so fidgety that others notice Not at all   Thoughts of being better off dead, or hurting yourself in some way Not at all   PHQ 9 Score 9   How difficult have these problems made it for you to do your work, take care of your home and get along with others Very difficult           Prior to Admission medications    Medication Sig Start Date End Date Taking?  Authorizing Provider   sertraline (ZOLOFT) 50 mg tablet Take 1 Tab by mouth daily. 4/12/21  Yes Huma Mulligan MD   busPIRone (BUSPAR) 10 mg tablet Take 1 Tab by mouth three (3) times daily. May take an additional dose (max of 40mg/day) as needed. 3/23/21  Yes Huma Mulligan MD   glycerin-mineral oil-lanolin (EUCERIN PLUS) topical cream Apply  to affected area as needed for Dry Skin. 2/12/21  Yes Huma Mulligan MD   ergocalciferol (ERGOCALCIFEROL) 1,250 mcg (50,000 unit) capsule Take 1 Cap by mouth every seven (7) days. 2/1/21  Yes Huma Mulligan MD   fluticasone propionate (FLONASE) 50 mcg/actuation nasal spray SHAKE LIQUID AND USE 2 SPRAYS IN EACH NOSTRIL DAILY 5/4/20  Yes Symone Torres MD   topiramate (TOPAMAX) 200 mg tablet Take 200 mg by mouth two (2) times a day. 12/10/19  Yes Provider, Historical   cholecalciferol, vitamin D3, (VITAMIN D3 PO) Take 1 Tab by mouth daily. Yes Provider, Historical   cyanocobalamin, vitamin B-12, (VITAMIN B-12) 1,000 mcg/mL drop Take 1-2 Drops by mouth daily. Yes Provider, Historical   levonorgestrel (MIRENA) 20 mcg/24 hours (5 yrs) 52 mg IUD Mirena 20 mcg/24 hours (5 yrs) 52 mg intrauterine device   Take 1 device by intrauterine route.    Yes Provider, Historical     Allergies   Allergen Reactions    Iodine Unknown (comments)     Other reaction(s): anaphylaxis/angioedema  Face throat, swelled after CT in 2001, had MRI w/wo Contrast in 2016 with no problem    Sulfa (Sulfonamide Antibiotics) Swelling    Codeine Hives    Iodinated Contrast Media Itching and Swelling    Pecan Nut Other (comments)     Throat and ears itch    Prednisone Other (comments)    Shellfish Derived Swelling    Sudafed [Pseudoephedrine Hcl] Other (comments)     Has seizure when combined w/ antibotic    Sulfamethoxazole-Trimethoprim Unknown (comments)     Stated had seizure r/t an interaction with seizure medication        Patient Active Problem List    Diagnosis Date Noted    Seiz[facility] ([facility] 11/26/2013  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    Psychophysiological insomnia 08/08/2020    Panic attacks 08/08/2020    Other constipation 01/19/2020    Endometriosis 05/08/2019    Dysmenorrhea 02/12/2019    Thyroid nodule 02/12/2019    Loose skin 07/27/2016    Allergic reaction 07/27/2016    Abnormal uterine bleeding (AUB) 07/27/2016    Vitamin D deficiency 01/25/2016    Frequent UTI 01/22/2016    CKD (chronic kidney disease) stage 2, GFR 60-89 ml/min 05/13/2015    GARCIA (dyspnea on exertion) 03/03/2014     Past Medical History:   Diagnosis Date    BV (bacterial vaginosis)     Chronic BV    Depression     Epilepsy ([address] 2001    Dr. [place] H/O exercise stress test 11/26/2013    exercise nuc stress test wnl with EF 83%    Hematuria, microscopic 2015    Pelvic floor dysfunction     Recurrent UTI     Seizures (HCC)     Urinary frequency     UTI (urinary tract infection)      Past Surgical History:   Procedure Laterality Date    HX CHOLECYSTECTOMY  2005    HX TUBAL LIGATION  2008    FL LAP,CHOLECYSTECTOMY       Family History   Problem Relation Age of Onset    Thyroid Disease Mother     Alcohol abuse Father     Thyroid Disease Maternal Aunt     Thyroid Disease Maternal Grandmother     Cancer Maternal Grandmother         \"bone cancer\"    Cataract Paternal Grandmother      Social History     Tobacco Use    Smoking status: Never Smoker    Smokeless tobacco: Never Used   Substance Use Topics    Alcohol use: Not Currently       Review of Systems        Objective:   Vital Signs: (As obtained by patient/caregiver at home)  There were no vitals taken for this visit. Physical Exam  Constitutional:       General: She is awake. She is not in acute distress. Appearance: She is not toxic-appearing or diaphoretic. HENT:      Head: Normocephalic and atraumatic. Nose: Nose normal.   Eyes:      General: No scleral icterus. Extraocular Movements: Extraocular movements intact. Conjunctiva/sclera: Conjunctivae normal.   Pulmonary:      Effort: Pulmonary effort is normal. No accessory muscle usage or respiratory distress. Musculoskeletal:      Cervical back: Full passive range of motion without pain. Skin:     General: Skin is dry. Neurological:      Mental Status: She is alert. Cranial Nerves: No cranial nerve deficit. Psychiatric:         Attention and Perception: Attention and perception normal.         Mood and Affect: Mood and affect normal.         Speech: Speech normal.         Behavior: Behavior normal. Behavior is cooperative. Cognition and Memory: Cognition and memory normal.              Doug Handy, who was evaluated through a synchronous (real-time) audio-video encounter, and/or her healthcare decision maker, is aware that it is a billable service, with coverage as determined by her insurance carrier. She provided verbal consent to proceed: Yes, and patient identification was verified. This visit was conducted pursuant to the emergency declaration under the [address] [place] [address], [address] [place] [address] authority and the [employer] and [employer] General Act. A caregiver was present when appropriate. Ability to conduct physical exam was limited. The patient was located in a state where the provider was credentialed to provide care. Benja Flor MD  Internal Medicine, Family Medicine & Sports Medicine    Addendum (6/2/2021):     Reviewed results. Pt prefers rheumatology referral over watchful waiting / observation at this time. · Normal uric acid (no gout).   · Low ferritin of 14   · Low normal B12 of 485   · VitD is adequate at 31   · ESR and CRP  were normal   · CASPER titer (looking for autoantibodies) was erroneously run twice on same specimen, results of 1:40 and 1:80 cytoplasmic pattern     Orders Placed This Encounter    REFERRAL TO RHEUMATOLOGY     Referral Priority:   Routine     Referral Type: Consultation     Referral Reason:   Specialty Services Required     Referred to Provider:   Natalia Mcleod MD     Number of Visits Requested:   1           Jaycee Conti MD  Internal Medicine, Family Medicine & Sports Medicine  6/2/2021 1:55 PM

## 2021-05-18 NOTE — PROGRESS NOTES
Abhinav Lew presents today for   Chief Complaint   Patient presents with    Follow-up     medication eval       Virtual/telephone visit    Depression Screening:  3 most recent PHQ Screens 5/18/2021   Little interest or pleasure in doing things Several days   Feeling down, depressed, irritable, or hopeless Not at all   Total Score PHQ 2 1   Trouble falling or staying asleep, or sleeping too much Nearly every day   Feeling tired or having little energy Nearly every day   Poor appetite, weight loss, or overeating Several days   Feeling bad about yourself - or that you are a failure or have let yourself or your family down Not at all   Trouble concentrating on things such as school, work, reading, or watching TV Several days   Moving or speaking so slowly that other people could have noticed; or the opposite being so fidgety that others notice Not at all   Thoughts of being better off dead, or hurting yourself in some way Not at all   PHQ 9 Score 9   How difficult have these problems made it for you to do your work, take care of your home and get along with others Very difficult       Learning Assessment:  Learning Assessment 1/22/2016   PRIMARY LEARNER Patient   HIGHEST LEVEL OF EDUCATION - PRIMARY LEARNER  4 YEARS OF COLLEGE   BARRIERS PRIMARY LEARNER NONE   CO-LEARNER CAREGIVER No   PRIMARY LANGUAGE ENGLISH   LEARNER PREFERENCE PRIMARY VIDEOS     LISTENING   ANSWERED BY self   RELATIONSHIP SELF       Travel Screening:    Travel Screening     Question   Response    In the last month, have you been in contact with someone who was confirmed or suspected to have Coronavirus / COVID-19? No / Unsure    Have you had a COVID-19 viral test in the last 14 days? No    Do you have any of the following new or worsening symptoms? None of these    Have you traveled internationally or domestically in the last month?   No      Travel History   Travel since 04/18/21     No documented travel since 04/18/21          Health Maintenance reviewed and discussed and ordered per Provider. Health Maintenance Due   Topic Date Due    COVID-19 Vaccine (1) Never done   . Coordination of Care:  1. Have you been to the ER, urgent care clinic since your last visit? Hospitalized since your last visit? No.    2. Have you seen or consulted any other health care providers outside of the [address] [place] [address] since your last visit? Include any pap smears or colon screening.  No.

## 2021-06-01 ENCOUNTER — TELEPHONE (OUTPATIENT)
Dept: FAMILY MEDICINE CLINIC | Age: 41
End: 2021-06-01

## 2021-06-01 NOTE — TELEPHONE ENCOUNTER
Patient is calling for her lab results. She is a little frustrated because she has not heard anything from us and she has sent several messages about it because she is still having pain and is wanting to know what is going on with her health. I see where her labs were drawn on 5/18/21 however they have not crossed over to us. Could you please call the lab to see if results are ready but just hasn't crossed over yet and give the patient an update since she has not heard anything.

## 2021-06-01 NOTE — TELEPHONE ENCOUNTER
I sent the following extensive MyChart msg back to [address] [place] [address]:    I apologize for the delay. .. it seems as though [facility] actually ran your labs twice. .. so we have been receiving multiple faxed results for the same labs. This is also probably affecting the ability of the information to interface with our system. I want to make sure you are aware of this, as certainly I did not order the labs twice for the same specimen. .. in case you are billed twice. I have included the specimen and requisition info below, in case you want to follow-up with [facility].    · Normal uric acid (no gout). · Electrolytes flagged as abnormal, however barely \"out of range\", thus not significant. · Low ferritin of 14 -> needs to be working on increasing iron intake. .. and this can definitely worsen restless leg syndrome!!!  · Low normal B12 of 485 -> continue to work on increasing VitB12 intake. · VitD is adequate at 31 -> keep going with weekly prescription VItD indefinitely. · ESR and CRP (nonspecific markers for inflammation) were normal -> no significant inflammation found. · CASPER titer (looking for autoantibodies) was normal (1:40) in one result, and just barely abnormal (1:80) in another result, with a pattern of \"cytoplasmic\" -> this is still NOT specific for any particular rheumatologic / autoimmune illness, but may warrant a referral to rheumatology (with the understanding that there is likely quite a wait to get in with a rheumatologist) if her symptoms are ongoing and she'd like to pursue further workup. So I don't have any specific answers for you, unfortunately. I do have recommendations though!  - You really need to continue to work on your iron, B12 and Vitamin D  - We certainly can refer you to rheumatology for their expertise (anything further is outside of my scope), or we can do \"watchful waiting\" to see if this simply self-resolves with time.    - keep your sleep medicine evaluation  - if you have any significant rashes / skin changes, it would be ideal for you to see a dermatologist and get it biopsied, given your slightly abnormal CASPER results    Let me know if you'd like the rheumatology referral.    ~ Dr. Zeyad Hoover (same for both): [id number]  Requisition: [id number] and [id number]

## 2021-06-11 DIAGNOSIS — H69.83 EUSTACHIAN TUBE DYSFUNCTION, BILATERAL: ICD-10-CM

## 2021-06-11 DIAGNOSIS — E55.9 VITAMIN D DEFICIENCY: ICD-10-CM

## 2021-06-11 RX ORDER — ERGOCALCIFEROL 1.25 MG/1
50000 CAPSULE ORAL
Qty: 12 CAPSULE | Refills: 1 | Status: SHIPPED | OUTPATIENT
Start: 2021-06-11 | End: 2022-03-04 | Stop reason: SDUPTHER

## 2021-06-11 RX ORDER — FLUTICASONE PROPIONATE 50 MCG
2 SPRAY, SUSPENSION (ML) NASAL DAILY
Qty: 16 G | Refills: 5 | Status: SHIPPED | OUTPATIENT
Start: 2021-06-11 | End: 2021-06-29

## 2021-06-16 NOTE — TELEPHONE ENCOUNTER
Spoke with patient. Per patient she requested last OV to go to the [facility] at [address] [facility]. Advised that I will call to have it transferred and also inquire which medication the insurance will cover in place of cipro.       Per Pharmacist she recommends either Cortisporin or Oflaxacin in place of [address] (1) Oriented to own ability

## 2021-06-29 ENCOUNTER — OFFICE VISIT (OUTPATIENT)
Dept: FAMILY MEDICINE CLINIC | Age: 41
End: 2021-06-29
Payer: COMMERCIAL

## 2021-06-29 ENCOUNTER — TELEPHONE (OUTPATIENT)
Dept: FAMILY MEDICINE CLINIC | Age: 41
End: 2021-06-29

## 2021-06-29 VITALS
DIASTOLIC BLOOD PRESSURE: 70 MMHG | RESPIRATION RATE: 16 BRPM | HEIGHT: 65 IN | BODY MASS INDEX: 32.29 KG/M2 | OXYGEN SATURATION: 98 % | WEIGHT: 193.8 LBS | SYSTOLIC BLOOD PRESSURE: 107 MMHG | HEART RATE: 80 BPM | TEMPERATURE: 97.4 F

## 2021-06-29 DIAGNOSIS — R41.840 ATTENTION AND CONCENTRATION DEFICIT: ICD-10-CM

## 2021-06-29 DIAGNOSIS — F41.8 ANXIOUS DEPRESSION: Primary | ICD-10-CM

## 2021-06-29 PROCEDURE — 99213 OFFICE O/P EST LOW 20 MIN: CPT | Performed by: FAMILY MEDICINE

## 2021-06-29 RX ORDER — CHLORZOXAZONE 500 MG/1
500 TABLET ORAL
COMMUNITY
End: 2022-03-04

## 2021-06-29 NOTE — PROGRESS NOTES
Dannielle Lezama is a 41 y.o. female (: 1980) presenting to address:    Chief Complaint   Patient presents with    Concern     unable to focus        Vitals:    21 0806   BP: 107/70   Pulse: 80   Resp: 16   Temp: 97.4 °F (36.3 °C)   TempSrc: Temporal   SpO2: 98%   Weight: 193 lb 12.8 oz (87.9 kg)   Height: 5' 4.5\" (1.638 m)   PainSc:   7   PainLoc: Wrist       Hearing/Vision:   No exam data present    Learning Assessment:     Learning Assessment 2016   PRIMARY LEARNER Patient   HIGHEST LEVEL OF EDUCATION - PRIMARY LEARNER  4 YEARS OF COLLEGE   BARRIERS PRIMARY LEARNER NONE   CO-LEARNER CAREGIVER No   PRIMARY LANGUAGE ENGLISH   LEARNER PREFERENCE PRIMARY VIDEOS     LISTENING   ANSWERED BY self   RELATIONSHIP SELF     Depression Screening:     3 most recent PHQ Screens 2021   Little interest or pleasure in doing things Several days   Feeling down, depressed, irritable, or hopeless Several days   Total Score PHQ 2 2   Trouble falling or staying asleep, or sleeping too much Nearly every day   Feeling tired or having little energy Nearly every day   Poor appetite, weight loss, or overeating Nearly every day   Feeling bad about yourself - or that you are a failure or have let yourself or your family down Not at all   Trouble concentrating on things such as school, work, reading, or watching TV Nearly every day   Moving or speaking so slowly that other people could have noticed; or the opposite being so fidgety that others notice Nearly every day   Thoughts of being better off dead, or hurting yourself in some way Not at all   PHQ 9 Score 17   How difficult have these problems made it for you to do your work, take care of your home and get along with others Very difficult     Fall Risk Assessment:     Fall Risk Assessment, last 12 mths 2021   Able to walk? Yes   Fall in past 12 months? 0   Do you feel unsteady?  0   Are you worried about falling 0     Abuse Screening:     Abuse Screening Questionnaire 6/29/2021   Do you ever feel afraid of your partner? N   Are you in a relationship with someone who physically or mentally threatens you? N   Is it safe for you to go home? Y     ADL Assessment:     ADL Assessment 1/22/2018   Feeding yourself No Help Needed   Getting from bed to chair No Help Needed   Getting dressed No Help Needed   Bathing or showering No Help Needed   Walk across the room (includes cane/walker) No Help Needed   Using the telphone No Help Needed   Taking your medications No Help Needed   Preparing meals No Help Needed   Managing money (expenses/bills) No Help Needed   Moderately strenuous housework (laundry) No Help Needed   Shopping for personal items (toiletries/medicines) No Help Needed   Shopping for groceries No Help Needed   Driving No Help Needed   Climbing a flight of stairs No Help Needed   Getting to places beyond walking distances No Help Needed        Coordination of Care Questionaire:   1. Have you been to the ER, urgent care clinic since your last visit? Hospitalized since your last visit? NO    2. Have you seen or consulted any other health care providers outside of the [address] [place] [address] [place] since your last visit? Include any pap smears or colon screening. [address] [facility]  Neurology & Sleep     Advanced Directive:   1. Do you have an Advanced Directive? NO    2. Would you like information on Advanced Directives?  NO

## 2021-06-29 NOTE — PROGRESS NOTES
HISTORY OF PRESENT ILLNESS  Alejandro Freeman is a 41 y.o. female. Ms. Amna Elizabeth reports that her work is overwhelming and she is having trouble concentrating and is making mistakes. She notes that she took some time off of work on [employer] previously and her PCP advised her that she should be seriously considering alternative employment. She reports that its difficult to look for a new job and she is not sure how to go about it. She indicates that she previously was in counseling but has never seen a psychiatry provider for medication management. She is currently taking 3 different antidepressant/anxiolytic medications. She reports that she has had a recent sleep evaluation and advised that she has \"parasomnia\". She is also disturbed about having recently gained weight.     Mr#: [id number]      Past Medical History:   Diagnosis Date    BV (bacterial vaginosis)     Chronic BV    Depression     Epilepsy ([facility] [address] 2001    Dr. Jaylin Locke H/O exercise stress test 11/26/2013    exercise nuc stress test wnl with EF 83%    Hematuria, microscopic 2015    Pelvic floor dysfunction     Recurrent UTI     Seizures (HCC)     Urinary frequency     UTI (urinary tract infection)        Past Surgical History:   Procedure Laterality Date    HX CHOLECYSTECTOMY  2005    HX TUBAL LIGATION  2008    MN LAP,CHOLECYSTECTOMY         Family History   Problem Relation Age of Onset    Thyroid Disease Mother     Alcohol abuse Father     Thyroid Disease Maternal Aunt     Thyroid Disease Maternal Grandmother     Cancer Maternal Grandmother         \"bone cancer\"    Cataract Paternal Grandmother        Allergies   Allergen Reactions    Iodine Unknown (comments)     Other reaction(s): anaphylaxis/angioedema  Face throat, swelled after CT in 2001, had MRI w/wo Contrast in 2016 with no problem    Sulfa (Sulfonamide Antibiotics) Swelling    Codeine Hives    Iodinated Contrast Media Itching and Swelling    Pecan Nut Other (comments)     Throat and ears itch    Prednisone Other (comments)    Shellfish Derived Swelling    Sudafed [Pseudoephedrine Hcl] Other (comments)     Has seizure when combined w/ antibotic    Sulfamethoxazole-Trimethoprim Unknown (comments)     Stated had seizure r/t an interaction with seizure medication        Social History     Tobacco Use   Smoking Status Never Smoker   Smokeless Tobacco Never Used       Social History     Substance and Sexual Activity   Alcohol Use Not Currently         Patient Active Problem List   Diagnosis Code    [facility] ([facility] G40.909    GARCIA (dyspnea on exertion) R06.00    CKD (chronic kidney disease) stage 2, GFR 60-89 ml/min N18.2    Frequent UTI N39.0    Vitamin D deficiency E55.9    Loose skin L98.7    Allergic reaction T78.40XA    Abnormal uterine bleeding (AUB) N93.9    Dysmenorrhea N94.6    Endometriosis N80.9    Thyroid nodule E04.1    Other constipation K59.09    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 , Disp: , Rfl:     ergocalciferol (ERGOCALCIFEROL) 1,250 mcg (50,000 unit) capsule, Take 1 Capsule by mouth every seven (7) days. , Disp: 12 Capsule, Rfl: 1    sertraline (ZOLOFT) 50 mg tablet, Take 1 Tab by mouth daily. , Disp: 90 Tab, Rfl: 3    busPIRone (BUSPAR) 10 mg tablet, Take 1 Tab by mouth three (3) times daily. May take an additional dose (max of 40mg/day) as needed. , Disp: 90 Tab, Rfl: 1    topiramate (TOPAMAX) 200 mg tablet, Take 200 mg by mouth two (2) times a day., Disp: , Rfl:     cyanocobalamin, vitamin B-12, (VITAMIN B-12) 1,000 mcg/mL drop, Take 1-2 Drops by mouth daily. , Disp: , Rfl:     levonorgestrel (MIRENA) 20 mcg/24 hours (5 yrs) 52 mg IUD, Mirena 20 mcg/24 hours (5 yrs) 52 mg intrauterine device  Take 1 device by intrauterine route., Disp: , Rfl:        Review of Systems   Constitutional: Negative for fever and weight loss. Psychiatric/Behavioral: Positive for depression. Negative for suicidal ideas. The patient is nervous/anxious. Difficulty focusing and concentrating, see HPI     Visit Vitals  /70 (BP 1 Location: Right arm, BP Patient Position: Sitting, BP Cuff Size: Large adult)   Pulse 80   Temp 97.4 °F (36.3 °C) (Temporal)   Resp 16   Ht 5' 4.5\" (1.638 m)   Wt 193 lb 12.8 oz (87.9 kg)   SpO2 98%   BMI 32.75 kg/m²       Physical Exam  Vitals and nursing note reviewed. Constitutional:       Appearance: Normal appearance. She is well-developed. She is obese. HENT:      Head: Normocephalic. Cardiovascular:      Rate and Rhythm: Normal rate. Pulmonary:      Effort: Pulmonary effort is normal.   Musculoskeletal:      Cervical back: Neck supple. Skin:     General: Skin is warm and dry. Neurological:      Mental Status: She is alert and oriented to person, place, and time. Psychiatric:         Behavior: Behavior normal.         ASSESSMENT and PLAN    ICD-10-CM ICD-9-CM    1. Anxious depression  F41.8 300.4 REFERRAL TO PSYCHIATRY   2.  Attention and concentration deficit  R41.840 799.51 REFERRAL TO PSYCHIATRY   Assessment:  History of depression with anxiety now reporting inability to tolerate her work and feeling overwhelmed with associated difficulty concentrating    Plan:  Advised that medication adjustment in view of her current multimedication regimen and the situational component of her issue is difficult  Options discussed at length including referral for neurocognitive evaluation and referral for psychiatry evaluation, possibility of FMLA again discussed although it is not clear if she would need guidelines for that or  whether she would be eligible for disability  While I was out of the exam room attempting to look into referral for psychiatry evaluation after discussing this with the patient and her voicing agreement, she left the office stating that she was not happy and that she should not have to make decisions about treatment choices    Sher Gaona MD      PLEASE NOTE:   This document has been produced using voice recognition software. Unrecognized errors in transcription may be present.

## 2021-06-29 NOTE — PATIENT INSTRUCTIONS
Referral for psychiatry evaluation and management  Recommend continuing to look for alternative employment  Report new or worsening symptoms

## 2021-06-29 NOTE — TELEPHONE ENCOUNTER
Patient was seen this morning @ 8 with Dr. Lennie Stockton. Patient left upset requesting to speak to the practice manager. I called patient @11:50 to discuss her visit. She stated that she does not want her insurance to be charged for her visit today as she was only in there for a few minutes. That she had never experienced a visit where a doctor asked her what she wanted to do. She feels that she came in for help and wanted him to tell her what to do and he didn't do that. She was very disappointed with the visit. I then apologized to her and informed her that I had spoken to [address] and Dulce Rodriguez who was her nurse and just as Dulce Rodriguez stated to her due to they had both done a work up on her and spent a good portion of time with her she Alyssa Mahoney) could not guarantee that the visit wouldn't be billed. I informed the patient that the visit was being billed as [facility] did see her and even placed a referral for her. Ms. Fani Ch then stated that she would be filling a grievance with her insurance company as she does not feel it was a visit. I told her that she has that right to do so. I then offered to see if Dr. Miguelito Smith would take over her care but I would need to speak to her first as each doctor practices medicine differently and would need to make sure Dr. Miguelito Smith feels comfortable with the medication she is currently taking. The patient then stated that me calling her was not to resolve anything because the physician is always right. I again apologized to the patient that she feels that way but we performed a service and tried to help her. She just didn't like the way we were trying to help her. I asked her again if she would like me to speak to Dr. Miguelito Smith and she stated no that she needed to think about what she wanted to do next. I also informed the patient that I would document our conversation in her chart.

## 2021-07-08 DIAGNOSIS — F41.8 ANXIOUS DEPRESSION: ICD-10-CM

## 2021-07-08 RX ORDER — SERTRALINE HYDROCHLORIDE 50 MG/1
TABLET, FILM COATED ORAL
Qty: 90 TABLET | Refills: 1 | Status: SHIPPED | OUTPATIENT
Start: 2021-07-08 | End: 2021-12-28

## 2021-08-12 LAB — MAMMOGRAPHY, EXTERNAL: NORMAL

## 2021-10-08 NOTE — PATIENT INSTRUCTIONS
Urinary Tract Infection in Women: Care Instructions  Your Care Instructions    A urinary tract infection, or UTI, is a general term for an infection anywhere between the kidneys and the urethra (where urine comes out). Most UTIs are bladder infections. They often cause pain or burning when you urinate. UTIs are caused by bacteria and can be cured with antibiotics. Be sure to complete your treatment so that the infection goes away. Follow-up care is a key part of your treatment and safety. Be sure to make and go to all appointments, and call your doctor if you are having problems. It's also a good idea to know your test results and keep a list of the medicines you take. How can you care for yourself at home? · Take your antibiotics as directed. Do not stop taking them just because you feel better. You need to take the full course of antibiotics. · Drink extra water and other fluids for the next day or two. This may help wash out the bacteria that are causing the infection. (If you have kidney, heart, or liver disease and have to limit fluids, talk with your doctor before you increase your fluid intake.)  · Avoid drinks that are carbonated or have caffeine. They can irritate the bladder. · Urinate often. Try to empty your bladder each time. · To relieve pain, take a hot bath or lay a heating pad set on low over your lower belly or genital area. Never go to sleep with a heating pad in place. To prevent UTIs  · Drink plenty of water each day. This helps you urinate often, which clears bacteria from your system. (If you have kidney, heart, or liver disease and have to limit fluids, talk with your doctor before you increase your fluid intake.)  · Urinate when you need to. · Urinate right after you have sex. · Change sanitary pads often. · Avoid douches, bubble baths, feminine hygiene sprays, and other feminine hygiene products that have deodorants.   · After going to the bathroom, wipe from front to back.  When should you call for help? Call your doctor now or seek immediate medical care if:  ? · Symptoms such as fever, chills, nausea, or vomiting get worse or appear for the first time. ? · You have new pain in your back just below your rib cage. This is called flank pain. ? · There is new blood or pus in your urine. ? · You have any problems with your antibiotic medicine. ? Watch closely for changes in your health, and be sure to contact your doctor if:  ? · You are not getting better after taking an antibiotic for 2 days. ? · Your symptoms go away but then come back. Where can you learn more? Go to http://salome-teresa.info/. Enter K723 in the search box to learn more about \"Urinary Tract Infection in Women: Care Instructions. \"  Current as of: May 12, 2017  Content Version: 11.4  © 0446-4918 [employer]. Care instructions adapted under license by [employer] (which disclaims liability or warranty for this information). If you have questions about a medical condition or this instruction, always ask your healthcare professional. [address] any warranty or liability for your use of this information. PRINCIPAL DISCHARGE DIAGNOSIS  Diagnosis: DKA, type 1  Assessment and Plan of Treatment: DISCHARGE INSTRUCTIONS:  Call or have someone call your local emergency number (911 in the [place] for any of the following:   •Your child has a seizure or becomes unconscious.   •Your child begins to breathe fast, or is short of breath.   •Your child becomes weak and confused.   Seek care immediately if:   •Your child is more drowsy than usual.   Contact your child's healthcare provider if:   •Your child has fruity, sweet breath.   •Your child has severe, new stomach pain, or is vomiting.   •Your child's blood sugar level is lower or higher than you were told it should be.   •Your child has a fever or chills.   •Your child has ketones in the blood or urine.   •Your child is more thirsty than usual.   •Your child is urinating more often than he or she usually does.   •You have questions or concerns about your child's condition or care.          PRINCIPAL DISCHARGE DIAGNOSIS  Diagnosis: DKA, type 1  Assessment and Plan of Treatment: Continue to administer insulin according to your parameters (target 120, SF 25, and I:C 4) using following equation ([BG-TG]/SF) + (Carbs/I:C)  DISCHARGE INSTRUCTIONS:  Call or have someone call your local emergency number (911 in the [place] for any of the following:   •Your child has a seizure or becomes unconscious.   •Your child begins to breathe fast, or is short of breath.   •Your child becomes weak and confused.   Seek care immediately if:   •Your child is more drowsy than usual.   Contact your child's healthcare provider if:   •Your child has fruity, sweet breath.   •Your child has severe, new stomach pain, or is vomiting.   •Your child's blood sugar level is lower or higher than you were told it should be.   •Your child has a fever or chills.   •Your child has ketones in the blood or urine.   •Your child is more thirsty than usual.   •Your child is urinating more often than he or she usually does.   •You have questions or concerns about your child's condition or care.          PRINCIPAL DISCHARGE DIAGNOSIS  Diagnosis: DKA, type 1  Assessment and Plan of Treatment: Continue to administer insulin according to your parameters (target 120, SF 25, and I:C 4) using following equation ([BG-TG]/SF) + (Carbs/I:C)  DISCHARGE INSTRUCTIONS:  Call or have someone call your local emergency number (911 in the [place] for any of the following:   •Your child has a seizure or becomes unconscious.   •Your child begins to breathe fast, or is short of breath.   •Your child becomes weak and confused.   Seek care immediately if:   •Your child is more drowsy than usual.   Contact your child's healthcare provider if:   •Your child has fruity, sweet breath.   •Your child has severe, new stomach pain, or is vomiting.   •Your child's blood sugar level is lower or higher than you were told it should be.   •Your child has a fever or chills.   •Your child has ketones in the blood or urine.   •Your child is more thirsty than usual.   •Your child is urinating more often than he or she usually does.   •You have questions or concerns about your child's condition or care.         SECONDARY DISCHARGE DIAGNOSES  Diagnosis: Ventricular dysfunction  Assessment and Plan of Treatment: Follow up with pediatric cardiology Dr. Oliveros in 1-2 weeks.  Please call number provided to make an appointment.  Please seek medical attention if you experience shortness of breath or chest pain.

## 2021-10-09 DIAGNOSIS — N89.8 VAGINAL IRRITATION: ICD-10-CM

## 2021-10-11 RX ORDER — FLUCONAZOLE 150 MG/1
TABLET ORAL
Qty: 3 TABLET | Refills: 1 | Status: SHIPPED | OUTPATIENT
Start: 2021-10-11 | End: 2022-03-04

## 2021-12-01 DIAGNOSIS — N89.8 VAGINAL DISCHARGE: ICD-10-CM

## 2021-12-03 RX ORDER — METRONIDAZOLE 500 MG/1
TABLET ORAL
Qty: 14 TABLET | Refills: 0 | OUTPATIENT
Start: 2021-12-03

## 2021-12-28 DIAGNOSIS — F41.8 ANXIOUS DEPRESSION: ICD-10-CM

## 2021-12-29 RX ORDER — SERTRALINE HYDROCHLORIDE 50 MG/1
TABLET, FILM COATED ORAL
Qty: 90 TABLET | Refills: 1 | Status: SHIPPED | OUTPATIENT
Start: 2021-12-29 | End: 2022-03-04

## 2022-01-11 ENCOUNTER — VIRTUAL VISIT (OUTPATIENT)
Dept: FAMILY MEDICINE CLINIC | Age: 42
End: 2022-01-11
Payer: COMMERCIAL

## 2022-01-11 DIAGNOSIS — R05.9 COUGH: Primary | ICD-10-CM

## 2022-01-11 DIAGNOSIS — U07.1 COVID-19: ICD-10-CM

## 2022-01-11 PROCEDURE — 99213 OFFICE O/P EST LOW 20 MIN: CPT | Performed by: INTERNAL MEDICINE

## 2022-01-11 RX ORDER — ALBUTEROL SULFATE 90 UG/1
2 AEROSOL, METERED RESPIRATORY (INHALATION)
COMMUNITY

## 2022-01-11 RX ORDER — DOXYCYCLINE 100 MG/1
100 CAPSULE ORAL 2 TIMES DAILY
Qty: 14 CAPSULE | Refills: 0 | Status: SHIPPED | OUTPATIENT
Start: 2022-01-11 | End: 2022-03-04

## 2022-01-11 RX ORDER — BENZONATATE 200 MG/1
200 CAPSULE ORAL
Qty: 20 CAPSULE | Refills: 0 | Status: SHIPPED | OUTPATIENT
Start: 2022-01-11 | End: 2022-03-04

## 2022-01-11 RX ORDER — FLUTICASONE PROPIONATE 50 MCG
2 SPRAY, SUSPENSION (ML) NASAL DAILY
Qty: 1 EACH | Refills: 0 | Status: SHIPPED | OUTPATIENT
Start: 2022-01-11 | End: 2022-03-28

## 2022-01-11 NOTE — PROGRESS NOTES
Ginger Gonzalez is a 35 y.o. female who was seen by synchronous (real-time) audio-video technology on 1/11/2022 for Positive For Covid-19        Assessment & Plan:   Diagnoses and all orders for this visit:    1. Cough, 2/2 #2, ? Superimposed infection since her symptoms started 11 days ago. -     doxycycline (MONODOX) 100 mg capsule; Take 1 Capsule by mouth two (2) times a day. -     benzonatate (TESSALON) 200 mg capsule; Take 1 Capsule by mouth three (3) times daily as needed for Cough. -     fluticasone propionate (FLONASE) 50 mcg/actuation nasal spray; 2 Sprays by Both Nostrils route daily. 2. COVID-19, repeat test is pending    Advised pt to go to  if not better in 2-3 days, sooner if worse          712  Subjective:   C/o started about 11 days ago having fever/headache/cough/sore throat/congestion, then she had loss of smell/taste, she hads covid test on 1-4-2022 that came positive, she improved somewhat, no more fever or s/t, still having loss of smell and taste, still coughing, now clear/white sputum, having nasal congestion. She had a repeat covid test done yesterday and the results are pending    Prior to Admission medications    Medication Sig Start Date End Date Taking? Authorizing Provider   doxycycline (MONODOX) 100 mg capsule Take 1 Capsule by mouth two (2) times a day. 1/11/22  Yes Jessica GILBERT MD   benzonatate (TESSALON) 200 mg capsule Take 1 Capsule by mouth three (3) times daily as needed for Cough. 1/11/22  Yes Jessica GILBERT MD   fluticasone propionate (FLONASE) 50 mcg/actuation nasal spray 2 Sprays by Both Nostrils route daily. 1/11/22  Yes Jessica GILBERT MD   albuterol (Proventil HFA) 90 mcg/actuation inhaler Take  by inhalation.    Yes Provider, Historical   sertraline (ZOLOFT) 50 mg tablet TAKE 1 TABLET DAILY 12/29/21   Ras Garcia MD   fluconazole (DIFLUCAN) 150 mg tablet TAKE 1 TABLET BY MOUTH EVERY 7 DAYS FOR 21 DAYS 10/11/21   Karoline Stafford MD chlorzoxazone (PARAFON FORTE) 500 mg tablet Take 500 mg by mouth three (3) times daily as needed for Muscle Spasm(s). Provider, Historical   ergocalciferol (ERGOCALCIFEROL) 1,250 mcg (50,000 unit) capsule Take 1 Capsule by mouth every seven (7) days. 6/11/21   Marsha Torres MD   busPIRone (BUSPAR) 10 mg tablet Take 1 Tab by mouth three (3) times daily. May take an additional dose (max of 40mg/day) as needed. 3/23/21   Marsha Torres MD   topiramate (TOPAMAX) 200 mg tablet Take 200 mg by mouth two (2) times a day. 12/10/19   Provider, Historical   cyanocobalamin, vitamin B-12, (VITAMIN B-12) 1,000 mcg/mL drop Take 1-2 Drops by mouth daily. Provider, Historical   levonorgestrel (MIRENA) 20 mcg/24 hours (5 yrs) 52 mg IUD Mirena 20 mcg/24 hours (5 yrs) 52 mg intrauterine device   Take 1 device by intrauterine route.     Provider, Historical     Patient Active Problem List    Diagnosis Date Noted    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    Psychophysiological insomnia 08/08/2020    Panic attacks 08/08/2020    Other constipation 01/19/2020    Endometriosis 05/08/2019    Dysmenorrhea 02/12/2019    Thyroid nodule 02/12/2019    Loose skin 07/27/2016    Allergic reaction 07/27/2016    Abnormal uterine bleeding (AUB) 07/27/2016    Vitamin D deficiency 01/25/2016    Frequent UTI 01/22/2016    CKD (chronic kidney disease) stage 2, GFR 60-89 ml/min 05/13/2015    GARCIA (dyspnea on exertion) 03/03/2014    Seizure disorder ([address] 11/26/2013     Past Medical History:   Diagnosis Date    BV (bacterial vaginosis)     Chronic BV    Depression     Epilepsy ([address] 2001    Dr. Dolly Zurita H/O exercise stress test 11/26/2013    exercise nuc stress test wnl with EF 83%    Hematuria, microscopic 2015    Pelvic floor dysfunction     Recurrent UTI     Seizures (HCC)     Urinary frequency     UTI (urinary tract infection)      Family History   Problem Relation Age of Onset    Thyroid Disease Mother    Theodoro Milder Alcohol abuse Father     Thyroid Disease Maternal Aunt     Thyroid Disease Maternal Grandmother     Cancer Maternal Grandmother         \"bone cancer\"    Cataract Paternal Grandmother        Review of Systems   Constitutional: Negative for fever. Respiratory: Positive for cough and sputum production (clear/white). Negative for shortness of breath and wheezing. Gastrointestinal: Negative for abdominal pain. Neurological: Negative for dizziness. Objective:     Patient-Reported Vitals 1/11/2022   Patient-Reported Weight -   Patient-Reported Height -   Patient-Reported Temperature 97.4   Patient-Reported LMP -      General: alert, cooperative, no distress   Mental  status: scarlett behavior, speech, dress, motor activity, and thought processes, able to follow commands   HENT: NCAT   Neck: no visualized mass   Resp: no respiratory distress   Neuro: no gross deficits   Skin: no discoloration or lesions of concern on visible areas   Psychiatric:  no evidence of hallucinations     Additional exam findings: We discussed the expected course, resolution and complications of the diagnosis(es) in detail. Medication risks, benefits, costs, interactions, and alternatives were discussed as indicated. I advised her to contact the office if her condition worsens, changes or fails to improve as anticipated. She expressed understanding with the diagnosis(es) and plan. Fan Whitaker, was evaluated through a synchronous (real-time) audio-video encounter. The patient (or guardian if applicable) is aware that this is a billable service. Verbal consent to proceed has been obtained within the past 12 months. The visit was conducted pursuant to the emergency declaration under the [address] [place] [address] authority and the [employer] General Act. Patient identification was verified, and a caregiver was present when appropriate.  The patient was located in a state where the provider was credentialed to provide care.     Rodrigo Martell MD

## 2022-03-03 ENCOUNTER — TELEPHONE (OUTPATIENT)
Dept: FAMILY MEDICINE CLINIC | Age: 42
End: 2022-03-03

## 2022-03-03 NOTE — TELEPHONE ENCOUNTER
Patient wanted to see a female provider for her issues she stated that she wanted to switch providers and that she does not remember establishing care with Hussain Hairston . She is scheduled today for a sameday with  . She wants to know if she could switch care after this visit today ?

## 2022-03-04 ENCOUNTER — OFFICE VISIT (OUTPATIENT)
Dept: FAMILY MEDICINE CLINIC | Age: 42
End: 2022-03-04
Payer: COMMERCIAL

## 2022-03-04 VITALS
WEIGHT: 187.8 LBS | SYSTOLIC BLOOD PRESSURE: 110 MMHG | OXYGEN SATURATION: 99 % | RESPIRATION RATE: 15 BRPM | DIASTOLIC BLOOD PRESSURE: 74 MMHG | HEIGHT: 65 IN | HEART RATE: 74 BPM | BODY MASS INDEX: 31.29 KG/M2 | TEMPERATURE: 97.1 F

## 2022-03-04 DIAGNOSIS — R61 SWEATING INCREASE: ICD-10-CM

## 2022-03-04 DIAGNOSIS — L75.0 BODY ODOR: Primary | ICD-10-CM

## 2022-03-04 DIAGNOSIS — E55.9 VITAMIN D DEFICIENCY: ICD-10-CM

## 2022-03-04 PROCEDURE — 99213 OFFICE O/P EST LOW 20 MIN: CPT | Performed by: LEGAL MEDICINE

## 2022-03-04 RX ORDER — ERGOCALCIFEROL 1.25 MG/1
50000 CAPSULE ORAL
Qty: 12 CAPSULE | Refills: 0 | Status: SHIPPED | OUTPATIENT
Start: 2022-03-04 | End: 2022-03-28 | Stop reason: SDUPTHER

## 2022-03-04 NOTE — PROGRESS NOTES
Mike Jones is a 35 y.o. female (: 1980) presenting to address:    Chief Complaint   Patient presents with    Excessive Sweating       Vitals:    22 1353   BP: 110/74   Pulse: 74   Resp: 15   Temp: 97.1 °F (36.2 °C)   TempSrc: Temporal   SpO2: 99%   Weight: 187 lb 12.8 oz (85.2 kg)   Height: 5' 4.5\" (1.638 m)   PainSc:   0 - No pain   LMP: 2020       Hearing/Vision:   No exam data present    Learning Assessment:     Learning Assessment 2016   PRIMARY LEARNER Patient   HIGHEST LEVEL OF EDUCATION - PRIMARY LEARNER  4 YEARS OF COLLEGE   BARRIERS PRIMARY LEARNER NONE   CO-LEARNER CAREGIVER No   PRIMARY LANGUAGE ENGLISH   LEARNER PREFERENCE PRIMARY VIDEOS     LISTENING   ANSWERED BY self   RELATIONSHIP SELF     Depression Screening:     3 most recent PHQ Screens 3/4/2022   Little interest or pleasure in doing things Not at all   Feeling down, depressed, irritable, or hopeless Not at all   Total Score PHQ 2 0   Trouble falling or staying asleep, or sleeping too much -   Feeling tired or having little energy -   Poor appetite, weight loss, or overeating -   Feeling bad about yourself - or that you are a failure or have let yourself or your family down -   Trouble concentrating on things such as school, work, reading, or watching TV -   Moving or speaking so slowly that other people could have noticed; or the opposite being so fidgety that others notice -   Thoughts of being better off dead, or hurting yourself in some way -   PHQ 9 Score -   How difficult have these problems made it for you to do your work, take care of your home and get along with others -     Fall Risk Assessment:     Fall Risk Assessment, last 12 mths 2021   Able to walk? Yes   Fall in past 12 months? 0   Do you feel unsteady? 0   Are you worried about falling 0     Abuse Screening:     Abuse Screening Questionnaire 2021   Do you ever feel afraid of your partner?  N   Are you in a relationship with someone who physically or mentally threatens you? N   Is it safe for you to go home? Y     ADL Assessment:     ADL Assessment 1/22/2018   Feeding yourself No Help Needed   Getting from bed to chair No Help Needed   Getting dressed No Help Needed   Bathing or showering No Help Needed   Walk across the room (includes cane/walker) No Help Needed   Using the telphone No Help Needed   Taking your medications No Help Needed   Preparing meals No Help Needed   Managing money (expenses/bills) No Help Needed   Moderately strenuous housework (laundry) No Help Needed   Shopping for personal items (toiletries/medicines) No Help Needed   Shopping for groceries No Help Needed   Driving No Help Needed   Climbing a flight of stairs No Help Needed   Getting to places beyond walking distances No Help Needed        Coordination of Care Questionaire:   1. \"Have you been to the ER, urgent care clinic since your last visit? Hospitalized since your last visit? \" No    2. \"Have you seen or consulted any other health care providers outside of the [facility] [place] [address] [place] since your last visit? \" [facility], Dr. Rinku Miller, OB/GYN     3. For patients aged 39-70: Has the patient had a colonoscopy / FIT/ Cologuard? Yes - no Care Gap present    If the patient is female:    4. For patients aged 41-77: Has the patient had a mammogram within the past 2 years? Yes - no Care Gap present  See top three    5. For patients aged 21-65: Has the patient had a pap smear? Yes - no Care Gap present    Advanced Directive:   1. Do you have an Advanced Directive? NO    2. Would you like information on Advanced Directives?  NO

## 2022-03-04 NOTE — PROGRESS NOTES
Mike Jones     Chief Complaint   Patient presents with    Excessive Sweating     Vitals:    03/04/22 1353   BP: 110/74   Pulse: 74   Resp: 15   Temp: 97.1 °F (36.2 °C)   TempSrc: Temporal   SpO2: 99%   Weight: 187 lb 12.8 oz (85.2 kg)   Height: 5' 4.5\" (1.638 m)   PainSc:   0 - No pain   LMP: 01/24/2020         HPI:Mrs.Edwards Abiel Mayer is here complaining about Sweating that has strong smell , in her both axilla and thigh area and perineum, no vaginal discharge ,she is using topical   deodorant     It has been going on for for last few month   She has been bothered by the foul smell, but nobody else has noticed that she does not have a sexual partner at this time  Had covid19 and lost smell         Past Medical History:   Diagnosis Date    BV (bacterial vaginosis)     Chronic BV    Depression     Epilepsy ([address] 2001    Dr. Jaimie Oconnor H/O exercise stress test 11/26/2013    exercise nuc stress test wnl with EF 83%    Hematuria, microscopic 2015    Pelvic floor dysfunction     Recurrent UTI     Seizures ([address]     Urinary frequency     UTI (urinary tract infection)      Past Surgical History:   Procedure Laterality Date    HX CHOLECYSTECTOMY  2005    HX TUBAL LIGATION  2008    WA LAP,CHOLECYSTECTOMY       Social History     Tobacco Use    Smoking status: Never Smoker    Smokeless tobacco: Never Used   Substance Use Topics    Alcohol use: Not Currently       Family History   Problem Relation Age of Onset    Thyroid Disease Mother     Alcohol abuse Father     Thyroid Disease Maternal Aunt     Thyroid Disease Maternal Grandmother     Cancer Maternal Grandmother         \"bone cancer\"    Cataract Paternal Grandmother        Review of Systems   Constitutional: Negative for chills, fever, malaise/fatigue and weight loss. HENT: Negative for congestion, ear discharge, ear pain, hearing loss and nosebleeds. Eyes: Negative for blurred vision, double vision and discharge.    Respiratory: Negative for cough. Cardiovascular: Negative for chest pain, palpitations, claudication and leg swelling. Gastrointestinal: Negative for abdominal pain, constipation, diarrhea, nausea and vomiting. Genitourinary: Negative for dysuria, frequency and urgency. Musculoskeletal: Negative for myalgias. Skin: Negative for itching and rash. Neurological: Negative for dizziness, tingling, sensory change, speech change, focal weakness, weakness and headaches. Psychiatric/Behavioral: Positive for depression. Negative for hallucinations, substance abuse and suicidal ideas. The patient is nervous/anxious and has insomnia. Physical Exam  Vitals and nursing note reviewed. Constitutional:       General: She is not in acute distress. Appearance: She is well-developed. She is not toxic-appearing or diaphoretic. Neck:      Thyroid: No thyromegaly. Cardiovascular:      Heart sounds: Normal heart sounds. Pulmonary:      Effort: Pulmonary effort is normal. No respiratory distress. Breath sounds: Normal breath sounds. No wheezing or rales. Chest:      Chest wall: No tenderness. Abdominal:      General: There is no distension. Palpations: Abdomen is soft. Tenderness: There is no abdominal tenderness. There is no rebound. Musculoskeletal:         General: No tenderness or deformity. Normal range of motion. Lymphadenopathy:      Cervical: No cervical adenopathy. Skin:     General: Skin is warm and dry. Coloration: Skin is not pale. Findings: No erythema or rash. Comments: I examined the lower abdominal area and gluteal area there is no rash no swelling   Neurological:      Mental Status: She is alert and oriented to person, place, and time. Cranial Nerves: No cranial nerve deficit. Coordination: Coordination normal.   Psychiatric:         Behavior: Behavior normal.         Thought Content:  Thought content normal.         Judgment: Judgment normal. Assessment and plan     Plan of care has been discussed with the patient, he agrees to the plan and verbalized understanding. All his questions were answered  More than 50% of the time spent in this visit was counseling the patient about  illness and treatment options         1. Body odor  She can use topical 20% aluminum chloride (Drysol) on her axilla    For the perineal area advised her to apply powder to absorb any swelling to prevent is order    2. Sweating increase      4. Vitamin D deficiency  Patient need to get vitamin D checked  - ergocalciferol (ERGOCALCIFEROL) 1,250 mcg (50,000 unit) capsule; Take 1 Capsule by mouth every seven (7) days. Dispense: 12 Capsule; Refill: 0    Current Outpatient Medications   Medication Sig Dispense Refill    ergocalciferol (ERGOCALCIFEROL) 1,250 mcg (50,000 unit) capsule Take 1 Capsule by mouth every seven (7) days. 12 Capsule 0    fluticasone propionate (FLONASE) 50 mcg/actuation nasal spray 2 Sprays by Both Nostrils route daily. 1 Each 0    albuterol (Proventil HFA) 90 mcg/actuation inhaler Take  by inhalation as needed.  busPIRone (BUSPAR) 10 mg tablet Take 1 Tab by mouth three (3) times daily. May take an additional dose (max of 40mg/day) as needed. 90 Tab 1    topiramate (TOPAMAX) 200 mg tablet Take 200 mg by mouth two (2) times a day.          Patient Active Problem List    Diagnosis Date Noted    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    Psychophysiological insomnia 08/08/2020    Panic attacks 08/08/2020    Other constipation 01/19/2020    Endometriosis 05/08/2019    Dysmenorrhea 02/12/2019    Thyroid nodule 02/12/2019    Loose skin 07/27/2016    Allergic reaction 07/27/2016    Abnormal uterine bleeding (AUB) 07/27/2016    Vitamin D deficiency 01/25/2016    Frequent UTI 01/22/2016    CKD (chronic kidney disease) stage 2, GFR 60-89 ml/min 05/13/2015    GARCIA (dyspnea on exertion) 03/03/2014    Seiz[address] disorder ([facility] 11/26/2013     Results for orders placed or performed in visit on 09/01/21   AMB EXT CREATININE   Result Value Ref Range    Creatinine, External 0.94      No visits with results within 3 Month(s) from this visit. Latest known visit with results is:   Abstract on 09/01/2021   Component Date Value Ref Range Status    Creatinine, External 05/18/2021 0.94   Final          Follow-up and Dispositions    · Return for follow up rosej new PCP .

## 2022-03-19 PROBLEM — N94.6 DYSMENORRHEA: Status: ACTIVE | Noted: 2019-02-12

## 2022-03-19 PROBLEM — F41.8 ANXIOUS DEPRESSION: Status: ACTIVE | Noted: 2020-08-08

## 2022-03-19 PROBLEM — K59.09 OTHER CONSTIPATION: Status: ACTIVE | Noted: 2020-01-19

## 2022-03-19 PROBLEM — E04.1 THYROID NODULE: Status: ACTIVE | Noted: 2019-02-12

## 2022-03-19 PROBLEM — F41.0 PANIC ATTACKS: Status: ACTIVE | Noted: 2020-08-08

## 2022-03-19 PROBLEM — F51.04 PSYCHOPHYSIOLOGICAL INSOMNIA: Status: ACTIVE | Noted: 2020-08-08

## 2022-03-20 PROBLEM — N80.9 ENDOMETRIOSIS: Status: ACTIVE | Noted: 2019-05-08

## 2022-03-28 ENCOUNTER — OFFICE VISIT (OUTPATIENT)
Dept: FAMILY MEDICINE CLINIC | Age: 42
End: 2022-03-28
Payer: COMMERCIAL

## 2022-03-28 VITALS
BODY MASS INDEX: 30.62 KG/M2 | HEIGHT: 65 IN | SYSTOLIC BLOOD PRESSURE: 110 MMHG | TEMPERATURE: 97.8 F | OXYGEN SATURATION: 99 % | RESPIRATION RATE: 16 BRPM | HEART RATE: 78 BPM | WEIGHT: 183.8 LBS | DIASTOLIC BLOOD PRESSURE: 69 MMHG

## 2022-03-28 DIAGNOSIS — F41.8 ANXIOUS DEPRESSION: ICD-10-CM

## 2022-03-28 DIAGNOSIS — R76.8 ANA POSITIVE: ICD-10-CM

## 2022-03-28 DIAGNOSIS — G40.909 SEIZURE DISORDER (HCC): ICD-10-CM

## 2022-03-28 DIAGNOSIS — M50.30 DDD (DEGENERATIVE DISC DISEASE), CERVICAL: ICD-10-CM

## 2022-03-28 DIAGNOSIS — E55.9 VITAMIN D DEFICIENCY: ICD-10-CM

## 2022-03-28 DIAGNOSIS — R40.0 DAYTIME SOMNOLENCE: ICD-10-CM

## 2022-03-28 DIAGNOSIS — N89.8 VAGINAL IRRITATION: ICD-10-CM

## 2022-03-28 DIAGNOSIS — M41.86 LEVOSCOLIOSIS OF LUMBAR SPINE: ICD-10-CM

## 2022-03-28 DIAGNOSIS — M41.82: ICD-10-CM

## 2022-03-28 DIAGNOSIS — Z76.89 ESTABLISHING CARE WITH NEW DOCTOR, ENCOUNTER FOR: Primary | ICD-10-CM

## 2022-03-28 DIAGNOSIS — G25.81 RESTLESS LEG SYNDROME: ICD-10-CM

## 2022-03-28 DIAGNOSIS — R79.0 LOW FERRITIN: ICD-10-CM

## 2022-03-28 DIAGNOSIS — Z00.00 ROUTINE ADULT HEALTH MAINTENANCE: ICD-10-CM

## 2022-03-28 DIAGNOSIS — E66.09 CLASS 1 OBESITY DUE TO EXCESS CALORIES WITH BODY MASS INDEX (BMI) OF 31.0 TO 31.9 IN ADULT, UNSPECIFIED WHETHER SERIOUS COMORBIDITY PRESENT: ICD-10-CM

## 2022-03-28 DIAGNOSIS — M25.50 POLYARTHRALGIA: ICD-10-CM

## 2022-03-28 PROCEDURE — 99214 OFFICE O/P EST MOD 30 MIN: CPT | Performed by: STUDENT IN AN ORGANIZED HEALTH CARE EDUCATION/TRAINING PROGRAM

## 2022-03-28 RX ORDER — NITROFURANTOIN 25; 75 MG/1; MG/1
CAPSULE ORAL
COMMUNITY
Start: 2022-03-23

## 2022-03-28 RX ORDER — FLUCONAZOLE 150 MG/1
150 TABLET ORAL DAILY
Qty: 1 TABLET | Refills: 0 | Status: SHIPPED | OUTPATIENT
Start: 2022-03-28 | End: 2022-03-29

## 2022-03-28 RX ORDER — ERGOCALCIFEROL 1.25 MG/1
50000 CAPSULE ORAL
Qty: 12 CAPSULE | Refills: 1 | Status: SHIPPED | OUTPATIENT
Start: 2022-03-28

## 2022-03-28 RX ORDER — MELOXICAM 15 MG/1
15 TABLET ORAL
Qty: 30 TABLET | Refills: 1 | Status: SHIPPED | OUTPATIENT
Start: 2022-03-28

## 2022-03-28 NOTE — PROGRESS NOTES
Note to patient:  The purpose of this note is to communicate optimally with the other physicians / APCs involved in your care. It is written using standard medical terminology. If you have questions regarding the details of the note, please contact my office to schedule an appointment to address your questions. Ragini Romero  Primary Care Office Visit - Problem-Oriented    : 1980   Mike Jones is a 35 y.o. female presenting for  Chief Complaint   Patient presents with    Transfer Of Care          Assessment/Plan:       ICD-10-CM ICD-9-CM   1. Establishing care with new doctor, encounter for  Z76.89 V65.8   2. Vitamin D deficiency  E55.9 268.9   3. Anxious depression  F41.8 300.4   4. Low ferritin  R79.0 790.6   5. Seizure disorder ([facility] [address] [facility]  G40.909 345.90   6. Class 1 obesity due to excess calories with body mass index (BMI) of 31.0 to 31.9 in adult, unspecified whether serious comorbidity present  E66.09 278.00    Z68.31 V85.31   7. CASPER positive  R76.8 795.79   8. Restless leg syndrome  G25.81 333.94   9. Daytime somnolence  R40.0 780.54   10. Polyarthralgia  M25.50 719.49   11. Routine adult health maintenance  Z00.00 V70.0   12. Vaginal irritation  N89.8 623.9   13. Levoscoliosis of lumbar spine  M41.86 737.39   14. Levoscoliosis of cervical spine  M41.82 737.39   15. DDD (degenerative disc disease), cervical  M50.30 722.4     #Seizure history    On Topamax 200mg BID without recent seizure  Established with Neurology - Has F/U soon. Encouraged to ask if could consider lower dose as could be contributing to chronic fatigue. #Prediabetes - new  Record review A1C 5.7 - 2021  Reviewed diagnosis and recommendations for behavioral/dietary changes to improve. Encouraged to continue once to twice yearly monitoring of A1C. Also discussed consideration of initiating Metformin to help reduce insulin resistance.      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 DDx includes autoimmune - will monitor inflam markers ESR/CRP with annual labs or with change in sx   Established with Pain management - Previously treated with NSAID with good response. Established with Rheumotalogy - Reported she had false positive for Lupus. Record not available for review  Considered sleep med eval but never underwent due to improvement in sleep. Will restart NSAID prn to help when symptomatic. Will get baseline B12 level, and would consider injections (already on OTC b complex vitamin)     # anxious depression - significant situational component related to stressors at work   Was using buspar more when at work, now only using prn with good effect    Key Psychotherapeutic Meds             busPIRone (BUSPAR) 10 mg tablet (Taking) Take 1 Tab by mouth three (3) times daily. May take an additional dose (max of 40mg/day) as needed. # VitD def  - unclear control  # low ferrtin - unclear control  May be contributing to  # RLS - working diagnosis  On weekly Vit D and liquid iron prn (not daily). Will check levels     #mild intermittent asthma - stable, denies recent exacerbation  In review of medications, reports using rescue inhaler only prn with good effect. Reviewed optimal use of rescue inhalers. Reviewed to follow up if use of rescue inhaler (albuterol) increases to twice weekly or more. Key COPD Medications             albuterol (Proventil HFA) 90 mcg/actuation inhaler (Taking) Take 2 Puffs by inhalation every four (4) hours as needed.         #HM  Reviewed current vaccination recommendations and would offer additional information if interested. Cervical CA screening - s/p total hysterectomy  Breast CA screening - Last Mammo Oct/Nov 2021   Colon CA screening - Would encourage to consider starting at 44y/o. Orders Placed This Encounter    VITAMIN B12     Standing Status:   Future     Standing Expiration Date:   3/29/2023    VITAMIN D, 25 HYDROXY     Standing Status:   Future     Standing Expiration Date:   3/28/2023    FERRITIN     Standing Status:   Future     Standing Expiration Date:   3/29/2023    IRON PROFILE     Standing Status:   Future     Standing Expiration Date:   3/29/2023    TSH AND FREE T4     Standing Status:   Future     Standing Expiration Date:   3/29/2023    HEMOGLOBIN A1C WITH EAG     Standing Status:   Future     Standing Expiration Date:   3/28/2023    LIPID PANEL     Standing Status:   Future     Standing Expiration Date:   3/28/2023    SED RATE (ESR)     Standing Status:   Future     Standing Expiration Date:   3/28/2023    C REACTIVE PROTEIN, QT     Standing Status:   Future     Standing Expiration Date:   3/28/2023    nitrofurantoin, macrocrystal-monohydrate, (MACROBID) 100 mg capsule     Sig: TAKE 1 CAPSULE BY MOUTH EVERY 12 HOURS    meloxicam (MOBIC) 15 mg tablet     Sig: Take 1 Tablet by mouth daily as needed for Pain. Best tolerated if taken with food. OK to use with Tylenol if needed. Dispense:  30 Tablet     Refill:  1    ergocalciferol (ERGOCALCIFEROL) 1,250 mcg (50,000 unit) capsule     Sig: Take 1 Capsule by mouth every seven (7) days. Indications: low vitamin D levels     Dispense:  12 Capsule     Refill:  1    fluconazole (DIFLUCAN) 150 mg tablet     Sig: Take 1 Tablet by mouth daily for 1 day. FDA advises cautious prescribing of oral fluconazole in pregnancy.   Indications: a yeast infection of the vagina and vulva     Dispense:  1 Tablet     Refill:  0     Follow-up and Dispositions    · Return in about 1 week (around 4/4/2022) for labs when able to schedule then follow up in 6mo or sooner if needed . Reviewed management plan & instructions with patient, who voiced understanding. Subjective   History:   Gilberto Mckeon is a 35 y.o. female presenting to address:  Chief Complaint   Patient presents with    Transfer Of Care     Extensive review of medical history and medications completed to facilitate transfer of care. Review of Systems:     A comprehensive review of systems was negative except for that written in the HPI and A/P         Objective     Physical Assessment:     Visit Vitals  /69 (BP 1 Location: Left upper arm, BP Patient Position: Sitting, BP Cuff Size: Adult)   Pulse 78   Temp 97.8 °F (36.6 °C) (Temporal)   Resp 16   Ht 5' 4.5\" (1.638 m)   Wt 183 lb 12.8 oz (83.4 kg)   LMP 01/24/2020   SpO2 99%   BMI 31.06 kg/m²       Physical Exam  Vitals and nursing note reviewed. Constitutional:       General: She is not in acute distress. Cardiovascular:      Rate and Rhythm: Normal rate and regular rhythm. Pulses: Normal pulses. Pulmonary:      Effort: Pulmonary effort is normal. No respiratory distress. Musculoskeletal:      Right lower leg: No edema. Left lower leg: No edema. Neurological:      Mental Status: She is alert and oriented to person, place, and time. Gait: Gait normal.   Psychiatric:         Mood and Affect: Mood normal.         Behavior: Behavior normal.         Thought Content: Thought content normal.         Judgment: Judgment normal.                 This document may have been created with the aid of dictation software. Text may contain errors, particularly phonetic errors.       Alphonse Almeida DO  Family Medicine  03/28/2022

## 2022-03-28 NOTE — PROGRESS NOTES
Manuel Moss is a 35 y.o. female (: 1980) presenting to address:    Chief Complaint   Patient presents with    Transfer Of Care       Vitals:    22 1140   BP: 110/69   Pulse: 78   Resp: 16   Temp: 97.8 °F (36.6 °C)   TempSrc: Temporal   SpO2: 99%   Weight: 183 lb 12.8 oz (83.4 kg)   Height: 5' 4.5\" (1.638 m)   PainSc:   0 - No pain   LMP: 2020       Hearing/Vision:   No exam data present    Learning Assessment:     Learning Assessment 2016   PRIMARY LEARNER Patient   HIGHEST LEVEL OF EDUCATION - PRIMARY LEARNER  4 YEARS OF COLLEGE   BARRIERS PRIMARY LEARNER NONE   CO-LEARNER CAREGIVER No   PRIMARY LANGUAGE ENGLISH   LEARNER PREFERENCE PRIMARY VIDEOS     LISTENING   ANSWERED BY self   RELATIONSHIP SELF     Depression Screening:     3 most recent PHQ Screens 3/28/2022   Little interest or pleasure in doing things Not at all   Feeling down, depressed, irritable, or hopeless Not at all   Total Score PHQ 2 0   Trouble falling or staying asleep, or sleeping too much Several days   Feeling tired or having little energy Several days   Poor appetite, weight loss, or overeating Several days   Feeling bad about yourself - or that you are a failure or have let yourself or your family down Not at all   Trouble concentrating on things such as school, work, reading, or watching TV Several days   Moving or speaking so slowly that other people could have noticed; or the opposite being so fidgety that others notice Not at all   Thoughts of being better off dead, or hurting yourself in some way Not at all   PHQ 9 Score 4   How difficult have these problems made it for you to do your work, take care of your home and get along with others -     Fall Risk Assessment:     Fall Risk Assessment, last 12 mths 3/28/2022   Able to walk? Yes   Fall in past 12 months? 0   Do you feel unsteady?  0   Are you worried about falling 0     Abuse Screening:     Abuse Screening Questionnaire 3/28/2022   Do you ever feel afraid of your partner? N   Are you in a relationship with someone who physically or mentally threatens you? N   Is it safe for you to go home? Y     ADL Assessment:     ADL Assessment 1/22/2018   Feeding yourself No Help Needed   Getting from bed to chair No Help Needed   Getting dressed No Help Needed   Bathing or showering No Help Needed   Walk across the room (includes cane/walker) No Help Needed   Using the telphone No Help Needed   Taking your medications No Help Needed   Preparing meals No Help Needed   Managing money (expenses/bills) No Help Needed   Moderately strenuous housework (laundry) No Help Needed   Shopping for personal items (toiletries/medicines) No Help Needed   Shopping for groceries No Help Needed   Driving No Help Needed   Climbing a flight of stairs No Help Needed   Getting to places beyond walking distances No Help Needed        Coordination of Care Questionaire:   1. \"Have you been to the ER, urgent care clinic since your last visit? Hospitalized since your last visit? \" No    2. \"Have you seen or consulted any other health care providers outside of the [address] [place] [address] [place] since your last visit? \" Yes Dr. Skip Youngblood     3. For patients aged 39-70: Has the patient had a colonoscopy / FIT/ Cologuard? NA - based on age      If the patient is female:    4. For patients aged 41-77: Has the patient had a mammogram within the past 2 years? No  See top three    5. For patients aged 21-65: Has the patient had a pap smear? No total hysterectomy     Advanced Directive:   1. Do you have an Advanced Directive? NO    2. Would you like information on Advanced Directives?  NO

## 2022-04-08 DIAGNOSIS — R39.15 URINARY URGENCY: Primary | ICD-10-CM

## 2022-04-08 DIAGNOSIS — Z87.440 RECENT URINARY TRACT INFECTION: ICD-10-CM

## 2022-04-08 RX ORDER — GRANULES FOR ORAL 3 G/1
3 POWDER ORAL ONCE
Qty: 1 PACKET | Refills: 0 | Status: SHIPPED | OUTPATIENT
Start: 2022-04-08 | End: 2022-04-08

## 2022-04-08 NOTE — PROGRESS NOTES
Component 04/03/2022       Urine pH 6.0   Urine Protein Screen Negative   Urine Glucose Negative   Urine Ketones 5* Abnormal       Urine Occult Blood Small Abnormal       Urine Specific Gravity 1.017   Urine Nitrite Negative   Urine Leukocyte Esterase Moderate Abnormal       Urine Bilirubin Negative   Urine Urobilinogen <2.0   Urine Bacteria --   Squamous Epithelial Cells 0-2   URINE WBC --   URINE RBC --   HYALINE CAST --   Urine WBC 20-50 Abnormal       Urine RBC 5-10 Abnormal       Cast --   Urine Color Yellow   Urine Clarity Slightly Cloudy   Urine Ascorbic Acid Negative     CT Abd/Pelvis IMPRESSION   1. Moderate pneumoperitoneum. Indeterminate source of gas. No gas localized adjacent to the vaginal cuff. 2. Tubular fat tracking in versus along the margin of the vagina. Suspected vesicovaginal prolapse of mesenteric fat, with other fatty polypoid structure not excluded. Further investigation could be performed with pelvic MRI. 3. Left adnexal/ovarian 5 cm lesion, probably a cyst. This can also be assessed with ultrasound or MRI.

## 2022-04-08 NOTE — PROGRESS NOTES
Orders Placed This Encounter    CULTURE, URINE     Standing Status:   Future     Standing Expiration Date:   4/9/2023    URINALYSIS W/ RFLX MICROSCOPIC     Standing Status:   Future     Standing Expiration Date:   4/8/2023

## 2022-05-13 RX ORDER — FLUCONAZOLE 150 MG/1
150 TABLET ORAL DAILY
Qty: 1 TABLET | Refills: 0 | Status: SHIPPED | OUTPATIENT
Start: 2022-05-13 | End: 2022-05-14

## 2022-05-27 DIAGNOSIS — R40.0 DAYTIME SOMNOLENCE: ICD-10-CM

## 2022-05-27 DIAGNOSIS — F41.8 ANXIOUS DEPRESSION: ICD-10-CM

## 2022-05-27 DIAGNOSIS — R79.0 LOW FERRITIN: ICD-10-CM

## 2022-05-27 DIAGNOSIS — M25.50 POLYARTHRALGIA: ICD-10-CM

## 2022-05-27 DIAGNOSIS — G40.909 SEIZURE DISORDER (HCC): ICD-10-CM

## 2022-05-27 DIAGNOSIS — E55.9 VITAMIN D DEFICIENCY: ICD-10-CM

## 2022-05-27 DIAGNOSIS — E66.09 CLASS 1 OBESITY DUE TO EXCESS CALORIES WITH BODY MASS INDEX (BMI) OF 31.0 TO 31.9 IN ADULT, UNSPECIFIED WHETHER SERIOUS COMORBIDITY PRESENT: ICD-10-CM

## 2022-05-27 DIAGNOSIS — G25.81 RESTLESS LEG SYNDROME: ICD-10-CM

## 2022-05-27 DIAGNOSIS — R76.8 ANA POSITIVE: ICD-10-CM

## 2022-05-27 DIAGNOSIS — Z00.00 ROUTINE ADULT HEALTH MAINTENANCE: ICD-10-CM

## 2022-05-27 RX ORDER — ERGOCALCIFEROL 1.25 MG/1
50000 CAPSULE ORAL
Qty: 12 CAPSULE | Refills: 1 | Status: CANCELLED | OUTPATIENT
Start: 2022-05-27

## 2023-01-04 ENCOUNTER — HOSPITAL ENCOUNTER (OUTPATIENT)
Dept: LAB | Age: 43
Discharge: HOME | End: 2023-01-04
Payer: COMMERCIAL

## 2023-01-04 ENCOUNTER — OFFICE VISIT (OUTPATIENT)
Dept: FAMILY MEDICINE CLINIC | Age: 43
End: 2023-01-04
Payer: COMMERCIAL

## 2023-01-04 ENCOUNTER — APPOINTMENT (OUTPATIENT)
Dept: FAMILY MEDICINE CLINIC | Age: 43
End: 2023-01-04

## 2023-01-04 VITALS
RESPIRATION RATE: 16 BRPM | HEIGHT: 65 IN | WEIGHT: 161 LBS | SYSTOLIC BLOOD PRESSURE: 100 MMHG | DIASTOLIC BLOOD PRESSURE: 68 MMHG | HEART RATE: 85 BPM | OXYGEN SATURATION: 98 % | TEMPERATURE: 98 F | BODY MASS INDEX: 26.82 KG/M2

## 2023-01-04 DIAGNOSIS — R76.8 ANA POSITIVE: ICD-10-CM

## 2023-01-04 DIAGNOSIS — R79.0 LOW FERRITIN: ICD-10-CM

## 2023-01-04 DIAGNOSIS — E66.09 CLASS 1 OBESITY DUE TO EXCESS CALORIES WITH BODY MASS INDEX (BMI) OF 31.0 TO 31.9 IN ADULT, UNSPECIFIED WHETHER SERIOUS COMORBIDITY PRESENT: ICD-10-CM

## 2023-01-04 DIAGNOSIS — Z91.14 NONADHERENCE TO MEDICATION: ICD-10-CM

## 2023-01-04 DIAGNOSIS — M25.50 POLYARTHRALGIA: ICD-10-CM

## 2023-01-04 DIAGNOSIS — G40.909 SEIZURE DISORDER (HCC): ICD-10-CM

## 2023-01-04 DIAGNOSIS — Z87.440 RECENT URINARY TRACT INFECTION: ICD-10-CM

## 2023-01-04 DIAGNOSIS — R39.15 URINARY URGENCY: ICD-10-CM

## 2023-01-04 DIAGNOSIS — Z56.6 STRESS AT WORK: ICD-10-CM

## 2023-01-04 DIAGNOSIS — E55.9 VITAMIN D DEFICIENCY: ICD-10-CM

## 2023-01-04 DIAGNOSIS — Z00.00 ROUTINE ADULT HEALTH MAINTENANCE: ICD-10-CM

## 2023-01-04 DIAGNOSIS — R40.0 DAYTIME SOMNOLENCE: ICD-10-CM

## 2023-01-04 DIAGNOSIS — T42.75XA ADVERSE EFFECT OF ANTIEPILEPTIC, INITIAL ENCOUNTER: ICD-10-CM

## 2023-01-04 DIAGNOSIS — F41.8 ANXIOUS DEPRESSION: ICD-10-CM

## 2023-01-04 DIAGNOSIS — F33.0 MILD EPISODE OF RECURRENT MAJOR DEPRESSIVE DISORDER (HCC): ICD-10-CM

## 2023-01-04 DIAGNOSIS — G25.81 RESTLESS LEG SYNDROME: ICD-10-CM

## 2023-01-04 DIAGNOSIS — R73.09 ELEVATED HEMOGLOBIN A1C: ICD-10-CM

## 2023-01-04 DIAGNOSIS — R40.0 DAYTIME SOMNOLENCE: Primary | ICD-10-CM

## 2023-01-04 LAB
25(OH)D3 SERPL-MCNC: 98.7 NG/ML (ref 30–100)
APPEARANCE UR: CLEAR
BILIRUB UR QL: NEGATIVE
CHOLEST SERPL-MCNC: 190 MG/DL
COLOR UR: YELLOW
CRP SERPL-MCNC: <0.3 MG/DL (ref 0–0.3)
ERYTHROCYTE [SEDIMENTATION RATE] IN BLOOD: 9 MM/HR (ref 0–20)
EST. AVERAGE GLUCOSE BLD GHB EST-MCNC: 108 MG/DL
FERRITIN SERPL-MCNC: 34 NG/ML (ref 8–388)
GLUCOSE UR STRIP.AUTO-MCNC: NEGATIVE MG/DL
HBA1C MFR BLD: 5.4 % (ref 4.2–5.6)
HDLC SERPL-MCNC: 66 MG/DL (ref 40–60)
HDLC SERPL: 2.9 {RATIO} (ref 0–5)
HGB UR QL STRIP: NEGATIVE
IRON SATN MFR SERPL: 21 % (ref 20–50)
IRON SERPL-MCNC: 68 UG/DL (ref 50–175)
KETONES UR QL STRIP.AUTO: ABNORMAL MG/DL
LDLC SERPL CALC-MCNC: 97.2 MG/DL (ref 0–100)
LEUKOCYTE ESTERASE UR QL STRIP.AUTO: NEGATIVE
LIPID PROFILE,FLP: ABNORMAL
NITRITE UR QL STRIP.AUTO: NEGATIVE
PH UR STRIP: 7 [PH] (ref 5–8)
PROT UR STRIP-MCNC: NEGATIVE MG/DL
SP GR UR REFRACTOMETRY: 1.02 (ref 1–1.03)
T4 FREE SERPL-MCNC: 0.9 NG/DL (ref 0.7–1.5)
TIBC SERPL-MCNC: 327 UG/DL (ref 250–450)
TRIGL SERPL-MCNC: 134 MG/DL (ref ?–150)
TSH SERPL DL<=0.05 MIU/L-ACNC: 1.05 UIU/ML (ref 0.36–3.74)
UROBILINOGEN UR QL STRIP.AUTO: 1 EU/DL (ref 0.2–1)
VIT B12 SERPL-MCNC: 1500 PG/ML (ref 211–911)
VLDLC SERPL CALC-MCNC: 26.8 MG/DL

## 2023-01-04 PROCEDURE — 85652 RBC SED RATE AUTOMATED: CPT

## 2023-01-04 PROCEDURE — 80201 ASSAY OF TOPIRAMATE: CPT

## 2023-01-04 PROCEDURE — 87086 URINE CULTURE/COLONY COUNT: CPT

## 2023-01-04 PROCEDURE — 82306 VITAMIN D 25 HYDROXY: CPT

## 2023-01-04 PROCEDURE — 83540 ASSAY OF IRON: CPT

## 2023-01-04 PROCEDURE — 36415 COLL VENOUS BLD VENIPUNCTURE: CPT

## 2023-01-04 PROCEDURE — 84439 ASSAY OF FREE THYROXINE: CPT

## 2023-01-04 PROCEDURE — 81003 URINALYSIS AUTO W/O SCOPE: CPT

## 2023-01-04 PROCEDURE — 82728 ASSAY OF FERRITIN: CPT

## 2023-01-04 PROCEDURE — 80061 LIPID PANEL: CPT

## 2023-01-04 PROCEDURE — 83036 HEMOGLOBIN GLYCOSYLATED A1C: CPT

## 2023-01-04 PROCEDURE — 82607 VITAMIN B-12: CPT

## 2023-01-04 PROCEDURE — 86140 C-REACTIVE PROTEIN: CPT

## 2023-01-04 RX ORDER — CYANOCOBALAMIN (VITAMIN B-12) 1000MCG/ML
DROPS ORAL
COMMUNITY
End: 2023-01-25 | Stop reason: ALTCHOICE

## 2023-01-04 SDOH — HEALTH STABILITY - MENTAL HEALTH: OTHER PHYSICAL AND MENTAL STRAIN RELATED TO WORK: Z56.6

## 2023-01-04 NOTE — PROGRESS NOTES
Eileen Katz is a 38 y.o. female (: 1980) presenting to address:    Chief Complaint   Patient presents with    Seizure     2x since last week        Vitals:    23 1256   BP: 100/68   Pulse: 85   Resp: 16   Temp: 98 °F (36.7 °C)   TempSrc: Temporal   SpO2: 98%   Weight: 161 lb (73 kg)   Height: 5' 4.5\" (1.638 m)   PainSc:   0 - No pain   LMP: 2020       Hearing/Vision:   No results found. Learning Assessment:     Learning Assessment 2016   PRIMARY LEARNER Patient   HIGHEST LEVEL OF EDUCATION - PRIMARY LEARNER  4 YEARS OF COLLEGE   BARRIERS PRIMARY LEARNER NONE   CO-LEARNER CAREGIVER No   PRIMARY LANGUAGE ENGLISH   LEARNER PREFERENCE PRIMARY VIDEOS     LISTENING   ANSWERED BY self   RELATIONSHIP SELF     Depression Screening:     3 most recent PHQ Screens 2023   Little interest or pleasure in doing things Not at all   Feeling down, depressed, irritable, or hopeless Not at all   Total Score PHQ 2 0   Trouble falling or staying asleep, or sleeping too much -   Feeling tired or having little energy -   Poor appetite, weight loss, or overeating -   Feeling bad about yourself - or that you are a failure or have let yourself or your family down -   Trouble concentrating on things such as school, work, reading, or watching TV -   Moving or speaking so slowly that other people could have noticed; or the opposite being so fidgety that others notice -   Thoughts of being better off dead, or hurting yourself in some way -   PHQ 9 Score -   How difficult have these problems made it for you to do your work, take care of your home and get along with others -     Fall Risk Assessment:     Fall Risk Assessment, last 12 mths 3/28/2022   Able to walk? Yes   Fall in past 12 months? 0   Do you feel unsteady? 0   Are you worried about falling 0     Abuse Screening:     Abuse Screening Questionnaire 3/28/2022   Do you ever feel afraid of your partner?  N   Are you in a relationship with someone who physically or mentally threatens you? N   Is it safe for you to go home? Y     ADL Assessment:     ADL Assessment 1/22/2018   Feeding yourself No Help Needed   Getting from bed to chair No Help Needed   Getting dressed No Help Needed   Bathing or showering No Help Needed   Walk across the room (includes cane/walker) No Help Needed   Using the telphone No Help Needed   Taking your medications No Help Needed   Preparing meals No Help Needed   Managing money (expenses/bills) No Help Needed   Moderately strenuous housework (laundry) No Help Needed   Shopping for personal items (toiletries/medicines) No Help Needed   Shopping for groceries No Help Needed   Driving No Help Needed   Climbing a flight of stairs No Help Needed   Getting to places beyond walking distances No Help Needed        Coordination of Care Questionaire:   1. \"Have you been to the ER, urgent care clinic since your last visit? Hospitalized since your last visit? \" No    2. \"Have you seen or consulted any other health care providers outside of the [address] [place] [address] [place] since your last visit? \" No     3. For patients aged 39-70: Has the patient had a colonoscopy / FIT/ Cologuard? NA - based on age      If the patient is female:    4. For patients aged 41-77: Has the patient had a mammogram within the past 2 years? Yes - no Care Gap present  See top three    5. For patients aged 21-65: Has the patient had a pap smear? No    Advanced Directive:   1. Do you have an Advanced Directive? NO    2. Would you like information on Advanced Directives?  YES

## 2023-01-04 NOTE — PROGRESS NOTES
Megan Valdovinos (: 1980) is a 38 y.o. female, ESTABLISHED patient, here for FOLLOW UP:    ICD-10-CM ICD-9-CM   1. Daytime somnolence  R40.0 780.54   2. Seizure disorder ([address]  G40.909 345.90   3. Nonadherence to medication  Z91.14 V15.81   4. Stress at work  Z56.6 V62.1   5. Adverse effect of antiepileptic, initial encounter  T42.75XA E936.3   6. Elevated hemoglobin A1c  R73.09 790.29   7. Vitamin D deficiency  E55.9 268.9   8. Mild episode of recurrent major depressive disorder (HCC)  F33.0 296.31   9. Low ferritin  R79.0 790.6     Assessment   Plan     #Seizure disorder - suspect uncontrolled likely due to #nonadherence to treatment 2/ #AE impacting performance causing #Stress at work  Restarted Topamax few days ago with current dosing. In review of AE and unclear on work plan (FMLA vs accommodations, etc), agreeable to try to continue current regimen with adjustment to take 400mg after leaving work daily near the same time until can review with Neuro  Established with Neurology - Has F/U soon. Encouraged to ask if could consider alternative treatment. Patient interested in establishing with new Neurologist, referred 2023 (reviewed need to maintain care while looking for new provider)  With new changes, plan to order CT brain stat and schedule MRI with contrast    #Prediabetes   Reviewed diagnosis and recommendations for behavioral/dietary changes to improve. Encouraged to continue once to twice yearly monitoring of A1C. Also discussed consideration of initiating Metformin to help reduce insulin resistance.      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 DDx includes autoimmune - will monitor inflam markers ESR/CRP with annual labs or with change in sx   Established with Pain management - Previously treated with NSAID with good response. Established with Rheumotalogy - Reported she had false positive for Lupus. Record not available for review  Considered sleep med eval but never underwent due to improvement in sleep. Will restart NSAID prn to help when symptomatic. # anxious depression - significant situational component related to stressors at work   Was using buspar more when at work; stopped use    # VitD def  - unclear control  # low ferrtin - unclear control  May be contributing to  # RLS - working diagnosis  On weekly Vit D and liquid iron prn (not daily). #mild intermittent asthma - stable, denies recent exacerbation  In review of medications, reports using rescue inhaler only prn with good effect. Reviewed optimal use of rescue inhalers. Reviewed to follow up if use of rescue inhaler (albuterol) increases to twice weekly or more. Key COPD Medications               albuterol (Proventil HFA) 90 mcg/actuation inhaler (Taking) Take 2 Puffs by inhalation every four (4) hours as needed. Orders Placed This Encounter    MRI BRAIN W CONT     Please provide patient with disc of all prior brain imaging     Standing Status:   Future     Standing Expiration Date:   2/4/2024     Scheduling Instructions:      [facility]     Order Specific Question:   Is Patient Pregnant?      Answer:   No     Order Specific Question:   STAT Creatinine as indicated     Answer:   Yes     Order Specific Question:   Reason for Exam     Answer:   changing quality to neurological symptoms, inconcistent with prior seizures    CT HEAD WO CONT     Please provide patient with disc of image     Standing Status:   Future     Standing Expiration Date:   2/4/2024     Scheduling Inst[facility]      [facility]     Order Specific Question:   Is Patient Pregnant? Answer:   No     Order Specific Question:   Reason for Exam     Answer:   changing quality to neurological symptoms, inconcistent with prior seizures    TOPIRAMATE     Standing Status:   Future     Number of Occurrences:   1     Standing Expiration Date:   1/5/2024    REFERRAL TO NEUROLOGY     Referral Priority:   Routine     Referral Type:   Consultation     Referral Reason:   Specialty Services Required     Requested Specialty:   Neurology     Number of Visits Requested:   1    REFERRAL TO NEUROPSYCHOLOGY     Referral Priority:   Routine     Referral Type:   Consultation     Referral Reason:   Specialty Services Required     Number of Visits Requested:   1    DISCONTD: cyanocobalamin, vitamin B-12, (Vitamin B-12) 1,000 mcg/mL drop     Sig: Take  by mouth. Return in about 1 week (around 1/11/2023) for 30 min follow up to review labs and CT Brain (need results before visit), Virtual OK. Subjective   Last PCP visit: 3/28/2022  Since last visit:   Friend noticed sleep movements more agitated - few weeks ago  Heaviness sensation - onset Saturday, intermittent, feel frontal/temporal heavy pressure, out of body sensation      Current Outpatient Medications   Medication Instructions    topiramate (TOPAMAX) 200 mg, Oral, 2 TIMES DAILY      Objective   Visit Vitals  /68 (BP 1 Location: Right arm, BP Patient Position: Sitting, BP Cuff Size: Adult)   Pulse 85   Temp 98 °F (36.7 °C) (Temporal)   Resp 16   Ht 5' 4.5\" (1.638 m)   Wt 161 lb (73 kg)   LMP 01/24/2020   SpO2 98%   BMI 27.21 kg/m²     Physical Exam  Vitals and nursing note reviewed. Constitutional:       General: She is not in acute distress. Cardiovascular:      Rate and Rhythm: Normal rate and regular rhythm. Pulses: Normal pulses. Pulmonary:      Effort: Pulmonary effort is normal. No respiratory distress. Musculoskeletal:      Right lower leg: No edema. Left lower leg: No edema. Neurological:      General: No focal deficit present. Mental Status: She is alert and oriented to person, place, and time. Cranial Nerves: No cranial nerve deficit. Sensory: No sensory deficit. Motor: No weakness. Coordination: Coordination normal.      Gait: Gait normal.   Psychiatric:         Mood and Affect: Mood is anxious. Behavior: Behavior normal.         Thought Content: Thought content normal.         Judgment: Judgment normal.        On this date 01/04/2023 I have spent 50 minutes reviewing previous notes, test results and face to face with the patient discussing the diagnosis and importance of compliance with the treatment plan as well as documenting on the day of the visit.   Poly Pak DO  Family Medicine  01/04/2023

## 2023-01-04 NOTE — LETTER
NOTIFICATION TO RETURN TO WORK / SCHOOL           MsBethanie Burnett [address]  [address]  [address] [facility] [place] [address]        To Whom It May Concern:      Please excuse Betty Cabrera for an appointment in our office on 1/4/2023. Will follow up with patient within 1 week to review initial workup. If possible, Patient would benefit from as much time off as possible during this week to address symptoms. Would be OK to return to work without restriction 1/11/23.      If you have any questions, or if we may be of further assistance, do not hesitate to contact us at [phone]       Comments:     Sincerely,    Lisa Martinez DO  [address] [place] [address]

## 2023-01-06 LAB
BACTERIA SPEC CULT: NORMAL
SERVICE CMNT-IMP: NORMAL
TOPIRAMATE SERPL-MCNC: 14.9 UG/ML (ref 2–25)

## 2023-01-10 ENCOUNTER — TELEPHONE (OUTPATIENT)
Dept: FAMILY MEDICINE CLINIC | Age: 43
End: 2023-01-10

## 2023-01-10 DIAGNOSIS — G40.909 SEIZURE DISORDER (HCC): Primary | ICD-10-CM

## 2023-01-10 NOTE — PROGRESS NOTES
Sandra Palma (: 1980) is a 38 y.o. female, ESTABLISHED patient, here for FOLLOW UP:    ICD-10-CM ICD-9-CM   1. Atypical migraine  G43.009 346.80   2. Arachnoid cyst  G93.0 348.0   3. Seizure disorder ([facility] [address]  G40.909 345.90   4. Adverse effect of antiepileptic, initial encounter  T42.75XA E936.3   5. Memory loss  R41.3 780.93   6. Daytime somnolence  R40.0 780.54     Assessment   Plan     #Acute atypical migraine onset 45min before encounter  And jittery sensation prior (aura)  #Memory loss   Not common to have HA like this     Rec review CT Brain 23 Asymmetric water density fluid collection along the medial aspect of the right temporal lobe in the right ambient cistern extending towards the quadrigeminal plate cistern. Similar findings on the brain MRI from 2017, this appears to be #benign arachnoid cyst. Some asymmetric density within the right temporal lobe white matter compared to the left. Some asymmetric prominence of the temporal horn of the right lateral ventricle. Within the MEDIA tab of the patient Epic chart there is a scanned neurology report dated 2015 that references brain MRI from 2002 describing \"large congenital malformation of neuronal migration/cortical formation at the anterior two thirds of the right temporal lobe\" and 'right posterior perimesencephalic cistern arachnoid cyst'. CT findings are compatible with the outside MRI description and the appearance on the 2017 MRI. #Seizure disorder - suspect uncontrolled likely due to #nonadherence to treatment 2/2 #AE impacting performance causing #Stress at work  [email or web address] Topamax with adjustment to take 400mg after leaving work daily near the same time until can review with Neuro  Established with Neurology - Encouraged to ask if could consider alternative treatment.  Patient interested in establishing with new Neurologist, referred 2023 (reviewed need to maintain care while looking for new provider)  With new changes, ordered MRI with contrast    #Prediabetes   Reviewed diagnosis and recommendations for behavioral/dietary changes to improve. Encouraged to continue once to twice yearly monitoring of A1C. Also discussed consideration of initiating Metformin to help reduce insulin resistance. 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 DDx includes autoimmune - will monitor inflam markers ESR/CRP with annual labs or with change in sx   Established with Pain management - Previously treated with NSAID with good response. Established with Rheumotalogy - Reported she had false positive for Lupus. Record not available for review  Considered sleep med eval but never underwent due to improvement in sleep. Will restart NSAID prn to help when symptomatic. # anxious depression - significant situational component related to stressors at work   Was using buspar more when at work; stopped use     # VitD def  - unclear control  # low ferrtin - unclear control  May be contributing to  # RLS - working diagnosis  On weekly Vit D and liquid iron prn (not daily). #mild intermittent asthma - stable, denies recent exacerbation  In review of medications, reports using rescue inhaler only prn with good effect. Reviewed optimal use of rescue inhalers. Reviewed to follow up if use of rescue inhaler (albuterol) increases to twice weekly or more. Key COPD Medications                    albuterol (Proventil HFA) 90 mcg/actuation inhaler (Taking) Take 2 Puffs by inhalation every four (4) hours as needed. Orders Placed This Encounter    KETOROLAC TROMETHAMINE INJ     Order Specific Question:   Dose     Answer:   1     Order Specific Question:   Site     Answer:   RIGHT GLUTEUS     Order Specific Question:   Expiration Date     Answer:   2023     Order Specific Question:   Lot#     Answer:   [id number]     Order Specific Question:        Answer:   [employer]     Order Specific Question:   Charge Quantity? Answer:   1     Order Specific Question:   Perfomed by/Witnessed by: Answer:   Heidy Velasquez     Order Specific Question:   NDC#     Answer:   [id number] [[id number]]    SC THER/PROPH/DIAG INJECTION, SUBCUT/IM    ketorolac tromethamine (TORADOL) 60 mg/2 mL soln     Si mL by IntraMUSCular route once for 1 dose. Dispense:  1 mL     Refill:  0    DISCONTD: ondansetron (ZOFRAN) injection 4 mg    ondansetron (ZOFRAN) injection 4 mg     Return in about 2 weeks (around 2023) for 30 min follow up, needs visit to complete disability paperwork together . Subjective   Last PCP visit: 2023  See A/P      Current Outpatient Medications   Medication Instructions    topiramate (TOPAMAX) 200 mg, Oral, 2 TIMES DAILY      Objective   Visit Vitals  /67 (BP 1 Location: Right arm, BP Patient Position: Sitting, BP Cuff Size: Adult)   Pulse 72   Temp 98 °F (36.7 °C) (Temporal)   Resp 16   Ht 5' 4.5\" (1.638 m)   Wt 158 lb (71.7 kg)   LMP 2020   SpO2 100%   BMI 26.70 kg/m²     Physical Exam  Vitals and nursing note reviewed. Constitutional:       General: She is not in acute distress. Cardiovascular:      Rate and Rhythm: Normal rate and regular rhythm. Pulses: Normal pulses. Pulmonary:      Effort: Pulmonary effort is normal. No respiratory distress. Musculoskeletal:      Right lower leg: No edema. Left lower leg: No edema. Neurological:      General: No focal deficit present. Mental Status: She is alert and oriented to person, place, and time.       Cranial Nerves: No cranial nerve deficit. Sensory: No sensory deficit. Motor: No weakness. Coordination: Coordination normal.      Gait: Gait normal.   Psychiatric:         Mood and Affect: Mood is anxious. Behavior: Behavior normal.         Thought Content:  Thought content normal.         Judgment: Judgment normal.          Summer Patel,   Family Medicine  01/11/2023

## 2023-01-11 ENCOUNTER — OFFICE VISIT (OUTPATIENT)
Dept: FAMILY MEDICINE CLINIC | Age: 43
End: 2023-01-11
Payer: COMMERCIAL

## 2023-01-11 VITALS
RESPIRATION RATE: 16 BRPM | HEART RATE: 72 BPM | WEIGHT: 158 LBS | SYSTOLIC BLOOD PRESSURE: 100 MMHG | HEIGHT: 65 IN | BODY MASS INDEX: 26.33 KG/M2 | DIASTOLIC BLOOD PRESSURE: 67 MMHG | OXYGEN SATURATION: 100 % | TEMPERATURE: 98 F

## 2023-01-11 DIAGNOSIS — R41.3 MEMORY LOSS: ICD-10-CM

## 2023-01-11 DIAGNOSIS — G93.0 ARACHNOID CYST: ICD-10-CM

## 2023-01-11 DIAGNOSIS — T42.75XA ADVERSE EFFECT OF ANTIEPILEPTIC, INITIAL ENCOUNTER: ICD-10-CM

## 2023-01-11 DIAGNOSIS — G40.909 SEIZURE DISORDER (HCC): ICD-10-CM

## 2023-01-11 DIAGNOSIS — R40.0 DAYTIME SOMNOLENCE: ICD-10-CM

## 2023-01-11 DIAGNOSIS — G43.009 ATYPICAL MIGRAINE: Primary | ICD-10-CM

## 2023-01-11 PROCEDURE — 99214 OFFICE O/P EST MOD 30 MIN: CPT | Performed by: STUDENT IN AN ORGANIZED HEALTH CARE EDUCATION/TRAINING PROGRAM

## 2023-01-11 RX ORDER — ONDANSETRON 2 MG/ML
4 INJECTION INTRAMUSCULAR; INTRAVENOUS ONCE
Status: DISCONTINUED | OUTPATIENT
Start: 2023-01-11 | End: 2023-01-12

## 2023-01-11 RX ORDER — KETOROLAC TROMETHAMINE 30 MG/ML
60 INJECTION, SOLUTION INTRAMUSCULAR; INTRAVENOUS ONCE
Qty: 1 ML | Refills: 0
Start: 2023-01-11 | End: 2023-01-11

## 2023-01-11 RX ORDER — ONDANSETRON 4 MG/1
4 TABLET, ORALLY DISINTEGRATING ORAL
Qty: 21 TABLET | Refills: 0 | Status: CANCELLED | OUTPATIENT
Start: 2023-01-11

## 2023-01-11 NOTE — PROGRESS NOTES
Esperanza Miranda is a 38 y.o. female (: 1980) presenting to address:    Chief Complaint   Patient presents with    Results     Labs and CT scan review        Vitals:    23 1306   BP: 100/67   Pulse: 72   Resp: 16   Temp: 98 °F (36.7 °C)   TempSrc: Temporal   SpO2: 100%   Weight: 158 lb (71.7 kg)   Height: 5' 4.5\" (1.638 m)   PainSc:   0 - No pain   LMP: 2020       Hearing/Vision:   No results found. Learning Assessment:     Learning Assessment 2016   PRIMARY LEARNER Patient   HIGHEST LEVEL OF EDUCATION - PRIMARY LEARNER  4 YEARS OF COLLEGE   BARRIERS PRIMARY LEARNER NONE   CO-LEARNER CAREGIVER No   PRIMARY LANGUAGE ENGLISH   LEARNER PREFERENCE PRIMARY VIDEOS     LISTENING   ANSWERED BY self   RELATIONSHIP SELF     Depression Screening:     3 most recent PHQ Screens 2023   Little interest or pleasure in doing things Not at all   Feeling down, depressed, irritable, or hopeless Not at all   Total Score PHQ 2 0   Trouble falling or staying asleep, or sleeping too much -   Feeling tired or having little energy -   Poor appetite, weight loss, or overeating -   Feeling bad about yourself - or that you are a failure or have let yourself or your family down -   Trouble concentrating on things such as school, work, reading, or watching TV -   Moving or speaking so slowly that other people could have noticed; or the opposite being so fidgety that others notice -   Thoughts of being better off dead, or hurting yourself in some way -   PHQ 9 Score -   How difficult have these problems made it for you to do your work, take care of your home and get along with others -     Fall Risk Assessment:     Fall Risk Assessment, last 12 mths 3/28/2022   Able to walk? Yes   Fall in past 12 months? 0   Do you feel unsteady? 0   Are you worried about falling 0     Abuse Screening:     Abuse Screening Questionnaire 3/28/2022   Do you ever feel afraid of your partner?  N   Are you in a relationship with someone who physically or mentally threatens you? N   Is it safe for you to go home? Y     ADL Assessment:     ADL Assessment 1/22/2018   Feeding yourself No Help Needed   Getting from bed to chair No Help Needed   Getting dressed No Help Needed   Bathing or showering No Help Needed   Walk across the room (includes cane/walker) No Help Needed   Using the telphone No Help Needed   Taking your medications No Help Needed   Preparing meals No Help Needed   Managing money (expenses/bills) No Help Needed   Moderately strenuous housework (laundry) No Help Needed   Shopping for personal items (toiletries/medicines) No Help Needed   Shopping for groceries No Help Needed   Driving No Help Needed   Climbing a flight of stairs No Help Needed   Getting to places beyond walking distances No Help Needed        Coordination of Care Questionaire:   1. \"Have you been to the ER, urgent care clinic since your last visit? Hospitalized since your last visit? \" No    2. \"Have you seen or consulted any other health care providers outside of the [address] [place] [address] [place] since your last visit? \" No     3. For patients aged 39-70: Has the patient had a colonoscopy / FIT/ Cologuard? NA - based on age      If the patient is female:    4. For patients aged 41-77: Has the patient had a mammogram within the past 2 years? Yes - no Care Gap present  See top three    5. For patients aged 21-65: Has the patient had a pap smear? Yes - no Care Gap present    Advanced Directive:   1. Do you have an Advanced Directive? NO    2. Would you like information on Advanced Directives?  NO

## 2023-01-11 NOTE — Clinical Note
NOTIFICATION RETURN TO WORK / SCHOOL    1/11/2023 1:58 PM    Ms. JosephPowderly Holden Olivarez  [facility] [id number]      To Whom It May Concern:    Ximena Skinner is currently under the care of [address] [place] [address]. She will return to work/school on: ***    If there are questions or concerns please have the patient contact our office.         Sincerely,      Argenis Lopes, DO

## 2023-01-11 NOTE — LETTER
1/11/2023 2:03 PM    Ms. [address]  [address]  [address] [facility] [address]      To Whom It May Concern,    Leslye Holley was seen on 1/11/2023 and is undergoing a workup that has not yet completed to determine her ability to return to work. Please excuse until 1/25/23 to allow time to complete. Will be seen again before 1/25/23 to reevaluate. If there are questions or concerns please have the patient contact our office.         Sincerely,      Tessy Syed, DO  Family Medicine

## 2023-01-11 NOTE — Clinical Note
1/11/2023 1:58 PM    Ms. Gold Olivarez  [facility] [id number]              Sincerely,      Bahman Greer DO

## 2023-01-13 ENCOUNTER — TELEPHONE (OUTPATIENT)
Dept: FAMILY MEDICINE CLINIC | Age: 43
End: 2023-01-13

## 2023-01-13 NOTE — TELEPHONE ENCOUNTER
Pily ramirez [facility] called requesting a Edoc-tw-Dvlu due to MRI being denied by insurance. Requests to have this done by Tues Jan 17.      Leeann  [phone]  Case #[id number]

## 2023-01-13 NOTE — TELEPHONE ENCOUNTER
Pily ramirez [facility] called requesting a Mkvk-xo-Fkro due to MRI being denied by insurance. Requests to have this done by Tues Jan 17.     Leeann  [phone]  Case #[id number]

## 2023-01-25 ENCOUNTER — OFFICE VISIT (OUTPATIENT)
Dept: FAMILY MEDICINE CLINIC | Age: 43
End: 2023-01-25
Payer: COMMERCIAL

## 2023-01-25 VITALS
WEIGHT: 161 LBS | TEMPERATURE: 97 F | DIASTOLIC BLOOD PRESSURE: 73 MMHG | HEART RATE: 82 BPM | SYSTOLIC BLOOD PRESSURE: 103 MMHG | RESPIRATION RATE: 15 BRPM | HEIGHT: 65 IN | OXYGEN SATURATION: 100 % | BODY MASS INDEX: 26.82 KG/M2

## 2023-01-25 DIAGNOSIS — R41.3 MEMORY LOSS: ICD-10-CM

## 2023-01-25 DIAGNOSIS — G43.009 ATYPICAL MIGRAINE: Primary | ICD-10-CM

## 2023-01-25 DIAGNOSIS — R40.0 DAYTIME SOMNOLENCE: ICD-10-CM

## 2023-01-25 DIAGNOSIS — G93.0 ARACHNOID CYST: ICD-10-CM

## 2023-01-25 DIAGNOSIS — T42.75XD ADVERSE EFFECT OF ANTIEPILEPTIC, SUBSEQUENT ENCOUNTER: ICD-10-CM

## 2023-01-25 DIAGNOSIS — G40.909 SEIZURE DISORDER (HCC): ICD-10-CM

## 2023-01-25 PROCEDURE — 99215 OFFICE O/P EST HI 40 MIN: CPT | Performed by: STUDENT IN AN ORGANIZED HEALTH CARE EDUCATION/TRAINING PROGRAM

## 2023-01-25 NOTE — PATIENT INSTRUCTIONS
Need to ask work: If applying for short term disability, what is maximum time allowed? If returning from short term disability, what accommodations would be allowed if any? Example shorter shifts or more frequent breaks, etc    Future option:   Could we apply for Intermittent FMLA for flares of migraines? If not, when would you be able to apply? CONSTIPATION TREATMENT -- Treatment for constipation includes changing some behaviors, eating foods high in fiber, and using laxatives or enemas if needed. You can try these treatments at home, before seeing a healthcare provider. However, if you do not have a bowel movement within a few days, you should call your healthcare provider for further assistance. Behavior changes -- The bowels are most active following meals, and this is often the time when stools will pass most readily. If you ignore your body's signals to have a bowel movement, the signals become weaker and weaker over time. By paying close attention to these signals, you may have an easier time moving your bowels. Drinking a caffeine-containing beverage in the morning may also be helpful. Increase fiber -- Increasing fiber in your diet may reduce or eliminate constipation. The recommended amount of dietary fiber is 20 to 35 grams of fiber per day. By reading the product information panel on the side of the package, you can determine the number of grams of fiber per serving. Many fruits and vegetables can be particularly helpful in preventing and treating constipation. This is especially true of citrus fruits, prunes, and prune juice. Some breakfast cereals are also an excellent source of dietary fiber. Foods with a lot of fiber include prunes, apples, oranges, bananas, peas, green beans, kidney beans, cooked oatmeal, almonds, peanuts, and whole-wheat bread.     Fiber side effects -- Consuming large amounts of fiber can cause abdominal bloating or gas; this can be minimized by starting with a small amount and slowly increasing until stools become softer and more frequent. Laxatives-- If behavior changes and increasing fiber does not relieve your constipation in 2 days, you may try taking a laxative. A variety of laxatives are available for treating constipation. Hyperosmolar laxatives -- Hyperosmolar laxatives include:  ? Polyethylene glycol (MiraLAX, GlycoLax) - Polyethylene glycol is generally preferred since it does not cause gas or bloating and is available in the [place] without a prescription. Saline laxatives -- Saline laxatives such as magnesium hydroxide (Milk of Magnesia) and magnesium citrate (Evac-Q-Mag) act similarly to the hyperosmolar laxatives. Stimulant laxatives -- Stimulant laxatives include senna (eg, [facility], , Ex-Lax, Alvares's, Castoria, Senokot) and bisacodyl (eg, Correctol, Doxidan, Dulcolax). Some people overuse stimulant laxatives. Taking stimulant laxatives regularly or in large amounts can cause side effects, including low potassium levels. Thus, you should take these drugs carefully if you must use them regularly. Pills, suppositories, or enemas? -- Laxatives are available as pills that you take by mouth or as suppositories or enemas that you insert into the rectum. In general, suppositories and enemas work more quickly compared to pills, but many people do not like using them. Healthcare providers occasionally recommend prepackaged enema kits containing sodium phosphate/biphosphate (Fleet) if you have not responded to other treatments. These are not recommended if you have problems with your heart or kidneys, and should not be used more than once unless directed by your healthcare provider. Constipation treatments to avoid:  ?Emollients - Emollient laxatives, principally mineral oil, soften stools by moisturizing them. However, other treatments have fewer risks and equal benefit.   ?Natural products - A wide variety of natural products are advertised for constipation. Some of them contain the active ingredients found in commercially available laxatives. However, their dose and purity may not be carefully controlled. Thus, these products are not generally recommended. ?A variety of home-made enema preparations have been used throughout the years, such as soapsuds, hydrogen peroxide, and household detergents. These can be extremely irritating to the lining of the intestine and should be avoided.

## 2023-01-25 NOTE — PROGRESS NOTES
Matt Rodgers (: 1980) is a 38 y.o. female, ESTABLISHED patient, here for FOLLOW UP:    ICD-10-CM ICD-9-CM   1. Atypical migraine  G43.009 346.80   2. Arachnoid cyst  G93.0 348.0   3. Seizure disorder ([facility] [address]  G40.909 345.90   4. Adverse effect of antiepileptic, subsequent encounter  T42.75XD V58.89   5. Daytime somnolence  R40.0 780.54   6. Memory loss  R41.3 780.93     Assessment   Plan   Patient interested in scheduling return to work evaluation to complete paperwork  Reviewed patients interest to discusss interest to reduce impact of work environment on health   Reviewed Patient will need to request necessary paperwork from HR and discuss available options for lighter duty or accommodations with employement prior to visit and finalize a clear plan to be able to complete them. All paperwork will require a visit to discuss and fill out together. Reviewed my evaluation alone will not be sufficient to qualify for permanent accommodations/disability. See AVS for additional details regarding necessary information provided to Patient    #Acute atypical migraine - resolved  And jittery sensation prior (aura)  #Memory loss   Not common to have HA like this      Rec review CT Brain 23 Asymmetric water density fluid collection along the medial aspect of the right temporal lobe in the right ambient cistern extending towards the quadrigeminal plate cistern. Similar findings on the brain MRI from , this appears to be #benign arachnoid cyst. Some asymmetric density within the right temporal lobe white matter compared to the left. Some asymmetric prominence of the temporal horn of the right lateral ventricle.  Within the MEDIA tab of the patient Epic chart there is a scanned neurology report dated 2015 that references brain MRI from 2002 describing \"large congenital malformation of neuronal migration/cortical formation at the anterior two thirds of the right temporal lobe\" and 'right posterior perimesencephalic cistern arachnoid cyst'. CT findings are compatible with the outside MRI description and the appearance on the 2017 MRI. #Seizure disorder - suspect uncontrolled likely due to #nonadherence to treatment 2/2 #AE impacting performance causing #Stress at work  [employer] Topamax with adjustment to take 400mg after leaving work daily near the same time until can review with Neuro  Established with Neurology - Encouraged to ask if could consider alternative treatment. Patient interested in establishing with new Neurologist, referred 1/4/2023 (reviewed need to maintain care while looking for new provider)  With new changes, ordered MRI with contrast 1/4/23     #Prediabetes   Reviewed diagnosis and recommendations for behavioral/dietary changes to improve. Encouraged to continue once to twice yearly monitoring of A1C. Also discussed consideration of initiating Metformin to help reduce insulin resistance. 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 DDx includes autoimmune - will monitor inflam markers ESR/CRP with annual labs or with change in sx   Established with Pain management - Previously treated with NSAID with good response. Established with Rheumotalogy - Reported she had false positive for Lupus. Record not available for review  Considered sleep med eval but never underwent due to improvement in sleep. Will restart NSAID prn to help when symptomatic.       # anxious depression - significant situational component related to stressors at work   Was using buspar more when at work; stopped use     # VitD def  - improved  # low ferrtin   May be contributing to  # RLS - working diagnosis  On weekly Vit D and liquid iron prn (not daily). Lab Results   Component Value Date/Time    Vitamin D 25-Hydroxy 98.7 01/04/2023 01:54 PM       Lab Results   Component Value Date/Time    Ferritin 34 01/04/2023 01:54 PM     #mild intermittent asthma - stable, denies recent exacerbation  In review of medications, reports using rescue inhaler only prn with good effect. Reviewed optimal use of rescue inhalers. Reviewed to follow up if use of rescue inhaler (albuterol) increases to twice weekly or more. Key COPD Medications                    albuterol (Proventil HFA) 90 mcg/actuation inhaler (Taking) Take 2 Puffs by inhalation every four (4) hours as needed. No orders of the defined types were placed in this encounter. Return in about 1 month (around 2/25/2023) for 45 min follow up (will do paperwork only if has at time of visit and clear plan) , Virtual OK. Subjective   Last PCP visit: 1/11/2023  See A/P     Current Outpatient Medications   Medication Instructions    topiramate (TOPAMAX) 200 mg, Oral, 2 TIMES DAILY      Objective   Visit Vitals  /73 (BP 1 Location: Right arm, BP Patient Position: Sitting)   Pulse 82   Temp 97 °F (36.1 °C) (Temporal)   Resp 15   Ht 5' 4.5\" (1.638 m)   Wt 161 lb (73 kg)   LMP 01/24/2020   SpO2 100%   BMI 27.21 kg/m²     Physical Exam  Vitals and nursing note reviewed. Constitutional:       General: She is not in acute distress. Cardiovascular:      Rate and Rhythm: Normal rate and regular rhythm. Pulses: Normal pulses. Pulmonary:      Effort: Pulmonary effort is normal. No respiratory distress. Musculoskeletal:      Right lower leg: No edema. Left lower leg: No edema. Neurological:      General: No focal deficit present. Mental Status: She is alert and oriented to person, place, and time. Cranial Nerves:  No cranial nerve deficit. Sensory: No sensory deficit. Motor: No weakness. Coordination: Coordination normal.      Gait: Gait normal.   Psychiatric:         Mood and Affect: Mood is anxious. Behavior: Behavior normal.         Thought Content: Thought content normal.         Judgment: Judgment normal.        On this date 01/25/2023 I have spent 45 minutes reviewing previous notes, test results and face to face with the patient discussing the diagnosis and importance of compliance with the treatment plan as well as documenting on the day of the visit.   Duke Anderson,   Family Medicine  01/25/2023

## 2023-01-25 NOTE — PROGRESS NOTES
Caren Kuhn is a 38 y.o. female (: 1980)  Pt is  fasting. Pt is in Room # 3 presenting to address:    No chief complaint on file. There were no vitals filed for this visit. Hearing/Vision:   No results found. Learning Assessment:     Learning Assessment 2016   PRIMARY LEARNER Patient   HIGHEST LEVEL OF EDUCATION - PRIMARY LEARNER  4 YEARS OF COLLEGE   BARRIERS PRIMARY LEARNER NONE   CO-LEARNER CAREGIVER No   PRIMARY LANGUAGE ENGLISH   LEARNER PREFERENCE PRIMARY VIDEOS     LISTENING   ANSWERED BY self   RELATIONSHIP SELF     Depression Screening:     3 most recent PHQ Screens 2023   Little interest or pleasure in doing things Not at all   Feeling down, depressed, irritable, or hopeless Not at all   Total Score PHQ 2 0   Trouble falling or staying asleep, or sleeping too much -   Feeling tired or having little energy -   Poor appetite, weight loss, or overeating -   Feeling bad about yourself - or that you are a failure or have let yourself or your family down -   Trouble concentrating on things such as school, work, reading, or watching TV -   Moving or speaking so slowly that other people could have noticed; or the opposite being so fidgety that others notice -   Thoughts of being better off dead, or hurting yourself in some way -   PHQ 9 Score -   How difficult have these problems made it for you to do your work, take care of your home and get along with others -     Fall Risk Assessment:     Fall Risk Assessment, last 12 mths 3/28/2022   Able to walk? Yes   Fall in past 12 months? 0   Do you feel unsteady? 0   Are you worried about falling 0     Abuse Screening:     Abuse Screening Questionnaire 3/28/2022   Do you ever feel afraid of your partner? N   Are you in a relationship with someone who physically or mentally threatens you? N   Is it safe for you to go home?  Y     ADL Assessment:     ADL Assessment 2018   Feeding yourself No Help Needed   Getting from bed to chair No Help Needed   Getting dressed No Help Needed   Bathing or showering No Help Needed   Walk across the room (includes cane/walker) No Help Needed   Using the telphone No Help Needed   Taking your medications No Help Needed   Preparing meals No Help Needed   Managing money (expenses/bills) No Help Needed   Moderately strenuous housework (laundry) No Help Needed   Shopping for personal items (toiletries/medicines) No Help Needed   Shopping for groceries No Help Needed   Driving No Help Needed   Climbing a flight of stairs No Help Needed   Getting to places beyond walking distances No Help Needed        Coordination of Care Questionaire:   1. \"Have you been to the ER, urgent care clinic since your last visit? Hospitalized since your last visit? \" No    2. \"Have you seen or consulted any other health care providers outside of the [address] [place] [address] [place] since your last visit? \" No     3. For patients aged 39-70: Has the patient had a colonoscopy / FIT/ Cologuard? NA - based on age      If the patient is female:    4. For patients aged 41-77: Has the patient had a mammogram within the past 2 years? Yes - no Care Gap present  See top three    5. For patients aged 21-65: Has the patient had a pap smear? Yes - no Care Gap present    Advanced Directive:   1. Do you have an Advanced Directive? NO    2. Would you like information on Advanced Directives?  NO

## 2023-01-31 PROCEDURE — 96372 THER/PROPH/DIAG INJ SC/IM: CPT | Performed by: STUDENT IN AN ORGANIZED HEALTH CARE EDUCATION/TRAINING PROGRAM

## 2023-01-31 RX ORDER — ONDANSETRON 2 MG/ML
4 INJECTION INTRAMUSCULAR; INTRAVENOUS ONCE
Status: COMPLETED | OUTPATIENT
Start: 2023-01-31 | End: 2023-01-31

## 2023-01-31 RX ADMIN — ONDANSETRON 4 MG: 2 INJECTION INTRAMUSCULAR; INTRAVENOUS at 10:46

## 2023-02-27 ENCOUNTER — OFFICE VISIT (OUTPATIENT)
Dept: FAMILY MEDICINE CLINIC | Facility: CLINIC | Age: 43
End: 2023-02-27
Payer: COMMERCIAL

## 2023-02-27 VITALS
HEART RATE: 78 BPM | RESPIRATION RATE: 17 BRPM | BODY MASS INDEX: 26.16 KG/M2 | DIASTOLIC BLOOD PRESSURE: 64 MMHG | TEMPERATURE: 98.1 F | OXYGEN SATURATION: 98 % | HEIGHT: 65 IN | SYSTOLIC BLOOD PRESSURE: 94 MMHG | WEIGHT: 157 LBS

## 2023-02-27 DIAGNOSIS — G40.909 SEIZURE DISORDER (HCC): ICD-10-CM

## 2023-02-27 DIAGNOSIS — G40.909 NONINTRACTABLE EPILEPSY WITHOUT STATUS EPILEPTICUS, UNSPECIFIED EPILEPSY TYPE (HCC): Primary | ICD-10-CM

## 2023-02-27 DIAGNOSIS — Z56.6 OTHER PHYSICAL AND MENTAL STRAIN RELATED TO WORK: ICD-10-CM

## 2023-02-27 DIAGNOSIS — T42.75XD ADVERSE EFFECT OF ANTIEPILEPTIC, SUBSEQUENT ENCOUNTER: ICD-10-CM

## 2023-02-27 DIAGNOSIS — Z76.89 RETURN TO WORK EVALUATION: ICD-10-CM

## 2023-02-27 DIAGNOSIS — R40.0 SOMNOLENCE: ICD-10-CM

## 2023-02-27 PROCEDURE — 99214 OFFICE O/P EST MOD 30 MIN: CPT | Performed by: STUDENT IN AN ORGANIZED HEALTH CARE EDUCATION/TRAINING PROGRAM

## 2023-02-27 RX ORDER — TOPIRAMATE 100 MG/1
200 TABLET, FILM COATED ORAL DAILY
Qty: 180 TABLET | Refills: 1 | Status: SHIPPED | OUTPATIENT
Start: 2023-02-27

## 2023-02-27 RX ORDER — CYCLOBENZAPRINE HCL 5 MG
TABLET ORAL
Qty: 30 TABLET | Refills: 0 | Status: SHIPPED | OUTPATIENT
Start: 2023-02-27

## 2023-02-27 SDOH — HEALTH STABILITY - MENTAL HEALTH: OTHER PHYSICAL AND MENTAL STRAIN RELATED TO WORK: Z56.6

## 2023-02-27 NOTE — PROGRESS NOTES
Mckenna Sheldon is a 42 y.o. female (: 1980) presenting to address:    Chief Complaint   Patient presents with    Forms     Form completion        Vitals:    23 0732   BP: 94/64   Pulse: 78   Resp: 17   Temp: 98.1 °F (36.7 °C)   SpO2: 98%       Coordination of Care Questionaire:   1. \"Have you been to the ER, urgent care clinic since your last visit?  Hospitalized since your last visit?\" No    2. \"Have you seen or consulted any other health care providers outside of the [facility] since your last visit?\" No     3. For patients aged 45-75: Has the patient had a colonoscopy / FIT/ Cologuard? NA - based on age      If the patient is female:    4. For patients aged 40-74: Has the patient had a mammogram within the past 2 years? Yes - no Care Gap present      5. For patients aged 21-65: Has the patient had a pap smear? Yes - no Care Gap present    Advanced Directive:   1. Do you have an Advanced Directive? No    2. Would you like information on Advanced Directives? No

## 2023-02-27 NOTE — LETTER
2/27/2023        Ananya Bonilla  [address]  [address] [facility] [address]      To whom it may concern: Ananya Chad was seen in office 2/27/2023.        Sincerely,      Karina Sylvester DO

## 2023-02-27 NOTE — PATIENT INSTRUCTIONS
Weight loss Medicines:  Medication may be helpful for weight loss when used in combination with diet, exercise, and lifestyle changes. However, it is important to understand the risks, benefits, and limitations of these medicines. They can cause side effects that may be bothersome, and in many cases the long-term safety data are limited. In addition, these medicines may not be covered by insurance and can be expensive. Although weight loss medicines may not help you reach your \"dream\" weight, they can contribute to reducing your risk of diabetes or heart disease. Weight loss medicines may be recommended for people who have not been able to lose weight with diet and exercise who have a:  ?Body mass index (BMI) of 30 or more  ? BMI between 27 and 29.9 and have other medical problems, such as diabetes, high cholesterol, or high blood pressure    Treatment options:   Topamax (Promotes fullness and curbs cravings)   Metformin (To help with insulin resistance, especially in patients with PCOS  and or pre diabetes)  Wellbutrin (Helps with appetite suppression and stress eating)  Plenity - Safe to use for long term in patients without history of bariatric surgery. $98 first month, $249 three month supply after that.    Newest medications approved by the FDA: Qsymia (Phentermine/Topiramate), Belviq (Lorcaserin), Contrave (Bupropion and naltrexone), **Wegovy (Semaglutide), and Saxenda (Liraglutide.)**    Weight loss guidance:   Here is a basic handout to help you start losing weight: http://www.sherry.paola/  Additional strategies for successful weight loss: NameHandles.gl  This website tells you how to find out what a healthy weight is for you: http://[employer]/  This website helps you customize an eating plan to your height, weight, and weight loss goal: .[employer] Dom[employer].com.pt     What is CGRP? CGRP stands for calcitonin gene-related peptide, and it is a protein that is released around the brain. When CGRP is released, it causes intense inflammation in the coverings of the brain (the meninges), and for most migraine patients, causes the pain of a migraine attack. In fact, if you give CGRP by an intravenous method to a person with migraine, within four hours, most of them will get a migraine. Thats the basis of all the new treatments. What kind of side effects come with these treatments? These drugs are extremely well tolerated. In fact, for most people, they dont seem to have significant side effects other than some pain at the injection site with injectables. Who will benefit from these treatments? The monoclonal antibodies have all been studied in episodic migraine and in chronic migraine and work in both, and theyre approved by the FDA for both, which is significant. These treatments are effective for migraine with aura or migraine without aura, for medication overuse and for no medication overuse. They have been proven to work in patients who have had a lack of success in two, three, and four previous preventive medications.     There are five CGRP inhibitors:  Atogepant  Erenumab  Eptinezumab  Germán System

## 2023-02-27 NOTE — PROGRESS NOTES
Yolanda Gutierrez (: 1980) is a 38 y.o. female, ESTABLISHED patient, here for FOLLOW UP:    ICD-10-CM    1. Nonintractable epilepsy without status epilepticus, unspecified epilepsy type ([facility] [address]  G40.909 cyclobenzaprine (FLEXERIL) 5 MG tablet     topiramate (TOPAMAX) 100 MG tablet      2. Return to work evaluation  Z76.89       3. Seizure disorder ([facility] [address]  G40.909       4. Adverse effect of antiepileptic, subsequent encounter  T42.75XD       5. Other physical and mental strain related to work  Z56.6       6. Somnolence  R40.0       7. BMI 26.0-26.9,adult  Z68.26         Assessment   Plan     #Return to work evaluation  Patient reports she was terminated from work stating that she had only needed a letter indicating she had an upcoming appointment scheduled with me to maintain her position. I explained unfortunately this had not been communicated to me and that Patient has access to proof of scheduled appointments through [address] On chart review it is not clear if this specific request was previously expressed to staff. Return to work letter and form would not have been able to be completed without direct discussion with Patient to determine her specific plan for leave and her plan to return to work. Paperwork received for Patient was for disability AND FMLA which was not previously discussed. Patient aware all forms and letters regarding medical leave will require a visit to discuss and fill out together. #Acute atypical migraine - resolved  And jittery sensation prior (aura)  #Memory loss   Not common to have HA like this   Reviewed consideration for CGRP inhibitors (depending on coverage) which have a better safety/tolerability profile than prior migraine treatments      Rec review CT Brain 23 Asymmetric water density fluid collection along the medial aspect of the right temporal lobe in the right ambient cistern extending towards the quadrigeminal plate cistern.  Similar findings on the brain MRI from 2017, this appears to be #benign arachnoid cyst. Some asymmetric density within the right temporal lobe white matter compared to the left. Some asymmetric prominence of the temporal horn of the right lateral ventricle. Within the MEDIA tab of the patient Epic chart there is a scanned neurology report dated 5/4/2015 that references brain MRI from 8/8/2002 describing \"large congenital malformation of neuronal migration/cortical formation at the anterior two thirds of the right temporal lobe\" and 'right posterior perimesencephalic cistern arachnoid cyst'. CT findings are compatible with the outside MRI description and the appearance on the 2017 MRI. #Seizure disorder - suspect uncontrolled likely due to #nonadherence to treatment 2/2 #AE impacting performance causing #Stress at work - no longer employed   Patient has reduced dose of Topamax to 200mg once daily. Reports improved tolerance  Established with new Neurologist - Rec review visit with discussion to try switching to Lyrica and to use Diazepam nasal as rescue agent. Patient reports she was unable to afford Diazepam and was fearful to start Lyrica out of concern for AE. Reviewed all antiepileptics will have potential for AE and need to consider risk/benefit of nonadherence to medications. Reviewed need to maintain AED monitoring with Neurology as well. With new changes, ordered MRI with contrast 1/4/23- Patient reports insurance denied. Reviewed coverage may be better if Neurology agrees with need for updated MRI. #Prediabetes   Reviewed diagnosis and recommendations for behavioral/dietary changes to improve. Encouraged to continue once to twice yearly monitoring of A1C. Also discussed consideration of initiating Metformin to help reduce insulin resistance.                 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 DDx includes autoimmune - will monitor inflam markers ESR/CRP with annual labs or with change in sx   Established with Pain management - Previously treated with NSAID with good response. Established with Rheumotalogy - Reported she had false positive for Lupus. Record not available for review  Considered sleep med eval but never underwent due to improvement in sleep. Will try alternative muscle relaxer PRN     # anxious depression - significant situational component related to stressors due to work situation   Was using buspar more when at work; stopped use     #Body mass index is 26.53 kg/m². Counseled on diet and exercise methods. Positive reinforcement provided. Handout regarding [employer] Weight loss program provided and encouraged to follow up if additional questions or interested in considering medications. Wt Readings from Last 3 Encounters:   02/27/23 157 lb (71.2 kg)   01/25/23 161 lb (73 kg)   01/11/23 158 lb (71.7 kg)     # VitD def  - improved  # low ferrtin   May be contributing to  # RLS - working diagnosis  On weekly Vit D and liquid iron prn (not daily). Lab Results   Component Value Date/Time     Vitamin D 25-Hydroxy 98.7 01/04/2023 01:54 PM                       Lab Results   Component Value Date/Time     Ferritin 34 01/04/2023 01:54 PM      #mild intermittent asthma - stable, denies recent exacerbation  In review of medications, reports using rescue inhaler only prn with good effect. Reviewed optimal use of rescue inhalers. Reviewed to follow up if use of rescue inhaler (albuterol) increases to twice weekly or more.       Key COPD Medications                    albuterol (Proventil HFA) 90 mcg/actuation inhaler (Taking) Take 2 Puffs by inhalation every four (4) hours as needed. No orders of the defined types were placed in this encounter. Return in about 3 months (around 5/27/2023) for 30min, follow up with PCP. Subjective   See A/P     Current Outpatient Medications   Medication Instructions    albuterol sulfate HFA (PROVENTIL;VENTOLIN;PROAIR) 108 (90 Base) MCG/ACT inhaler 2 puffs, Inhalation, EVERY 4 HOURS PRN    cyclobenzaprine (FLEXERIL) 5 MG tablet Take 1-2 tablets up to 2 times daily as needed for muscle spasm. If groggy, limit use to nightly only or try 1/2 tablet.    ergocalciferol (ERGOCALCIFEROL) 50,000 Units, Oral, EVERY 7 DAYS    topiramate (TOPAMAX) 200 mg, Oral, DAILY      Objective   BP 94/64 (Site: Left Upper Arm, Position: Sitting, Cuff Size: Medium Adult)   Pulse 78   Temp 98.1 °F (36.7 °C) (Temporal)   Resp 17   Ht 5' 4.5\" (1.638 m)   Wt 157 lb (71.2 kg)   SpO2 98%   BMI 26.53 kg/m²   Physical Exam  Vitals and nursing note reviewed. Constitutional:       General: She is not in acute distress. Cardiovascular:      Rate and Rhythm: Normal rate. Pulmonary:      Effort: Pulmonary effort is normal. No respiratory distress. Neurological:      Mental Status: She is alert and oriented to person, place, and time. Gait: Gait normal.   Psychiatric:         Attention and Perception: Attention normal.         Mood and Affect: Mood normal. Affect is angry (mainly directed at recent stressors with work/health).          Speech: Speech normal.         Behavior: Behavior normal.         Cognition and Memory: Cognition normal.          Chary Burgess,   Family Medicine  02/27/2023

## 2023-03-13 ENCOUNTER — CLINICAL DOCUMENTATION (OUTPATIENT)
Dept: FAMILY MEDICINE CLINIC | Facility: CLINIC | Age: 43
End: 2023-03-13

## 2023-03-13 NOTE — PROGRESS NOTES
Patient referred to NEUROLOGY 01/04/2023:    General 02/09/2023  9:52 AM Lynn Boland - -   Note    Referral, OV Notes, and Insurance Information sent to:     [facility]  [address]  [facility], [address] [place] [address]  Tel: [phone]  Fax: [phone]              Request for referral status faxed to provider listed above.

## 2023-03-21 RX ORDER — ERGOCALCIFEROL 1.25 MG/1
CAPSULE ORAL
Qty: 12 CAPSULE | Refills: 3 | Status: SHIPPED | OUTPATIENT
Start: 2023-03-21 | End: 2023-05-04 | Stop reason: SDUPTHER

## 2023-03-22 ENCOUNTER — CLINICAL DOCUMENTATION (OUTPATIENT)
Dept: FAMILY MEDICINE CLINIC | Facility: CLINIC | Age: 43
End: 2023-03-22

## 2023-03-22 NOTE — PROGRESS NOTES
Patient was referred to [facility]:    General 02/09/2023  9:54 AM Lynn Boland - -   Note    Referral, OV Notes, and Insurance Information sent to:     [facility]   [address] [place] [address]  Tel: [phone]  Fax: [phone]

## 2023-05-01 NOTE — PROGRESS NOTES
RLS - working diagnosis  On weekly Vit D and liquid iron prn (not daily). Lab Results   Component Value Date/Time     Vitamin D 25-Hydroxy 98.7 01/04/2023 01:54 PM                       Lab Results   Component Value Date/Time     Ferritin 34 01/04/2023 01:54 PM      #mild intermittent asthma - stable, denies recent exacerbation  In review of medications, reports using rescue inhaler only prn with good effect. Reviewed optimal use of rescue inhalers. Reviewed to follow up if use of rescue inhaler (albuterol) increases to twice weekly or more. Key COPD Medications                    albuterol (Proventil HFA) 90 mcg/actuation inhaler (Taking) Take 2 Puffs by inhalation every four (4) hours as needed. No orders of the defined types were placed in this encounter. Return in about 6 weeks (around 6/15/2023) for 30min, follow up with PCP. Subjective   See A/P     Current Outpatient Medications   Medication Instructions    albuterol sulfate HFA (PROVENTIL;VENTOLIN;PROAIR) 108 (90 Base) MCG/ACT inhaler 2 puffs, Inhalation, EVERY 4 HOURS PRN    cyclobenzaprine (FLEXERIL) 5 MG tablet Take 1-2 tablets up to 2 times daily as needed for muscle spasm. If groggy, limit use to nightly only or try 1/2 tablet.     topiramate (TOPAMAX) 100 mg, Oral, DAILY    vitamin D (ERGOCALCIFEROL) 1.25 MG (33030 UT) CAPS capsule TAKE 1 CAPSULE BY MOUTH EVERY 7 DAYS FOR LOW VITAMIND LEVELS      Allergies   Allergen Reactions    Iodine      Other reaction(s): Unknown (comments)  Other reaction(s): anaphylaxis/angioedema  Face throat, swelled after CT in 2001, had MRI w/wo Contrast in 2016 with no problem    Sulfa Antibiotics Swelling    Codeine Hives    Iodinated Contrast Media Itching and Swelling    Pecan Extract Allergy Skin Test Other (See Comments)     Throat and ears itch    Prednisone Other (See Comments)    Pseudoephedrine Hcl Other (See Comments)     Has seizure when combined w/ antibotic    Shellfish

## 2023-05-04 ENCOUNTER — TELEMEDICINE (OUTPATIENT)
Dept: FAMILY MEDICINE CLINIC | Facility: CLINIC | Age: 43
End: 2023-05-04

## 2023-05-04 DIAGNOSIS — G40.909 SEIZURE DISORDER (HCC): ICD-10-CM

## 2023-05-04 DIAGNOSIS — R40.0 SOMNOLENCE: ICD-10-CM

## 2023-05-04 DIAGNOSIS — E55.9 VITAMIN D DEFICIENCY, UNSPECIFIED: Primary | ICD-10-CM

## 2023-05-04 DIAGNOSIS — Z56.6 OTHER PHYSICAL AND MENTAL STRAIN RELATED TO WORK: ICD-10-CM

## 2023-05-04 DIAGNOSIS — Z02.89 ENCOUNTER FOR COMPLETION OF FORM WITH PATIENT: Primary | ICD-10-CM

## 2023-05-04 DIAGNOSIS — T42.75XD ADVERSE EFFECT OF ANTIEPILEPTIC, SUBSEQUENT ENCOUNTER: ICD-10-CM

## 2023-05-04 DIAGNOSIS — Z76.89 RETURN TO WORK EVALUATION: ICD-10-CM

## 2023-05-04 RX ORDER — TOPIRAMATE 50 MG/1
100 TABLET, FILM COATED ORAL DAILY
Qty: 180 TABLET | Refills: 0 | Status: SHIPPED | OUTPATIENT
Start: 2023-05-04

## 2023-05-04 RX ORDER — ERGOCALCIFEROL 1.25 MG/1
CAPSULE ORAL
Qty: 12 CAPSULE | Refills: 3 | Status: SHIPPED | OUTPATIENT
Start: 2023-05-04

## 2023-05-04 SDOH — ECONOMIC STABILITY: TRANSPORTATION INSECURITY
IN THE PAST 12 MONTHS, HAS LACK OF TRANSPORTATION KEPT YOU FROM MEETINGS, WORK, OR FROM GETTING THINGS NEEDED FOR DAILY LIVING?: YES

## 2023-05-04 SDOH — ECONOMIC STABILITY: HOUSING INSECURITY
IN THE LAST 12 MONTHS, WAS THERE A TIME WHEN YOU DID NOT HAVE A STEADY PLACE TO SLEEP OR SLEPT IN A SHELTER (INCLUDING NOW)?: NO

## 2023-05-04 SDOH — ECONOMIC STABILITY: INCOME INSECURITY: HOW HARD IS IT FOR YOU TO PAY FOR THE VERY BASICS LIKE FOOD, HOUSING, MEDICAL CARE, AND HEATING?: VERY HARD

## 2023-05-04 SDOH — ECONOMIC STABILITY: FOOD INSECURITY: WITHIN THE PAST 12 MONTHS, YOU WORRIED THAT YOUR FOOD WOULD RUN OUT BEFORE YOU GOT MONEY TO BUY MORE.: OFTEN TRUE

## 2023-05-04 SDOH — HEALTH STABILITY - MENTAL HEALTH: OTHER PHYSICAL AND MENTAL STRAIN RELATED TO WORK: Z56.6

## 2023-05-04 SDOH — ECONOMIC STABILITY: FOOD INSECURITY: WITHIN THE PAST 12 MONTHS, THE FOOD YOU BOUGHT JUST DIDN'T LAST AND YOU DIDN'T HAVE MONEY TO GET MORE.: OFTEN TRUE

## 2023-07-25 NOTE — LETTER
1/25/2023 11:35 AM    Ms. Italia Ospina Orion Juan  [address]  [address] [facility] [address]    Need to ask work:  · If applying for short term disability, what is maximum time allowed? · If returning from short term disability, what accommodations would be allowed if any? Example shorter shifts or more frequent breaks, etc    Future option:   · After returning to work, could we apply for Intermittent FMLA for flares of migraines?              Sincerely,      Roney Goodwin, DO
1/25/2023 11:38 AM    Ms. RossLuz Mark Olivarez  [facility] [id number]      Need to ask work: If applying for short term disability, what is maximum time allowed? If returning from short term disability, what accommodations would be allowed if any? Example shorter shifts or more frequent breaks, etc    Future option:   Could we apply for Intermittent FMLA for flares of migraines? If not, when would you be able to apply?       Sincerely,      Sara Love, DO
3 = A little assistance

## 2023-08-07 DIAGNOSIS — G40.909 SEIZURE DISORDER (HCC): ICD-10-CM

## 2023-08-07 RX ORDER — TOPIRAMATE 50 MG/1
TABLET, FILM COATED ORAL
Qty: 180 TABLET | Refills: 2 | Status: SHIPPED | OUTPATIENT
Start: 2023-08-07 | End: 2023-08-07 | Stop reason: DRUGHIGH

## 2023-08-07 RX ORDER — TOPIRAMATE 100 MG/1
100 TABLET, FILM COATED ORAL 2 TIMES DAILY
Qty: 180 TABLET | Refills: 3 | Status: SHIPPED | OUTPATIENT
Start: 2023-08-07

## 2023-08-07 NOTE — TELEPHONE ENCOUNTER
Med refill request  Last visit: 5/4/23  Next visit: No future appointments.   Last filled: 5/4/23; qty 180 w/no refills

## 2023-08-07 NOTE — TELEPHONE ENCOUNTER
[facility] pharmacy called on behalf of the patient. They had refills on file for 1 year, however there was a glitch in the system and It was completely canceled. Patient is completely out of medication. Spoke w/ pt to clarify which pharmacy she is using because a Prescription was sent 5/4/23 to [facility] for 50 mg tablet twice a day. Patient stated that she had to increase the dosage back to 200 mg daily because she had a seizure so she would need a new Rx order.